# Patient Record
Sex: FEMALE | Race: WHITE | NOT HISPANIC OR LATINO | Employment: FULL TIME | URBAN - METROPOLITAN AREA
[De-identification: names, ages, dates, MRNs, and addresses within clinical notes are randomized per-mention and may not be internally consistent; named-entity substitution may affect disease eponyms.]

---

## 2018-10-26 ENCOUNTER — OFFICE VISIT CONVERTED (OUTPATIENT)
Dept: FAMILY MEDICINE CLINIC | Facility: CLINIC | Age: 23
End: 2018-10-26
Attending: NURSE PRACTITIONER

## 2019-01-09 ENCOUNTER — OFFICE VISIT CONVERTED (OUTPATIENT)
Dept: FAMILY MEDICINE CLINIC | Facility: CLINIC | Age: 24
End: 2019-01-09
Attending: NURSE PRACTITIONER

## 2019-02-04 ENCOUNTER — HOSPITAL ENCOUNTER (OUTPATIENT)
Dept: URGENT CARE | Facility: CLINIC | Age: 24
Discharge: HOME | End: 2019-02-04

## 2019-03-08 ENCOUNTER — OFFICE VISIT CONVERTED (OUTPATIENT)
Dept: FAMILY MEDICINE CLINIC | Facility: CLINIC | Age: 24
End: 2019-03-08
Attending: NURSE PRACTITIONER

## 2019-03-11 ENCOUNTER — HOSPITAL ENCOUNTER (OUTPATIENT)
Dept: FAMILY MEDICINE CLINIC | Facility: CLINIC | Age: 24
Discharge: HOME | End: 2019-03-11
Attending: NURSE PRACTITIONER

## 2019-03-11 ENCOUNTER — HOSPITAL ENCOUNTER (OUTPATIENT)
Dept: OTHER | Facility: HOSPITAL | Age: 24
Discharge: HOME | End: 2019-03-11
Attending: OBSTETRICS & GYNECOLOGY

## 2019-03-11 LAB
ALBUMIN SERPL-MCNC: 4.2 G/DL (ref 3.5–5)
ALBUMIN/GLOB SERPL: 1.3 {RATIO} (ref 1.4–2.6)
ALP SERPL-CCNC: 93 U/L (ref 42–98)
ALT SERPL-CCNC: 75 U/L (ref 10–40)
ANION GAP SERPL CALC-SCNC: 17 MMOL/L (ref 8–19)
AST SERPL-CCNC: 71 U/L (ref 15–50)
BASOPHILS # BLD AUTO: 0.05 10*3/UL (ref 0–0.2)
BASOPHILS NFR BLD AUTO: 0.8 % (ref 0–3)
BILIRUB SERPL-MCNC: 0.35 MG/DL (ref 0.2–1.3)
BUN SERPL-MCNC: 22 MG/DL (ref 5–25)
BUN/CREAT SERPL: 22 {RATIO} (ref 6–20)
CALCIUM SERPL-MCNC: 9.2 MG/DL (ref 8.7–10.4)
CHLORIDE SERPL-SCNC: 105 MMOL/L (ref 99–111)
CONV ABS IMM GRAN: 0.04 10*3/UL (ref 0–0.2)
CONV CO2: 22 MMOL/L (ref 22–32)
CONV IMMATURE GRAN: 0.6 % (ref 0–1.8)
CONV TOTAL PROTEIN: 7.5 G/DL (ref 6.3–8.2)
CREAT UR-MCNC: 0.98 MG/DL (ref 0.5–0.9)
DEPRECATED RDW RBC AUTO: 48.4 FL (ref 36.4–46.3)
EOSINOPHIL # BLD AUTO: 0.15 10*3/UL (ref 0–0.7)
EOSINOPHIL # BLD AUTO: 2.4 % (ref 0–7)
ERYTHROCYTE [DISTWIDTH] IN BLOOD BY AUTOMATED COUNT: 14.7 % (ref 11.7–14.4)
GFR SERPLBLD BASED ON 1.73 SQ M-ARVRAT: >60 ML/MIN/{1.73_M2}
GLOBULIN UR ELPH-MCNC: 3.3 G/DL (ref 2–3.5)
GLUCOSE SERPL-MCNC: 103 MG/DL (ref 65–99)
HBA1C MFR BLD: 13.9 G/DL (ref 12–16)
HCT VFR BLD AUTO: 45.1 % (ref 37–47)
LYMPHOCYTES # BLD AUTO: 1.3 10*3/UL (ref 1–5)
MCH RBC QN AUTO: 27.6 PG (ref 27–31)
MCHC RBC AUTO-ENTMCNC: 30.8 G/DL (ref 33–37)
MCV RBC AUTO: 89.5 FL (ref 81–99)
MONOCYTES # BLD AUTO: 0.67 10*3/UL (ref 0.2–1.2)
MONOCYTES NFR BLD AUTO: 10.7 % (ref 3–10)
NEUTROPHILS # BLD AUTO: 4.03 10*3/UL (ref 2–8)
NEUTROPHILS NFR BLD AUTO: 64.7 % (ref 30–85)
NRBC CBCN: 0 % (ref 0–0.7)
OSMOLALITY SERPL CALC.SUM OF ELEC: 292 MOSM/KG (ref 273–304)
PLATELET # BLD AUTO: 285 10*3/UL (ref 130–400)
PMV BLD AUTO: 10.2 FL (ref 9.4–12.3)
POTASSIUM SERPL-SCNC: 4.6 MMOL/L (ref 3.5–5.3)
RBC # BLD AUTO: 5.04 10*6/UL (ref 4.2–5.4)
SODIUM SERPL-SCNC: 139 MMOL/L (ref 135–147)
T4 FREE SERPL-MCNC: 1 NG/DL (ref 0.9–1.8)
TSH SERPL-ACNC: 1.59 M[IU]/L (ref 0.27–4.2)
VARIANT LYMPHS NFR BLD MANUAL: 20.8 % (ref 20–45)
VIT B12 SERPL-MCNC: 726 PG/ML (ref 211–911)
WBC # BLD AUTO: 6.24 10*3/UL (ref 4.8–10.8)

## 2019-03-14 LAB
CONV LAST MENSTURAL PERIOD: NORMAL
SPECIMEN SOURCE: NORMAL
SPECIMEN SOURCE: NORMAL
THIN PREP CVX: NORMAL

## 2019-06-20 ENCOUNTER — OFFICE VISIT CONVERTED (OUTPATIENT)
Dept: FAMILY MEDICINE CLINIC | Facility: CLINIC | Age: 24
End: 2019-06-20
Attending: NURSE PRACTITIONER

## 2019-06-20 ENCOUNTER — HOSPITAL ENCOUNTER (OUTPATIENT)
Dept: FAMILY MEDICINE CLINIC | Facility: CLINIC | Age: 24
Discharge: HOME | End: 2019-06-20
Attending: NURSE PRACTITIONER

## 2019-06-20 LAB
ALBUMIN SERPL-MCNC: 4 G/DL (ref 3.5–5)
ALBUMIN/GLOB SERPL: 1.3 {RATIO} (ref 1.4–2.6)
ALP SERPL-CCNC: 100 U/L (ref 42–98)
ALT SERPL-CCNC: 39 U/L (ref 10–40)
ANION GAP SERPL CALC-SCNC: 17 MMOL/L (ref 8–19)
AST SERPL-CCNC: 29 U/L (ref 15–50)
BASOPHILS # BLD AUTO: 0.05 10*3/UL (ref 0–0.2)
BASOPHILS NFR BLD AUTO: 1 % (ref 0–3)
BILIRUB SERPL-MCNC: 0.37 MG/DL (ref 0.2–1.3)
BUN SERPL-MCNC: 14 MG/DL (ref 5–25)
BUN/CREAT SERPL: 17 {RATIO} (ref 6–20)
CALCIUM SERPL-MCNC: 9.2 MG/DL (ref 8.7–10.4)
CHLORIDE SERPL-SCNC: 104 MMOL/L (ref 99–111)
CONV ABS IMM GRAN: 0.02 10*3/UL (ref 0–0.2)
CONV CO2: 23 MMOL/L (ref 22–32)
CONV IMMATURE GRAN: 0.4 % (ref 0–1.8)
CONV TOTAL PROTEIN: 7.1 G/DL (ref 6.3–8.2)
CREAT UR-MCNC: 0.82 MG/DL (ref 0.5–0.9)
DEPRECATED RDW RBC AUTO: 47.8 FL (ref 36.4–46.3)
EOSINOPHIL # BLD AUTO: 0.18 10*3/UL (ref 0–0.7)
EOSINOPHIL # BLD AUTO: 3.4 % (ref 0–7)
ERYTHROCYTE [DISTWIDTH] IN BLOOD BY AUTOMATED COUNT: 14.3 % (ref 11.7–14.4)
GFR SERPLBLD BASED ON 1.73 SQ M-ARVRAT: >60 ML/MIN/{1.73_M2}
GLOBULIN UR ELPH-MCNC: 3.1 G/DL (ref 2–3.5)
GLUCOSE SERPL-MCNC: 93 MG/DL (ref 65–99)
HBA1C MFR BLD: 13.7 G/DL (ref 12–16)
HCT VFR BLD AUTO: 45.1 % (ref 37–47)
LYMPHOCYTES # BLD AUTO: 1.61 10*3/UL (ref 1–5)
MCH RBC QN AUTO: 27.6 PG (ref 27–31)
MCHC RBC AUTO-ENTMCNC: 30.4 G/DL (ref 33–37)
MCV RBC AUTO: 90.7 FL (ref 81–99)
MONOCYTES # BLD AUTO: 0.65 10*3/UL (ref 0.2–1.2)
MONOCYTES NFR BLD AUTO: 12.4 % (ref 3–10)
NEUTROPHILS # BLD AUTO: 2.73 10*3/UL (ref 2–8)
NEUTROPHILS NFR BLD AUTO: 52.1 % (ref 30–85)
NRBC CBCN: 0 % (ref 0–0.7)
OSMOLALITY SERPL CALC.SUM OF ELEC: 290 MOSM/KG (ref 273–304)
PLATELET # BLD AUTO: 292 10*3/UL (ref 130–400)
PMV BLD AUTO: 10.7 FL (ref 9.4–12.3)
POTASSIUM SERPL-SCNC: 4.4 MMOL/L (ref 3.5–5.3)
RBC # BLD AUTO: 4.97 10*6/UL (ref 4.2–5.4)
SODIUM SERPL-SCNC: 140 MMOL/L (ref 135–147)
T4 FREE SERPL-MCNC: 1 NG/DL (ref 0.9–1.8)
TSH SERPL-ACNC: 2.27 M[IU]/L (ref 0.27–4.2)
VARIANT LYMPHS NFR BLD MANUAL: 30.7 % (ref 20–45)
WBC # BLD AUTO: 5.24 10*3/UL (ref 4.8–10.8)

## 2019-06-22 LAB — BACTERIA UR CULT: NORMAL

## 2019-09-06 ENCOUNTER — OFFICE VISIT CONVERTED (OUTPATIENT)
Dept: FAMILY MEDICINE CLINIC | Facility: CLINIC | Age: 24
End: 2019-09-06
Attending: NURSE PRACTITIONER

## 2019-09-06 ENCOUNTER — CONVERSION ENCOUNTER (OUTPATIENT)
Dept: FAMILY MEDICINE CLINIC | Facility: CLINIC | Age: 24
End: 2019-09-06

## 2020-01-20 ENCOUNTER — HOSPITAL ENCOUNTER (OUTPATIENT)
Dept: URGENT CARE | Facility: CLINIC | Age: 25
Discharge: HOME | End: 2020-01-20
Attending: FAMILY MEDICINE

## 2020-01-22 LAB — BACTERIA SPEC AEROBE CULT: NORMAL

## 2020-02-26 ENCOUNTER — HOSPITAL ENCOUNTER (OUTPATIENT)
Dept: URGENT CARE | Facility: CLINIC | Age: 25
Discharge: HOME | End: 2020-02-26

## 2020-02-28 LAB — BACTERIA SPEC AEROBE CULT: NORMAL

## 2020-11-09 ENCOUNTER — OFFICE VISIT CONVERTED (OUTPATIENT)
Dept: FAMILY MEDICINE CLINIC | Facility: CLINIC | Age: 25
End: 2020-11-09
Attending: NURSE PRACTITIONER

## 2020-11-09 ENCOUNTER — HOSPITAL ENCOUNTER (OUTPATIENT)
Dept: FAMILY MEDICINE CLINIC | Facility: CLINIC | Age: 25
Discharge: HOME | End: 2020-11-09
Attending: NURSE PRACTITIONER

## 2020-11-09 LAB
ALBUMIN SERPL-MCNC: 4.2 G/DL (ref 3.5–5)
ALBUMIN/GLOB SERPL: 1.4 {RATIO} (ref 1.4–2.6)
ALP SERPL-CCNC: 128 U/L (ref 42–98)
ALT SERPL-CCNC: 157 U/L (ref 10–40)
ANION GAP SERPL CALC-SCNC: 16 MMOL/L (ref 8–19)
AST SERPL-CCNC: 88 U/L (ref 15–50)
BASOPHILS # BLD AUTO: 0.08 10*3/UL (ref 0–0.2)
BASOPHILS NFR BLD AUTO: 1.1 % (ref 0–3)
BILIRUB SERPL-MCNC: <0.15 MG/DL (ref 0.2–1.3)
BUN SERPL-MCNC: 17 MG/DL (ref 5–25)
BUN/CREAT SERPL: 20 {RATIO} (ref 6–20)
CALCIUM SERPL-MCNC: 9.3 MG/DL (ref 8.7–10.4)
CHLORIDE SERPL-SCNC: 101 MMOL/L (ref 99–111)
CHOLEST SERPL-MCNC: 189 MG/DL (ref 107–200)
CHOLEST/HDLC SERPL: 3.7 {RATIO} (ref 3–6)
CONV ABS IMM GRAN: 0.06 10*3/UL (ref 0–0.2)
CONV CO2: 23 MMOL/L (ref 22–32)
CONV IMMATURE GRAN: 0.9 % (ref 0–1.8)
CONV TOTAL PROTEIN: 7.1 G/DL (ref 6.3–8.2)
CREAT UR-MCNC: 0.83 MG/DL (ref 0.5–0.9)
DEPRECATED RDW RBC AUTO: 47.1 FL (ref 36.4–46.3)
EOSINOPHIL # BLD AUTO: 0.24 10*3/UL (ref 0–0.7)
EOSINOPHIL # BLD AUTO: 3.4 % (ref 0–7)
ERYTHROCYTE [DISTWIDTH] IN BLOOD BY AUTOMATED COUNT: 13.8 % (ref 11.7–14.4)
FOLATE SERPL-MCNC: 8.4 NG/ML (ref 4.8–20)
GFR SERPLBLD BASED ON 1.73 SQ M-ARVRAT: >60 ML/MIN/{1.73_M2}
GLOBULIN UR ELPH-MCNC: 2.9 G/DL (ref 2–3.5)
GLUCOSE SERPL-MCNC: 162 MG/DL (ref 65–99)
HCT VFR BLD AUTO: 44.9 % (ref 37–47)
HDLC SERPL-MCNC: 51 MG/DL (ref 40–60)
HGB BLD-MCNC: 14 G/DL (ref 12–16)
LDLC SERPL CALC-MCNC: 100 MG/DL (ref 70–100)
LYMPHOCYTES # BLD AUTO: 1.95 10*3/UL (ref 1–5)
LYMPHOCYTES NFR BLD AUTO: 28 % (ref 20–45)
MCH RBC QN AUTO: 28.7 PG (ref 27–31)
MCHC RBC AUTO-ENTMCNC: 31.2 G/DL (ref 33–37)
MCV RBC AUTO: 92 FL (ref 81–99)
MONOCYTES # BLD AUTO: 0.82 10*3/UL (ref 0.2–1.2)
MONOCYTES NFR BLD AUTO: 11.8 % (ref 3–10)
NEUTROPHILS # BLD AUTO: 3.82 10*3/UL (ref 2–8)
NEUTROPHILS NFR BLD AUTO: 54.8 % (ref 30–85)
NRBC CBCN: 0 % (ref 0–0.7)
OSMOLALITY SERPL CALC.SUM OF ELEC: 285 MOSM/KG (ref 273–304)
PLATELET # BLD AUTO: 271 10*3/UL (ref 130–400)
PMV BLD AUTO: 10.4 FL (ref 9.4–12.3)
POTASSIUM SERPL-SCNC: 4.6 MMOL/L (ref 3.5–5.3)
RBC # BLD AUTO: 4.88 10*6/UL (ref 4.2–5.4)
SODIUM SERPL-SCNC: 135 MMOL/L (ref 135–147)
TRIGL SERPL-MCNC: 192 MG/DL (ref 40–150)
TSH SERPL-ACNC: 1.91 M[IU]/L (ref 0.27–4.2)
VIT B12 SERPL-MCNC: 874 PG/ML (ref 211–911)
VLDLC SERPL-MCNC: 38 MG/DL (ref 5–37)
WBC # BLD AUTO: 6.97 10*3/UL (ref 4.8–10.8)

## 2020-11-10 LAB
EST. AVERAGE GLUCOSE BLD GHB EST-MCNC: 126 MG/DL
HBA1C MFR BLD: 6 % (ref 3.5–5.7)

## 2020-12-07 ENCOUNTER — TELEPHONE CONVERTED (OUTPATIENT)
Dept: FAMILY MEDICINE CLINIC | Facility: CLINIC | Age: 25
End: 2020-12-07
Attending: NURSE PRACTITIONER

## 2021-01-05 ENCOUNTER — TELEMEDICINE CONVERTED (OUTPATIENT)
Dept: FAMILY MEDICINE CLINIC | Facility: CLINIC | Age: 26
End: 2021-01-05
Attending: NURSE PRACTITIONER

## 2021-02-05 ENCOUNTER — OFFICE VISIT CONVERTED (OUTPATIENT)
Dept: FAMILY MEDICINE CLINIC | Facility: CLINIC | Age: 26
End: 2021-02-05
Attending: NURSE PRACTITIONER

## 2021-02-05 ENCOUNTER — HOSPITAL ENCOUNTER (OUTPATIENT)
Dept: OTHER | Facility: HOSPITAL | Age: 26
Discharge: HOME | End: 2021-02-05
Attending: NURSE PRACTITIONER

## 2021-02-05 LAB
ALBUMIN SERPL-MCNC: 4.1 G/DL (ref 3.5–5)
ALBUMIN/GLOB SERPL: 1.2 {RATIO} (ref 1.4–2.6)
ALP SERPL-CCNC: 118 U/L (ref 42–98)
ALT SERPL-CCNC: 25 U/L (ref 10–40)
ANION GAP SERPL CALC-SCNC: 14 MMOL/L (ref 8–19)
AST SERPL-CCNC: 18 U/L (ref 15–50)
BILIRUB SERPL-MCNC: <0.15 MG/DL (ref 0.2–1.3)
BUN SERPL-MCNC: 21 MG/DL (ref 5–25)
BUN/CREAT SERPL: 29 {RATIO} (ref 6–20)
CALCIUM SERPL-MCNC: 9.2 MG/DL (ref 8.7–10.4)
CHLORIDE SERPL-SCNC: 101 MMOL/L (ref 99–111)
CONV CO2: 26 MMOL/L (ref 22–32)
CONV TOTAL PROTEIN: 7.5 G/DL (ref 6.3–8.2)
CREAT UR-MCNC: 0.72 MG/DL (ref 0.5–0.9)
EST. AVERAGE GLUCOSE BLD GHB EST-MCNC: 114 MG/DL
GFR SERPLBLD BASED ON 1.73 SQ M-ARVRAT: >60 ML/MIN/{1.73_M2}
GLOBULIN UR ELPH-MCNC: 3.4 G/DL (ref 2–3.5)
GLUCOSE SERPL-MCNC: 95 MG/DL (ref 65–99)
HBA1C MFR BLD: 5.6 % (ref 3.5–5.7)
OSMOLALITY SERPL CALC.SUM OF ELEC: 287 MOSM/KG (ref 273–304)
POTASSIUM SERPL-SCNC: 4.4 MMOL/L (ref 3.5–5.3)
SODIUM SERPL-SCNC: 137 MMOL/L (ref 135–147)

## 2021-03-16 ENCOUNTER — TELEMEDICINE CONVERTED (OUTPATIENT)
Dept: FAMILY MEDICINE CLINIC | Facility: CLINIC | Age: 26
End: 2021-03-16
Attending: NURSE PRACTITIONER

## 2021-03-29 ENCOUNTER — HOSPITAL ENCOUNTER (OUTPATIENT)
Dept: OTHER | Facility: HOSPITAL | Age: 26
Discharge: HOME | End: 2021-03-29
Attending: OBSTETRICS & GYNECOLOGY

## 2021-03-29 LAB — HCG INTACT+B SERPL-ACNC: <0.5 M[IU]/ML (ref 0–5)

## 2021-05-06 ENCOUNTER — TELEMEDICINE CONVERTED (OUTPATIENT)
Dept: FAMILY MEDICINE CLINIC | Facility: CLINIC | Age: 26
End: 2021-05-06
Attending: NURSE PRACTITIONER

## 2021-05-13 NOTE — PROGRESS NOTES
Progress Note      Patient Name: Zhane Traylor   Patient ID: [id number]   Sex: Female   YOB: 1995    Primary Care Provider: Avelina GALLO    Visit Date: November 9, 2020    Provider: CLEO Bright   Location: [facility]   Location Address: [address]  [place]  [id number]   Location Phone: [phone]          Chief Complaint  · med refills      History Of Present Illness  Zhane Traylor is a 25 year old /White female who presents for evaluation and treatment of:      anxiety, she is having trouble again. She ran out of her zoloft, it was working well. She is interested in taking it with buspar because she has family members who are taking the combo. She has started drinking again but it's been 2 weeks since she had anything to drink.      overweight- she wants to know what she can take for an appetite suppresant. She says the anxiety has been somethign that has contributed to her weight loss.              Past Medical History  Disease Name Date Onset Notes   Allergic rhinitis --  --    Anxiety --  --    Asthma --  --    Broken Bones --  --    Bronchitis --  --    Depression --  --    High blood pressure --  --    Psychiatric Care --  --    Shortness of Breath --  --    Sinus trouble --  --          Past Surgical History  Procedure Name Date Notes   [place] Tooth Extraction 2012 --          Medication List  Name Date Started Instructions   Singulair 10 mg oral tablet 09/06/2019 take 1 tablet (10 mg) by oral route once daily in the evening   Zoloft 100 mg oral tablet 09/06/2019 take 1 tablet (100 mg) by oral route once daily   Zyrtec 10 mg oral tablet  take 1 tablet (10 mg) by oral route once daily         Allergy List  Allergen Name Date Reaction Notes   NO KNOWN DRUG ALLERGIES --  --  --        Allergies Reconciled  Family Medical History  Disease Name Relative/Age Notes   Heart Disease  --    Diabetes  --          Social  "History  Finding Status Start/Stop Quantity Notes   Tobacco Former --/-- --  --          Immunizations  NameDate Admin Mfg Trade Name Lot Number Route Inj VIS Given VIS Publication   [id number]/26/2018 SKB Fluarix, quadrivalent, preservative free [id number] IM RD 10/26/2018 08/07/2015   Comments: Pt tolerated well, left office in stable condition         Review of Systems  · Constitutional  o Admits  o : fatigue, weight gain  o Denies  o : fever, weight loss  · Cardiovascular  o Denies  o : palpations, chest pain, hypertension  · Respiratory  o Denies  o : shortness of breath, dry cough, productive cough  · Psychiatric  o Admits  o : anxiety, depression  o Denies  o : suicidal ideation, homicidal ideation      Vitals  Date Time BP Position Site L\R Cuff Size HR RR TEMP (F) WT  HT  BMI kg/m2 BSA m2 O2 Sat FR L/min FiO2 HC       11/09/2020 02:59 /75 Sitting    93 - R 18 98.4 258lbs 5oz 5'  6.5\" 41.07 2.34 99 %            Physical Examination  · Constitutional  o Appearance  o : well developed, well-nourished, no acute distress  · Head and Face  o Head  o :   § Inspection  § : atraumatic, normocephalic  · Neck  o Thyroid  o : gland size normal, nontender, no nodules or masses present on palpation  · Respiratory  o Respiratory Effort  o : breathing unlabored  o Inspection of Chest  o : chest rise symmetric bilaterally  o Auscultation of Lungs  o : clear to auscultation bilaterally throughout inspiration and expiration  · Cardiovascular  o Heart  o :   § Auscultation of Heart  § : regular rate and rhythm, no murmurs, gallops or rubs  o Peripheral Vascular System  o :   § Extremities  § : no edema  · Lymphatic  o Neck  o : no cervical lymphadenopathy, no supraclavicular lymphadenopathy  · Psychiatric  o Mood and Affect  o : mood normal, affect appropriate  o Presence of Abnormal Thoughts  o : no hallucinations, no homicidal ideation, no suicidal ideation              Assessment  · Alcohol " "abuse     305.00/F10.10  · Allergic rhinitis due to allergen     477.9/J30.9  · Anxiety disorder     300.00/F41.9  · Major depressive disorder     296.20/F32.2  · Obesity     278.00/E66.9  · Screening for depression     V79.0/Z13.89  · B12 deficiency     266.2/E53.8  · Overweight     278.02/E66.3  · Elevated LFTs     790.6/R94.5      Plan  · Orders  o Brief face-to-face behavioral counseling for alcohol misuse, 15 minutes () - 305.00/F10.10 - 11/09/2020  o Annual depression screening, 15 minutes (, 13111) - V79.0/Z13.89 - 11/09/2020  o ACO-18: Positive screen for clinical depression using a standardized tool and a follow-up plan documented () - V79.0/Z13.89 - 11/09/2020  o Psychiatric Consultation (PSYCH) - 300.00/F41.9, 296.20/F32.2 - 11/09/2020   pt prefers angelica leonard  · Medications  o buspirone 5 mg oral tablet   SIG: take 1 tablet (5 mg) by oral route 2 times per day   DISP: (60) Tablet with 2 refills  Prescribed on 11/09/2020     o Singulair 10 mg oral tablet   SIG: take 1 tablet (10 mg) by oral route once daily in the evening   DISP: (30) Tablet with 5 refills  Refilled on 11/09/2020     o Zoloft 100 mg oral tablet   SIG: take 1 tablet (100 mg) by oral route once daily   DISP: (30) Tablet with 5 refills  Refilled on 11/09/2020     o Medications have been Reconciled  o Transition of Care or Provider Policy  · Instructions  o Counseled patient on harms of alcohol misuse and encouraged cessation of alcohol intake (15 minutes).  o A list of local qualified behavioral health care centers, psychologists, and psychiatrists was given to the patient at this visit today.   o Local AA (Alcoholics Anonymous) information provided to patient/family.   o Patient agrees to a \"No Self Harm\" contract. Patient will either call , [facility], , [facility], [facility].  o The patient and I discussed the need for therapy, either with a certified counselor, psychologist, and/or family " . If no improvement is noted or worsening of their condition, return to office or ER. But also discussed with patient that if they are non-responsive to the type of medication they may need to see a psychaitrist for further evaluation and management.   o Patient was given an SSRI/SSNRI medication and warned of possible side effects of the medication including potential for increase risk of sucicidal thoughts and feelings. Patient was instructed that if they begin to exhibit any of these effects they will discontinue the medication immediately and contact our office or the ER ASAP.   o Depression Screen completed and scanned into the EMR under the designated folder within the patient's documents.  o PHQ-9 results between 10-14 indicate Mild/Minor Depression  o The provider screening met the required time of 15 minutes.  o she is going to take half the zoloft for the first few weeks and start low on the buspar and go up as tolerated till she gets used to it, f/u in months, sooner if needed.   o try weight watchers and find a gym partner. Download the Backpack pal guerita for accountability.   · Disposition  o Call or Return if symptoms worsen or persist.  o F/U appt in 1 month            Electronically Signed by: CLEO Bright -Author on November 9, 2020 04:46:36 PM

## 2021-05-13 NOTE — PROGRESS NOTES
Progress Note      Patient Name: Zhane Traylor   Patient ID: [id number]   Sex: Female   YOB: 1995    Primary Care Provider: Avelina GALLO    Visit Date: December 7, 2020    Provider: CLEO Bright   Location: [facility]   Location Address: [address]  [place]  [id number]   Location Phone: [phone]          Chief Complaint  · follow up      History Of Present Illness  Zhane Traylor is a 25 year old /White female who presents for evaluation and treatment of:   TELEHEALTH TELEPHONE VISIT  Zhane Traylor is a 25 year old /White female who is presenting for evaluation via telehealth telephone visit. Verbal consent obtained before beginning visit.   Provider spent 15 minutes with patient during telehealth visit.   The following staff were present during this visit: INPUT BOX   Past Medical History/Overview of Patient Symptoms     anxiety, she just increased her dose of the zoloft and buspar, she felt funny yesterday but is better today. She denies SI/HI.     overweight- she is going to try to diet.. Her AIC was 6.0. She is going to start weight watchers in January with a cousin.     Family hx of type 2 dm.     she has not had any alcohol since our last visit. Her LFTs were elevated.              Past Medical History  Disease Name Date Onset Notes   Allergic rhinitis --  --    Anxiety --  --    Asthma --  --    Broken Bones --  --    Bronchitis --  --    Depression --  --    High blood pressure --  --    Psychiatric Care --  --    Shortness of Breath --  --    Sinus trouble --  --          Past Surgical History  Procedure Name Date Notes   [place] Tooth Extraction 2012 --          Medication List  Name Date Started Instructions   buspirone 5 mg oral tablet 11/09/2020 take 1 tablet (5 mg) by oral route 2 times per day   Singulair 10 mg oral tablet 11/09/2020 take 1 tablet (10 mg) by oral route once daily in the evening  "  Zoloft 100 mg oral tablet 11/09/2020 take 1 tablet (100 mg) by oral route once daily   Zyrtec 10 mg oral tablet  take 1 tablet (10 mg) by oral route once daily         Allergy List  Allergen Name Date Reaction Notes   NO KNOWN DRUG ALLERGIES --  --  --        Allergies Reconciled  Family Medical History  Disease Name Relative/Age Notes   Heart Disease  --    Diabetes  --          Social History  Finding Status Start/Stop Quantity Notes   Tobacco Former --/-- --  --          Immunizations  NameDate Admin Mfg Trade Name Lot Number Route Inj VIS Given VIS Publication   [id number]/26/2018 SKB Fluarix, quadrivalent, preservative free [id number] IM RD 10/26/2018 08/07/2015   Comments: Pt tolerated well, left office in stable condition         Review of Systems  · Constitutional  o Admits  o : fatigue, weight gain  o Denies  o : fever, weight loss  · Cardiovascular  o Denies  o : palpations, chest pain, hypertension  · Respiratory  o Denies  o : shortness of breath, dry cough, productive cough  · Psychiatric  o Admits  o : anxiety, depression  o Denies  o : suicidal ideation, homicidal ideation              Assessment  · Anxiety disorder     300.00/F41.9  · Major depressive disorder     296.20/F32.2  · Overweight     278.02/E66.3  · Elevated LFTs     790.6/R94.5  · Borderline diabetes     790.29/R73.03      Plan  · Orders  o ACO-39: Current medications updated and reviewed (1159F, ) - - 12/07/2020  o Physician Telephone Evaluation, 11-20 minutes (08705) - - 12/07/2020  · Medications  o Medications have been Reconciled  o Transition of Care or Provider Policy  · Instructions  o Patient agrees to a \"No Self Harm\" contract. Patient will either call us, 1, ER, [facility], [facility].  o The patient and I discussed the need for therapy, either with a certified counselor, psychologist, and/or family . If no improvement is noted or worsening of their condition, return to office or ER. " But also discussed with patient that if they are non-responsive to the type of medication they may need to see a psychaitrist for further evaluation and management.   o Patient was given an SSRI/SSNRI medication and warned of possible side effects of the medication including potential for increase risk of sucicidal thoughts and feelings. Patient was instructed that if they begin to exhibit any of these effects they will discontinue the medication immediately and contact our office or the ER ASAP.   o Plan Of Care: she has plenty meds, she is going to continue current meds and f/u in 3 months unless she needs something sooner. We will repeat the cmp and hgaic at 3 month f/u. Work on weight loss. Praised of efforts of alcohol cessation  o Chronic conditions reviewed and taken into consideration for today's treatment plan.  o Patient instructed to seek medical attention urgently for new or worsening symptoms.  o Patient was educated/instructed on their diagnosis, treatment and medications prior to discharge from the clinic today.  o Take all medications as prescribed/directed.  o Call the office with any concerns or questions.  · Disposition  o Call or Return if symptoms worsen or persist.  o F/u appt in 3 months            Electronically Signed by: CLEO Bright -Author on December 7, 2020 04:44:22 PM

## 2021-05-14 VITALS
DIASTOLIC BLOOD PRESSURE: 76 MMHG | HEART RATE: 90 BPM | BODY MASS INDEX: 40.08 KG/M2 | SYSTOLIC BLOOD PRESSURE: 115 MMHG | TEMPERATURE: 98.4 F | RESPIRATION RATE: 18 BRPM | WEIGHT: 249.37 LBS | HEIGHT: 66 IN | OXYGEN SATURATION: 97 %

## 2021-05-14 VITALS
TEMPERATURE: 98.4 F | OXYGEN SATURATION: 99 % | HEART RATE: 93 BPM | WEIGHT: 258.31 LBS | DIASTOLIC BLOOD PRESSURE: 75 MMHG | HEIGHT: 66 IN | RESPIRATION RATE: 18 BRPM | BODY MASS INDEX: 41.51 KG/M2 | SYSTOLIC BLOOD PRESSURE: 124 MMHG

## 2021-05-14 NOTE — PROGRESS NOTES
Progress Note      Patient Name: Zhane Traylor   Patient ID: [id number]   Sex: Female   YOB: 1995    Primary Care Provider: Avelina GALLO    Visit Date: January 5, 2021    Provider: CLEO Bright   Location: [facility]   Location Address: [address]  [place]  [id number]   Location Phone: [phone]          Chief Complaint  · discuss increase in med      History Of Present Illness  TELEHEALTH TELEPHONE VISIT  Zhane Traylor is a 25 year old /White female who is presenting for evaluation via telehealth telephone visit. Verbal consent obtained before beginning visit.   Provider spent 15 minutes with patient during telehealth visit.   The following staff were present during this visit: gm   Past Medical History/Overview of Patient Symptoms  Zhane Traylor is a 25 year old /White female who presents for evaluation and treatment of:      anxiety. Has gotten worse. her aunt is a psych NP and told her she could double up on the buspar, she has been doing that and it has helped.    SHe was arrested for a DUI with her daughter in the car in December. She went to half-way and has 2 court dates coming up. She has lost custody temporarily, her daughter is living wiht her grandmother currently. She has not had anything to drink since 12-12-20. She is seeing a court appointed mental health specialist and is going to start [facility]. She is attending parenting classes. She has 2 court dates this month. She says she is upset but this might be the wake up call that she needed.       Past Medical History  Disease Name Date Onset Notes   Allergic rhinitis --  --    Anxiety --  --    Asthma --  --    Broken Bones --  --    Bronchitis --  --    Depression --  --    High blood pressure --  --    Psychiatric Care --  --    Shortness of Breath --  --    Sinus trouble --  --          Past Surgical History  Procedure Name Date Notes   [place] Tooth  Extraction 2012 --          Medication List  Name Date Started Instructions   buspirone 5 mg oral tablet 11/09/2020 take 1 tablet (5 mg) by oral route 2 times per day   Singulair 10 mg oral tablet 11/09/2020 take 1 tablet (10 mg) by oral route once daily in the evening   Zoloft 100 mg oral tablet 11/09/2020 take 1 tablet (100 mg) by oral route once daily   Zyrtec 10 mg oral tablet  take 1 tablet (10 mg) by oral route once daily         Allergy List  Allergen Name Date Reaction Notes   NO KNOWN DRUG ALLERGIES --  --  --        Allergies Reconciled  Family Medical History  Disease Name Relative/Age Notes   Heart Disease  --    Diabetes  --          Social History  Finding Status Start/Stop Quantity Notes   Tobacco Former --/-- --  --          Immunizations  NameDate Admin Mfg Trade Name Lot Number Route Inj VIS Given VIS Publication   [id number]/26/2018 SKB Fluarix, quadrivalent, preservative free [id number] IM RD 10/26/2018 08/07/2015   Comments: Pt tolerated well, left office in stable condition         Review of Systems  · Constitutional  o Denies  o : fever, fatigue, weight loss, weight gain  · Cardiovascular  o Denies  o : lower extremity edema, claudication, chest pressure, palpitations  · Respiratory  o Denies  o : shortness of breath, wheezing, cough, hemoptysis, dyspnea on exertion  · Gastrointestinal  o Denies  o : nausea, vomiting, diarrhea, constipation, abdominal pain  · Psychiatric  o Admits  o : anxiety, depression  o Denies  o : SI/HI          Assessment  · Alcohol abuse     305.00/F10.10  · Anxiety disorder     300.00/F41.9      Plan  · Orders  o ACO-39: Current medications updated and reviewed (1159F, ) - - 01/05/2021  · Medications  o buspirone 10 mg oral tablet   SIG: take 1 tablet (10 mg) by oral route 2 times per day   DISP: (60) Tablet with 2 refills  Adjusted on 01/05/2021     o Medications have been Reconciled  o Transition of Care or Provider Policy  · Instructions  o Counseled patient on  [General Appearance - Well Developed] : well developed [Normal Appearance] : normal appearance harms of alcohol misuse and encouraged cessation of alcohol intake (15 minutes).  o Plan Of Care: ok to increase the buspar with a f/u in a month, sooner if needed, continue care with [employer] [facility].   o Chronic conditions reviewed and taken into consideration for today's treatment plan.  o Patient instructed to seek medical attention urgently for new or worsening symptoms.  o Call the office with any concerns or questions.  · Disposition  o Call or Return if symptoms worsen or persist.            Electronically Signed by: CLEO Bright -Author on January 5, 2021 03:25:20 PM   [Well Groomed] : well groomed [General Appearance - Well Nourished] : well nourished [No Deformities] : no deformities [General Appearance - In No Acute Distress] : no acute distress [Normal Conjunctiva] : the conjunctiva exhibited no abnormalities [Eyelids - No Xanthelasma] : the eyelids demonstrated no xanthelasmas [Normal Oral Mucosa] : normal oral mucosa [No Oral Pallor] : no oral pallor [No Oral Cyanosis] : no oral cyanosis [Normal Jugular Venous A Waves Present] : normal jugular venous A waves present [Normal Jugular Venous V Waves Present] : normal jugular venous V waves present [No Jugular Venous Mendoza A Waves] : no jugular venous mendoza A waves [Heart Sounds] : normal S1 and S2 [Heart Rate And Rhythm] : heart rate and rhythm were normal [Murmurs] : no murmurs present [Respiration, Rhythm And Depth] : normal respiratory rhythm and effort [Exaggerated Use Of Accessory Muscles For Inspiration] : no accessory muscle use [Auscultation Breath Sounds / Voice Sounds] : lungs were clear to auscultation bilaterally [Abdomen Soft] : soft [Abdomen Tenderness] : non-tender [Abdomen Mass (___ Cm)] : no abdominal mass palpated [Abnormal Walk] : normal gait [Gait - Sufficient For Exercise Testing] : the gait was sufficient for exercise testing [Nail Clubbing] : no clubbing of the fingernails [Cyanosis, Localized] : no localized cyanosis [Petechial Hemorrhages (___cm)] : no petechial hemorrhages [Skin Color & Pigmentation] : normal skin color and pigmentation [] : no rash [No Venous Stasis] : no venous stasis [Skin Lesions] : no skin lesions [No Skin Ulcers] : no skin ulcer [No Xanthoma] : no  xanthoma was observed [Oriented To Time, Place, And Person] : oriented to person, place, and time [Affect] : the affect was normal [Mood] : the mood was normal [No Anxiety] : not feeling anxious

## 2021-05-14 NOTE — PROGRESS NOTES
Progress Note      Patient Name: Zhane Traylor   Patient ID: [id number]   Sex: Female   YOB: 1995    Primary Care Provider: Aveilna GALLO    Visit Date: March 16, 2021    Provider: CLEO Bright   Location: [facility]   Location Address: [address]  [place]  [id number]   Location Phone: [phone]          Chief Complaint  · 1mth follow up      History Of Present Illness  Past Medical History/Overview of Patient Symptoms  Zhane Traylor is a 25 year old /White female who presents for evaluation and treatment of:      f/u on anxiety/depression, she has been on prozac for a month, she says it makes her mad, like she wants to punch someone in the face. She denies Si/HI. SHe would like to try something else.  We reviewed her genesight results today.     alcohol abuse, in remission. She was arrested for a DUI with her daughter in the car in December. She went to [facility]. She lost custody for a time, but is with her daughter today.        Video Conferencing Visit  Zhane Traylor is a 25 year old /White female who is presenting for evaluation via video conferencing via [employer]. Verbal consent obtained before beginning visit.   The following staff were present during this visit: gm       Past Medical History  Disease Name Date Onset Notes   Alcohol abuse 01/05/2021 --    Alcoholism in remission 02/05/2021 --    Allergic rhinitis --  --    Anxiety 02/05/2021 --    Anxiety disorder 01/05/2021 --    Asthma --  --    Borderline diabetes 02/05/2021 --    Broken Bones --  --    Bronchitis --  --    Depression --  --    High blood pressure --  --    Major depression 02/05/2021 --    Obesity 02/05/2021 --    Psychiatric Care --  --    Shortness of Breath --  --    Sinus trouble --  --          Past Surgical History  Procedure Name Date Notes   Dilatation and curettage --  --    [place] Tooth Extraction 2012 --          Medication  List  Name Date Started Instructions   buspirone 10 mg oral tablet 02/05/2021 take 1 tablet (10 mg) by oral route 3 times per day for 30 days   Mucinex DM  mg oral tablet extended release 12 hr 02/05/2021 take 1 tablet by oral route every 12 hours as needed   Prozac 20 mg oral capsule 02/18/2021 take 1 capsule (20 mg) by oral route once daily in the evening for 30 days   Singulair 10 mg oral tablet 11/09/2020 take 1 tablet (10 mg) by oral route once daily in the evening   Zyrtec 10 mg oral tablet  take 1 tablet (10 mg) by oral route once daily         Allergy List  Allergen Name Date Reaction Notes   NO KNOWN DRUG ALLERGIES --  --  --        Allergies Reconciled  Family Medical History  Disease Name Relative/Age Notes   Heart Disease  --    Diabetes  --          Social History  Finding Status Start/Stop Quantity Notes   Tobacco Former --/-- --  --          Immunizations  NameDate Admin Mfg Trade Name Lot Number Route Inj VIS Given VIS Publication   [id number]/26/2018 SKB Fluarix, quadrivalent, preservative free [id number] IM RD 10/26/2018 08/07/2015   Comments: Pt tolerated well, left office in stable condition         Review of Systems  · Constitutional  o Denies  o : fever fatigue weight gain  · Cardiovascular  o Admits  o : chest pain, palpitatins, swelling  · Respiratory  o Admits  o : cough, shortness of breath, wheezing  · Genitourinary  o Denies  o : incontinence  · Psychiatric  o Admits  o : anxiety, depression, irritability  o Denies  o : SI/HI      Physical Examination  · Constitutional  o Appearance  o : well-nourished, well developed, alert, in no acute distress  · Respiratory  o Respiratory Effort  o : breathing unlabored  · Psychiatric  o Mood and Affect  o : mood normal, affect appropriate, denies any SI/HI     gross neuro intact               Assessment  · Alcoholism in remission     303.93/F10.21  · Anxiety     300.00/F41.9  · Major depression     296.20/F32.9      Plan  · Orders  o ACO-39:  Current medications updated and reviewed (1159F, , 1159F) - - 03/16/2021  · Medications  o Effexor XR 37.5 mg oral capsule,extended release 24hr   SIG: take 1 capsule (37.5 mg) by oral route once daily with food   DISP: (30) Capsule with 1 refills  Prescribed on 03/16/2021     o Prozac 20 mg oral capsule   SIG: take 1 capsule (20 mg) by oral route once daily in the evening for 30 days   DISP: (30) Capsule with 2 refills  Discontinued on 03/16/2021     o Medications have been Reconciled  o Transition of Care or Provider Policy  · Instructions  o stop the prozac and change to effexor, continue the buspar, f/u with counseling  o f/u wht me in 4-6 weeks, sooner if needed.   · Disposition  o Call or Return if symptoms worsen or persist.  o F/U appt in 1 month            Electronically Signed by: CLEO Bright -Author on March 16, 2021 03:03:46 PM

## 2021-05-14 NOTE — PROGRESS NOTES
"   Progress Note      Patient Name: Zhane Traylor   Patient ID: [id number]   Sex: Female   YOB: 1995    Primary Care Provider: Avelina GALLO    Visit Date: February 5, 2021    Provider: CLEO Bright   Location: [facility]   Location Address: [address]  [place], [place]  [id number]   Location Phone: [phone]          Chief Complaint  · follow up      History Of Present Illness  TELEHEALTH TELEPHONE VISIT  Zhane Traylor is a 25 year old /White female who is presenting for evaluation via telehealth telephone visit. Verbal consent obtained before beginning visit.   Provider spent 15 minutes with patient during telehealth visit.   The following staff were present during this visit: gm   Past Medical History/Overview of Patient Symptoms  Zhane Traylor is a 25 year old /White female who presents for evaluation and treatment of:      breast pain, bilateral, not bothering her today. She is not sure how to do a breast exam. She worries she has cancer. She has family history of her dad's side. She thinks she might need a mammogram.     hyperglycemia at last lab draw in November. She was drinking at that time, and she thinks that might have affected her blood sugar. She wants to work on her weight too in the future.     alcohol abuse, in remission. She was arrested for a DUI with her daughter in the car in December. She went to care home and has a trial today. She has lost custody temporarily, her daughter is living wiht her mother currently and Zhane had to move out of her mom's house. She has not had anything to drink since 12-11-20. She is seeing a court appointed mental health specialist and is going to start [facility]. She is attending parenting classes. She has an appt with psychiatry Dell Sanabria on Feb 22.    anxiety and depression, she thinks she has bipolar disorder \"but not bad.\" She can see a difference when she takes buspar. The " zoloft she can't see any improvement with. She wants to increase the buspar to help with the worry that she deals with. She is in college.  Denies SI/HI. She is under a lot of stress.     elevated LFTs, will recheck today.     she has a URI currently, was seen at the [facility] and tested neg for covid. She is on prednison. She is taking mucinex otc but would like a rx for it since she has to drug test weekly.       Past Medical History  Disease Name Date Onset Notes   Alcohol abuse 01/05/2021 --    Allergic rhinitis --  --    Anxiety disorder 01/05/2021 --    Asthma --  --    Broken Bones --  --    Bronchitis --  --    Depression --  --    High blood pressure --  --    Psychiatric Care --  --    Shortness of Breath --  --    Sinus trouble --  --          Past Surgical History  Procedure Name Date Notes   Dilatation and curettage --  --    [place] Tooth Extraction 2012 --          Medication List  Name Date Started Instructions   buspirone 10 mg oral tablet 01/18/2021 take 1 tablet (10 mg) by oral route 2 times per day   Singulair 10 mg oral tablet 11/09/2020 take 1 tablet (10 mg) by oral route once daily in the evening   Zoloft 100 mg oral tablet 11/09/2020 take 1 tablet (100 mg) by oral route once daily   Zyrtec 10 mg oral tablet  take 1 tablet (10 mg) by oral route once daily         Allergy List  Allergen Name Date Reaction Notes   NO KNOWN DRUG ALLERGIES --  --  --        Allergies Reconciled  Family Medical History  Disease Name Relative/Age Notes   Heart Disease  --    Diabetes  --          Social History  Finding Status Start/Stop Quantity Notes   Tobacco Former --/-- --  --          Immunizations  NameDate Admin Mfg Trade Name Lot Number Route Inj VIS Given VIS Publication   [id number]/26/2018 SKB Fluarix, quadrivalent, preservative free [id number] IM RD 10/26/2018 08/07/2015   Comments: Pt tolerated well, left office in stable condition         Review of Systems  · Constitutional  o Admits  o : weight  "gain  o Denies  o : chills, excessive sweating, fatigue, fever, sycope/passing out, weight loss  · Respiratory  o Denies  o : shortness of breath, dry cough, productive cough, pneumonia, COPD      Vitals  Date Time BP Position Site L\R Cuff Size HR RR TEMP (F) WT  HT  BMI kg/m2 BSA m2 O2 Sat FR L/min FiO2 HC       02/05/2021 08:30 /76 Sitting    90 - R 18 98.4 249lbs 6oz 5'  6.5\" 39.65 2.3 97 %            Physical Examination  · Constitutional  o Appearance  o : well-nourished, well developed, alert, in no acute distress  · Neck  o Thyroid  o : gland size normal, nontender, no nodules or masses present on palpation  · Respiratory  o Respiratory Effort  o : breathing unlabored  o Auscultation of Lungs  o : normal breath sounds throughout  · Cardiovascular  o Heart  o :   § Auscultation of Heart  § : regular rate and rhythm, no murmurs, gallops or rubs  o Peripheral Vascular System  o :   § Carotid Arteries  § : normal pulses bilaterally, no bruits present  § Pedal Pulses  § : pulses 2 bilaterally  § Extremities  § : no cyanosis, clubbing or edema; less than 2 second refill noted  · Gastrointestinal  o Abdominal Examination  o : abdomen nontender to palpation, tone normal without rigidity or guarding, no masses present, abdominal contour scaphoid  o Liver and spleen  o : no hepatomegaly present, liver nontender to palpation, spleen not palpable  · Neurologic  o Mental Status Examination  o :   § Orientation  § : grossly oriented to person, place and time  o Cranial Nerves  o : cranial nerves intact and symmetric throughout  · Psychiatric  o Mood and Affect  o : mood normal, affect appropriate, denies any SI/HI     breast exam is normal, she is non tender today               Assessment  · Borderline diabetes     790.29/R73.03  · Alcoholism in remission     303.93/F10.21  · Anxiety     300.00/F41.9  · Major depression     296.20/F32.9  · Obesity     278.00/E66.9  · Elevated LFTs     790.6/R79.89  · URI (upper " respiratory infection)     465.9/J06.9      Plan  · Orders  o ACO-39: Current medications updated and reviewed (1159F, ) - - 02/05/2021  o GeneSight [facility] (Send out) (GENES) - 300.00/F41.9, 296.20/F32.9 - 02/05/2021  o CMP [facility] ([id number]) - 790.6/R79.89 - 02/05/2021  o Hgb A1c [facility] ([id number]) - 790.29/R73.03 - 02/05/2021  · Medications  o Mucinex DM  mg oral tablet extended release 12 hr   SIG: take 1 tablet by oral route every 12 hours as needed   DISP: (30) Tablet with 0 refills  Prescribed on 02/05/2021     o buspirone 10 mg oral tablet   SIG: take 1 tablet (10 mg) by oral route 3 times per day for 30 days   DISP: (90) Tablet with 2 refills  Adjusted on 02/05/2021     · Instructions  o we will increase the buspar, continue the zoloft, keep f/u wt psych on the 22nd. We attempted venipuncture this morning but she didn't have anything to drink since yesterday, so she will go elsewhere for labs and will call with reslts. Will do Muecs testing to help aid in better tx of her anxiety and depression  · Disposition  o Call or Return if symptoms worsen or persist.  o F/U appt in 1 month            Electronically Signed by: CLEO Bright -Author on February 5, 2021 09:42:14 AM

## 2021-05-15 VITALS
RESPIRATION RATE: 18 BRPM | HEIGHT: 66 IN | DIASTOLIC BLOOD PRESSURE: 88 MMHG | BODY MASS INDEX: 38.41 KG/M2 | SYSTOLIC BLOOD PRESSURE: 130 MMHG | TEMPERATURE: 98.6 F | OXYGEN SATURATION: 99 % | WEIGHT: 239 LBS | HEART RATE: 89 BPM

## 2021-05-15 VITALS
OXYGEN SATURATION: 98 % | HEART RATE: 89 BPM | WEIGHT: 237 LBS | TEMPERATURE: 98 F | DIASTOLIC BLOOD PRESSURE: 89 MMHG | HEIGHT: 66 IN | SYSTOLIC BLOOD PRESSURE: 116 MMHG | BODY MASS INDEX: 38.09 KG/M2 | RESPIRATION RATE: 16 BRPM

## 2021-05-16 VITALS
SYSTOLIC BLOOD PRESSURE: 133 MMHG | RESPIRATION RATE: 16 BRPM | DIASTOLIC BLOOD PRESSURE: 78 MMHG | HEIGHT: 66 IN | OXYGEN SATURATION: 100 % | WEIGHT: 225.5 LBS | BODY MASS INDEX: 36.24 KG/M2 | HEART RATE: 93 BPM | TEMPERATURE: 98.5 F

## 2021-05-16 VITALS
HEART RATE: 94 BPM | OXYGEN SATURATION: 98 % | BODY MASS INDEX: 36.98 KG/M2 | WEIGHT: 230.12 LBS | HEIGHT: 66 IN | SYSTOLIC BLOOD PRESSURE: 139 MMHG | RESPIRATION RATE: 18 BRPM | DIASTOLIC BLOOD PRESSURE: 90 MMHG | TEMPERATURE: 97.6 F

## 2021-05-16 VITALS
HEIGHT: 66 IN | WEIGHT: 241.06 LBS | RESPIRATION RATE: 18 BRPM | OXYGEN SATURATION: 98 % | DIASTOLIC BLOOD PRESSURE: 93 MMHG | HEART RATE: 83 BPM | BODY MASS INDEX: 38.74 KG/M2 | SYSTOLIC BLOOD PRESSURE: 143 MMHG | TEMPERATURE: 98.3 F

## 2021-06-05 NOTE — PROGRESS NOTES
"   Progress Note      Patient Name: Zhane Traylor   Patient ID: [id number]   Sex: Female   YOB: 1995    Primary Care Provider: Avelina GALLO    Visit Date: May 6, 2021    Provider: CLEO Bright   Location: [facility]   Location Address: [address]  [place], [place]  [id number]   Location Phone: [phone]          Chief Complaint  · depression/anxiety follow up  · referral to derm      History Of Present Illness  Past Medical History/Overview of Patient Symptoms  Zhane Traylor is a 25 year old /White female who presents for evaluation and treatment of:      f/u on anxiety/depression, she is on a new regimen for psych now and it's  a lot better, she is frustrated that she doesn't have a \"diagnosis\" yet but she says she has a lot less anger. SHe is living at home now, her parents still have custody of her daughter but she says it feels more normal now. She has a part time job with an 's office.     alcohol abuse, in remission. Denies any alcohol use. She was arrested for a DUI with her daughter in the car in December. She went to senior care. She lost custody for a time, but is with her daughter now.       acne- has been bad for months, she is using acne wash and has an appt already with dermatology next week, she needs a referral.   Video Conferencing Visit  Zhane Traylor is a 25 year old /White female who is presenting for evaluation via video conferencing via [facility]. Verbal consent obtained before beginning visit.   The following staff were present during this visit:        Past Medical History  Disease Name Date Onset Notes   Alcohol abuse 01/05/2021 --    Alcoholism in remission 02/05/2021 --    Allergic rhinitis --  --    Anxiety 02/05/2021 --    Anxiety disorder 01/05/2021 --    Asthma --  --    Borderline diabetes 02/05/2021 --    Broken Bones --  --    Bronchitis --  --    Depression --  --    High blood pressure -- "  --    Major depression 02/05/2021 --    Obesity 02/05/2021 --    Psychiatric Care --  --    Shortness of Breath --  --    Sinus trouble --  --          Past Surgical History  Procedure Name Date Notes   Dilatation and curettage --  --    [place] Tooth Extraction 2012 --          Medication List  Name Date Started Instructions   buspirone 10 mg oral tablet  take 2 tablets (20 mg) by oral route 2 times per day   Latuda 40 mg oral tablet  take 1 tablet (40 mg) by oral route once daily with food (at least 350 calories)   Singulair 10 mg oral tablet 11/09/2020 take 1 tablet (10 mg) by oral route once daily in the evening   Topamax 50 mg oral tablet  take 1 tablet (50 mg) by oral route 2 times per day   Zyrtec 10 mg oral tablet  take 1 tablet (10 mg) by oral route once daily         Allergy List  Allergen Name Date Reaction Notes   NO KNOWN DRUG ALLERGIES --  --  --        Allergies Reconciled  Family Medical History  Disease Name Relative/Age Notes   Heart Disease  --    Diabetes  --          Social History  Finding Status Start/Stop Quantity Notes   Tobacco Former --/-- --  --          Immunizations  NameDate Admin Mfg Trade Name Lot Number Route Inj VIS Given VIS Publication   [id number]/26/2018 SKB Fluarix, quadrivalent, preservative free [id number] IM RD 10/26/2018 08/07/2015   Comments: Pt tolerated well, left office in stable condition         Review of Systems  · Constitutional  o Denies  o : fatigue, weight gain  · Integument  o Admits  o : acne  o Denies  o : rash  · Psychiatric  o Admits  o : anxiety, depression      Physical Examination  · Constitutional  o Appearance  o : well-nourished, well developed, alert, in no acute distress  · Respiratory  o Respiratory Effort  o : breathing unlabored  · Psychiatric  o Mood and Affect  o : mood normal, affect appropriate, denies any SI/HI     gross neuro intact    moderate acne vulgaris on the face and upper back           Assessment  · Alcoholism in  remission     303.93/F10.21  · Acne vulgaris     706.1/L70.0  · Anxiety and depression       Anxiety disorder, unspecified     300.00/F41.9  Major depressive disorder, single episode, unspecified     300.00/F32.9      Plan  · Orders  o ACO-39: Current medications updated and reviewed (1159F, , 1159F, 1159F) - - 05/06/2021  o DERMATOLOGY CONSULTATION (DERMA) - 706.1/L70.0 - 05/06/2021  · Medications  o Medications have been Reconciled  o Transition of Care or Provider Policy  · Instructions  o encouraged she use a moisturizer for acne prone skin and spf, referral to derm  o referral to derm  o f/u 3 months sooner if needed, continue psych care  · Disposition  o Call or Return if symptoms worsen or persist.            Electronically Signed by: CLEO Bright -Author on May 6, 2021 11:10:00 AM

## 2021-06-22 ENCOUNTER — HOSPITAL ENCOUNTER (OUTPATIENT)
Dept: GENERAL RADIOLOGY | Facility: HOSPITAL | Age: 26
Discharge: HOME | End: 2021-06-22
Admitting: PODIATRIST

## 2021-06-22 PROCEDURE — 73630 X-RAY EXAM OF FOOT: CPT

## 2021-06-23 ENCOUNTER — APPOINTMENT (OUTPATIENT)
Dept: GENERAL RADIOLOGY | Facility: HOSPITAL | Age: 26
End: 2021-06-23

## 2021-06-25 ENCOUNTER — OFFICE VISIT (OUTPATIENT)
Dept: PODIATRY | Facility: CLINIC | Age: 26
End: 2021-06-25

## 2021-06-25 VITALS
DIASTOLIC BLOOD PRESSURE: 58 MMHG | BODY MASS INDEX: 39.7 KG/M2 | WEIGHT: 247 LBS | SYSTOLIC BLOOD PRESSURE: 102 MMHG | HEIGHT: 66 IN | OXYGEN SATURATION: 100 % | TEMPERATURE: 97.3 F | HEART RATE: 75 BPM

## 2021-06-25 DIAGNOSIS — M72.2 PLANTAR FASCIITIS: ICD-10-CM

## 2021-06-25 DIAGNOSIS — M79.671 FOOT PAIN, RIGHT: Primary | ICD-10-CM

## 2021-06-25 PROCEDURE — 20550 NJX 1 TENDON SHEATH/LIGAMENT: CPT | Performed by: PODIATRIST

## 2021-06-25 PROCEDURE — 99203 OFFICE O/P NEW LOW 30 MIN: CPT | Performed by: PODIATRIST

## 2021-06-25 RX ORDER — DICLOFENAC SODIUM 75 MG/1
75 TABLET, DELAYED RELEASE ORAL 2 TIMES DAILY
Qty: 60 TABLET | Refills: 1 | Status: SHIPPED | OUTPATIENT
Start: 2021-06-25 | End: 2021-07-28

## 2021-06-25 RX ORDER — TRIAMCINOLONE ACETONIDE 40 MG/ML
20 INJECTION, SUSPENSION INTRA-ARTICULAR; INTRAMUSCULAR ONCE
Status: COMPLETED | OUTPATIENT
Start: 2021-06-25 | End: 2021-06-25

## 2021-06-25 RX ORDER — DEXAMETHASONE SODIUM PHOSPHATE 4 MG/ML
2 INJECTION, SOLUTION INTRA-ARTICULAR; INTRALESIONAL; INTRAMUSCULAR; INTRAVENOUS; SOFT TISSUE ONCE
Status: COMPLETED | OUTPATIENT
Start: 2021-06-25 | End: 2021-06-25

## 2021-06-25 RX ORDER — LIDOCAINE HYDROCHLORIDE 10 MG/ML
0.5 INJECTION, SOLUTION INFILTRATION; PERINEURAL ONCE
Status: COMPLETED | OUTPATIENT
Start: 2021-06-25 | End: 2021-06-25

## 2021-06-25 RX ADMIN — DEXAMETHASONE SODIUM PHOSPHATE 2 MG: 4 INJECTION, SOLUTION INTRA-ARTICULAR; INTRALESIONAL; INTRAMUSCULAR; INTRAVENOUS; SOFT TISSUE at 08:10

## 2021-06-25 RX ADMIN — TRIAMCINOLONE ACETONIDE 20 MG: 40 INJECTION, SUSPENSION INTRA-ARTICULAR; INTRAMUSCULAR at 08:12

## 2021-06-25 RX ADMIN — LIDOCAINE HYDROCHLORIDE 0.5 ML: 10 INJECTION, SOLUTION INFILTRATION; PERINEURAL at 08:11

## 2021-06-25 NOTE — PROGRESS NOTES
[facility] - PODIATRY    Today's Date: 06/25/21    Patient Name: Zhane Traylor  MRN: [id number]  CSN: [id number]  PCP: Avelina Joyner APRN  Referring Provider: No ref. provider found    SUBJECTIVE     Chief Complaint   Patient presents with   • Right Foot - Pain     HPI: Zhane Traylor, a 25 y.o.female, comes to clinic.    New, Established, New Problem: New    Location: Right heel    Duration: Summer 2021    Onset:  gradual    Nature:  achy, sharp, shooting    Stable, worsening, improving: Worsening    Aggravating factors: Patient relates a weight gain of 30 pounds over the past year    Patient describes morning right heel pain as stabbing, burning, or aching. This pain usually subsides throughout the day, however it returns after periods of rest and sitting, when standing back up.  on their feet, and again the next morning.      Previous Treatment: None    Patient denies any fevers, chills, nausea, vomiting, shortness of breathe, nor any other constitutional signs nor symptoms.    No other pedal complaints at this time.    Past Medical History:   Diagnosis Date   • Alcohol abuse 01/05/2021   • Alcoholism in remission ([facility]) 02/05/2021   • Allergic rhinitis    • Anxiety 02/05/2021   • Anxiety disorder 01/05/2021   • Asthma    • Borderline diabetes 02/05/2021   • Broken bones    • Bronchitis    • Callus    • Depression    • Fracture of left foot    • H/O psychiatric care    • HBP (high blood pressure)    • Ingrown toenail    • Major depression 02/05/2021   • Obesity    • Onychomycosis    • Sinus trouble    • SOB (shortness of breath)      Past Surgical History:   Procedure Laterality Date   • DILATATION AND CURETTAGE     • [place] TOOTH EXTRACTION  2012     Family History   Problem Relation Age of Onset   • Heart disease Other    • Diabetes Other      Social History     Socioeconomic History   • Marital status: Single     Spouse name: Not on file   • Number of children: Not on file   •  Years of education: Not on file   • Highest education level: Not on file   Tobacco Use   • Smoking status: Former Smoker   • Smokeless tobacco: Never Used   Vaping Use   • Vaping Use: Never used   Substance and Sexual Activity   • Alcohol use: Never   • Drug use: Never   • Sexual activity: Defer     No Known Allergies  Current Outpatient Medications   Medication Sig Dispense Refill   • clorazepate (TRANXENE) 7.5 MG tablet take 1 tablet by mouth twice a day as needed for anxiety 60 tablet 0   • Latuda 40 MG tablet tablet Take 1 tablet by mouth every night at bedtime. 30 tablet 2   • topiramate (TOPAMAX) 50 MG tablet Take 1 tablet by mouth 2 (two) times a day. 60 tablet 1   • topiramate (TOPAMAX) 50 MG tablet Take 1 tablet by mouth 2 (two) times a day. 60 tablet 2   • busPIRone (BUSPAR) 10 MG tablet Take 1 tablet by mouth 4 (Four) Times a Day. 120 tablet 0   • clindamycin (CLEOCIN T) 1 % lotion Apply a thin layer daily to Chest and Back. 60 mL 10   • clindamycin-benzoyl peroxide (BENZACLIN) 1-5 % gel Apply to affected areas at bedtime 45 g 10   • diclofenac (VOLTAREN) 75 MG EC tablet Take 1 tablet by mouth 2 (Two) Times a Day for 30 days. 60 tablet 1   • venlafaxine XR (EFFEXOR-XR) 37.5 MG 24 hr capsule Take 1 capsule by mouth Daily with food 30 capsule 0     Current Facility-Administered Medications   Medication Dose Route Frequency Provider Last Rate Last Admin   • dexamethasone sodium phosphate injection 2 mg  2 mg Intramuscular Once Ramon John DPM       • lidocaine (XYLOCAINE) 1 % injection 0.5 mL  0.5 mL Infiltration Once Ramon John DPM       • triamcinolone acetonide (KENALOG-40) injection 20 mg  20 mg Intramuscular Once Ramon John DPM         Review of Systems   Constitutional: Negative.    Musculoskeletal:        Pain in right heel   All other systems reviewed and are negative.      OBJECTIVE     Vitals:    06/25/21 0716   BP: 102/58   Pulse: 75   Temp: 97.3 °F (36.3 °C)    SpO2: 100%       PHYSICAL EXAM     Foot/Ankle Exam:       General:   Appearance: obesity    Orientation: AAOx3    Affect: appropriate    Gait: unimpaired    Shoe Gear:  Sandals    VASCULAR      Right Foot Vascularity   Normal vascular exam    Dorsalis pedis:  2+  Posterior tibial:  2+  Skin Temperature: warm    Edema Grading:  None  CFT:  < 3 seconds  Pedal Hair Growth:  Present  Varicosities: none       Left Foot Vascularity   Normal vascular exam    Dorsalis pedis:  2+  Posterior tibial:  2+  Skin Temperature: warm    Edema Grading:  None  CFT:  < 3 seconds  Pedal Hair Growth:  Present  Varicosities: none        NEUROLOGIC     Right Foot Neurologic   Normal sensation    Light touch sensation:  Normal  Vibratory sensation:  Normal  Hot/Cold sensation: normal       Left Foot Neurologic   Normal sensation    Light touch sensation:  Normal  Vibratory sensation:  Normal  Hot/cold sensation: normal       MUSCULOSKELETAL      Right Foot Musculoskeletal   Ecchymosis:  None  Tenderness: plantar fascia and plantar heel       Left Foot Musculoskeletal   Ecchymosis:  None  Tenderness: none       MUSCLE STRENGTH     Right Foot Muscle Strength   Normal strength    Foot dorsiflexion:  5  Foot plantar flexion:  5  Foot inversion:  5  Foot eversion:  5     Left Foot Muscle Strength   Normal strength    Foot dorsiflexion:  5  Foot plantar flexion:  5  Foot inversion:  5  Foot eversion:  5     DERMATOLOGIC     Right Foot Dermatologic   Skin: skin intact       Left Foot Dermatologic   Skin: skin intact        RADIOLOGY:    XR Foot 3+ View Right    Result Date: 6/22/2021  Narrative: PROCEDURE: XR FOOT 3+ VW RIGHT  COMPARISON: None.  INDICATIONS: RIGHT HEEL PAIN FOR ONE YEAR, WORSENING. NO INJURY.  FINDINGS:  No acute fracture or dislocation is seen.  Joint spaces are intact.  There is a plantar calcaneal spur.  CONCLUSION:  1. Plantar calcaneal spur.  2. No acute osseous abnormality seen of the right foot.      LISBETH TUCKER MD    "    Electronically Signed and Approved By: LISBETH TUCKER MD on 6/22/2021 at 17:51                ASSESSMENT/PLAN     Diagnoses and all orders for this visit:    1. Foot pain, right (Primary)    2. Plantar fasciitis  -     dexamethasone sodium phosphate injection 2 mg  -     lidocaine (XYLOCAINE) 1 % injection 0.5 mL  -     triamcinolone acetonide (KENALOG-40) injection 20 mg  -     diclofenac (VOLTAREN) 75 MG EC tablet; Take 1 tablet by mouth 2 (Two) Times a Day for 30 days.  Dispense: 60 tablet; Refill: 1      Comprehensive lower extremity examination and evaluation was performed.    Discussed findings and treatment plan including risks, benefits, and treatment options with patient in detail. Patient agreed with treatment plan.    Plantar Fasciits Injection:    Date/Time: 06/25/2021  Performed by: Ramon John DPM  Authorized by: Ramon John DPM     Consent: Verbal consent obtained. Written consent obtained.  Risks and benefits: risks, benefits and alternatives were discussed  Consent given by: patient  Patient identity confirmed: verbally with patient  Indications: pain relief    Injection site: Right heel.    Sedation:  none    Patient position: sitting  Needle size: 27 G, 1 1/4\" in length  Injection medications:  0.5 ml 1% Lidocaine plain, 0.5 ml Kenalog 40 mg/ml, and 0.5 ml Decadron 4 mg/mL.   Outcome: pain improved    Patient tolerated the procedure well with no immediate complications.    Patient may begin to weight bear as tolerated in supportive shoes.  No impact activities for two weeks.  After that time, the patient may increase activities as tolerated. Patient states understanding and agreement with this plan.      Dr. John recommended purchase and use of OTC Spenco Orthotics.  The patient states understanding and agreement with this plan.    Discussed proper shoegear for the patient's feet and medical condition.  Patient states understanding and agreement with this plan.  "     Patient is to monitor for recurrence and any new symptoms and to contact Dr. John's office for a follow-up appointment.      The patient states understanding and agreement with this plan.        An After Visit Summary was printed and given to the patient at discharge, including (if requested) any available informative/educational handouts regarding diagnosis, treatment, or medications. All questions were answered to patient/family satisfaction. Should symptoms fail to improve or worsen they agree to call or return to clinic or to go to the Emergency Department. Discussed the importance of following up with any needed screening tests/labs/specialist appointments and any requested follow-up recommended by me today. Importance of maintaining follow-up discussed and patient accepts that missed appointments can delay diagnosis and potentially lead to worsening of conditions.    Return in about 4 weeks (around 7/23/2021) for Plantar fasciitis right injection follow-up., or sooner if acute issues arise.    This document has been electronically signed by Ramon John DPM on June 25, 2021 07:54 EDT

## 2021-06-25 NOTE — PATIENT INSTRUCTIONS
Patient may begin to weight bear as tolerated in supportive shoes.  No impact activities for two weeks.  After that time, the patient may increase activities as tolerated. Patient states understanding and agreement with this plan.      Discussed proper shoegear for the patient's feet and medical condition.  Patient states understanding and agreement with this plan.      Dr. John recommended purchase and use of OTC Spenco Orthotics.  The patient states understanding and agreement with this plan.    Patient is to monitor for recurrence and any new symptoms and to contact Dr. John's office for a follow-up appointment.      The patient states understanding and agreement with this plan.

## 2021-07-27 ENCOUNTER — OFFICE VISIT (OUTPATIENT)
Dept: PODIATRY | Facility: CLINIC | Age: 26
End: 2021-07-27

## 2021-07-27 VITALS
DIASTOLIC BLOOD PRESSURE: 62 MMHG | HEIGHT: 66 IN | HEART RATE: 84 BPM | WEIGHT: 236 LBS | OXYGEN SATURATION: 100 % | SYSTOLIC BLOOD PRESSURE: 110 MMHG | TEMPERATURE: 97.5 F | BODY MASS INDEX: 37.93 KG/M2

## 2021-07-27 DIAGNOSIS — M72.2 PLANTAR FASCIITIS: ICD-10-CM

## 2021-07-27 DIAGNOSIS — M79.671 FOOT PAIN, RIGHT: Primary | ICD-10-CM

## 2021-07-27 PROCEDURE — 99213 OFFICE O/P EST LOW 20 MIN: CPT | Performed by: PODIATRIST

## 2021-07-27 PROCEDURE — 20550 NJX 1 TENDON SHEATH/LIGAMENT: CPT | Performed by: PODIATRIST

## 2021-07-27 RX ORDER — DEXAMETHASONE SODIUM PHOSPHATE 4 MG/ML
2 INJECTION, SOLUTION INTRA-ARTICULAR; INTRALESIONAL; INTRAMUSCULAR; INTRAVENOUS; SOFT TISSUE ONCE
Status: COMPLETED | OUTPATIENT
Start: 2021-07-27 | End: 2021-07-27

## 2021-07-27 RX ORDER — CETIRIZINE HYDROCHLORIDE 10 MG/1
TABLET ORAL
COMMUNITY

## 2021-07-27 RX ORDER — LIDOCAINE HYDROCHLORIDE 10 MG/ML
0.5 INJECTION, SOLUTION INFILTRATION; PERINEURAL ONCE
Status: COMPLETED | OUTPATIENT
Start: 2021-07-27 | End: 2021-07-27

## 2021-07-27 RX ORDER — TRIAMCINOLONE ACETONIDE 40 MG/ML
20 INJECTION, SUSPENSION INTRA-ARTICULAR; INTRAMUSCULAR ONCE
Status: COMPLETED | OUTPATIENT
Start: 2021-07-27 | End: 2021-07-27

## 2021-07-27 RX ADMIN — LIDOCAINE HYDROCHLORIDE 0.5 ML: 10 INJECTION, SOLUTION INFILTRATION; PERINEURAL at 08:27

## 2021-07-27 RX ADMIN — TRIAMCINOLONE ACETONIDE 20 MG: 40 INJECTION, SUSPENSION INTRA-ARTICULAR; INTRAMUSCULAR at 08:28

## 2021-07-27 RX ADMIN — DEXAMETHASONE SODIUM PHOSPHATE 2 MG: 4 INJECTION, SOLUTION INTRA-ARTICULAR; INTRALESIONAL; INTRAMUSCULAR; INTRAVENOUS; SOFT TISSUE at 08:26

## 2021-07-27 NOTE — PROGRESS NOTES
[facility] - PODIATRY    Today's Date: 07/27/21    Patient Name: Zhane Traylor  MRN: [id number]  CSN: [id number]  PCP: Avelina Joyner APRN  Referring Provider: No ref. provider found    SUBJECTIVE     Chief Complaint   Patient presents with   • Right Foot - Plantar Fasciitis     HPI: Zhane Traylor, a 26 y.o.female, comes to clinic.    New, Established, New Problem: Established    Location: Right heel    Duration: Summer 2021    Onset:  gradual    Nature:  achy, sharp, shooting    Stable, worsening, improving: Modest improvement but states it still hurts at night for another cortisone injection.    Aggravating factors: Patient relates a weight gain of 30 pounds over the past year    Patient describes morning right heel pain as stabbing, burning, or aching. This pain usually subsides throughout the day, however it returns after periods of rest and sitting, when standing back up.  on their feet, and again the next morning.      Previous Treatment: Cortisone injection    Patient denies any fevers, chills, nausea, vomiting, shortness of breathe, nor any other constitutional signs nor symptoms.    No other pedal complaints at this time.    Past Medical History:   Diagnosis Date   • Alcohol abuse 01/05/2021   • Alcoholism in remission (CMS/[facility]) 02/05/2021   • Allergic rhinitis    • Anxiety 02/05/2021   • Anxiety disorder 01/05/2021   • Asthma    • Borderline diabetes 02/05/2021   • Broken bones    • Bronchitis    • Callus    • Depression    • Fracture of left foot    • H/O psychiatric care    • HBP (high blood pressure)    • Ingrown toenail    • Major depression 02/05/2021   • Obesity    • Onychomycosis    • Sinus trouble    • SOB (shortness of breath)      Past Surgical History:   Procedure Laterality Date   • DILATATION AND CURETTAGE     • WISDOM TOOTH EXTRACTION  2012     Family History   Problem Relation Age of Onset   • Heart disease Other    • Diabetes Other      Social History      Socioeconomic History   • Marital status: Single     Spouse name: Not on file   • Number of children: Not on file   • Years of education: Not on file   • Highest education level: Not on file   Tobacco Use   • Smoking status: Former Smoker   • Smokeless tobacco: Never Used   Vaping Use   • Vaping Use: Never used   Substance and Sexual Activity   • Alcohol use: Never   • Drug use: Never   • Sexual activity: Defer     No Known Allergies  Current Outpatient Medications   Medication Sig Dispense Refill   • cetirizine (ZyrTEC Allergy) 10 MG tablet Zyrtec 10 mg oral tablet take 1 tablet (10 mg) by oral route once daily   Active     • clindamycin (CLEOCIN T) 1 % lotion Apply a thin layer daily to Chest and Back. 60 mL 10   • clindamycin-benzoyl peroxide (BENZACLIN) 1-5 % gel Apply to affected areas at bedtime 45 g 10   • clorazepate (TRANXENE) 7.5 MG tablet take 1 tablet by mouth twice as day as needed for anxiety 60 tablet 0   • diclofenac (VOLTAREN) 75 MG EC tablet Take 1 tablet by mouth 2 (Two) Times a Day for 30 days. 60 tablet 1   • Latuda 40 MG tablet tablet Take 1 tablet by mouth every night at bedtime. 30 tablet 2   • topiramate (TOPAMAX) 50 MG tablet Take 1 tablet by mouth 2 (two) times a day. 60 tablet 1   • busPIRone (BUSPAR) 10 MG tablet Take 1 tablet by mouth 4 (Four) Times a Day. 120 tablet 0   • topiramate (TOPAMAX) 50 MG tablet Take 1 tablet by mouth 2 (two) times a day. 60 tablet 2   • venlafaxine XR (EFFEXOR-XR) 37.5 MG 24 hr capsule Take 1 capsule by mouth Daily with food 30 capsule 0     Current Facility-Administered Medications   Medication Dose Route Frequency Provider Last Rate Last Admin   • dexamethasone sodium phosphate injection 2 mg  2 mg Intramuscular Once Ramon John DPM       • lidocaine (XYLOCAINE) 1 % injection 0.5 mL  0.5 mL Infiltration Once Ramon John DPM       • triamcinolone acetonide (KENALOG-40) injection 20 mg  20 mg Intramuscular Once Ramon John  EBEN Huose         Review of Systems   Constitutional: Negative.    Musculoskeletal:        Pain in right heel   All other systems reviewed and are negative.      OBJECTIVE     Vitals:    07/27/21 0735   BP: 110/62   Pulse: 84   Temp: 97.5 °F (36.4 °C)   SpO2: 100%       PHYSICAL EXAM     Foot/Ankle Exam:       General:   Appearance: obesity    Orientation: AAOx3    Affect: appropriate    Gait: unimpaired    Shoe Gear:  Sandals    VASCULAR      Right Foot Vascularity   Normal vascular exam    Dorsalis pedis:  2+  Posterior tibial:  2+  Skin Temperature: warm    Edema Grading:  None  CFT:  < 3 seconds  Pedal Hair Growth:  Present  Varicosities: none       Left Foot Vascularity   Normal vascular exam    Dorsalis pedis:  2+  Posterior tibial:  2+  Skin Temperature: warm    Edema Grading:  None  CFT:  < 3 seconds  Pedal Hair Growth:  Present  Varicosities: none        NEUROLOGIC     Right Foot Neurologic   Normal sensation    Light touch sensation:  Normal  Vibratory sensation:  Normal  Hot/Cold sensation: normal       Left Foot Neurologic   Normal sensation    Light touch sensation:  Normal  Vibratory sensation:  Normal  Hot/cold sensation: normal       MUSCULOSKELETAL      Right Foot Musculoskeletal   Ecchymosis:  None  Tenderness: plantar fascia and plantar heel       Left Foot Musculoskeletal   Ecchymosis:  None  Tenderness: none       MUSCLE STRENGTH     Right Foot Muscle Strength   Normal strength    Foot dorsiflexion:  5  Foot plantar flexion:  5  Foot inversion:  5  Foot eversion:  5     Left Foot Muscle Strength   Normal strength    Foot dorsiflexion:  5  Foot plantar flexion:  5  Foot inversion:  5  Foot eversion:  5     DERMATOLOGIC     Right Foot Dermatologic   Skin: skin intact       Left Foot Dermatologic   Skin: skin intact        RADIOLOGY:    No results found.     ASSESSMENT/PLAN     Diagnoses and all orders for this visit:    1. Foot pain, right (Primary)    2. Plantar fasciitis  -     triamcinolone  "acetonide (KENALOG-40) injection 20 mg  -     lidocaine (XYLOCAINE) 1 % injection 0.5 mL  -     dexamethasone sodium phosphate injection 2 mg      Comprehensive lower extremity examination and evaluation was performed.    Discussed findings and treatment plan including risks, benefits, and treatment options with patient in detail. Patient agreed with treatment plan.    Plantar Fasciits Injection:    Date/Time: 07/27/2021  Performed by: Ramon John DPM  Authorized by: Ramon John DPM     Consent: Verbal consent obtained. Written consent obtained.  Risks and benefits: risks, benefits and alternatives were discussed  Consent given by: patient  Patient identity confirmed: verbally with patient  Indications: pain relief    Injection site: Right heel.    Sedation:  none    Patient position: sitting  Needle size: 27 G, 1 1/4\" in length  Injection medications:  0.5 ml 1% Lidocaine plain, 0.5 ml Kenalog 40 mg/ml, and 0.5 ml Decadron 4 mg/mL.   Outcome: pain improved    Patient tolerated the procedure well with no immediate complications.    Patient may begin to weight bear as tolerated in supportive shoes.  No impact activities for two weeks.  After that time, the patient may increase activities as tolerated. Patient states understanding and agreement with this plan.      Dr. John recommended purchase and use of OTC Spenco Orthotics.  The patient states understanding and agreement with this plan.    Discussed proper shoegear for the patient's feet and medical condition.  Patient states understanding and agreement with this plan.      Rice Therapy: It is important to treat any injury as soon as possible to help control swelling and increase recovery time. The recognized regimen for immediate treatment of sport injuries includes rest, ice (cold application), compression, and elevation (RICE). Remove the injured athlete from play, apply ice to the affected area, wrap or compress the injured area with an " elastic bandage when appropriate, and elevate the injured area above heart level to reduce swelling.  The patient is to not use ice for longer than 20 minutes at a time, with at least 20 minutes of no ice usage between applications.    Patient is to monitor for recurrence and any new symptoms and to contact Dr. John's office for a follow-up appointment.      The patient states understanding and agreement with this plan.      An After Visit Summary was printed and given to the patient at discharge, including (if requested) any available informative/educational handouts regarding diagnosis, treatment, or medications. All questions were answered to patient/family satisfaction. Should symptoms fail to improve or worsen they agree to call or return to clinic or to go to the Emergency Department. Discussed the importance of following up with any needed screening tests/labs/specialist appointments and any requested follow-up recommended by me today. Importance of maintaining follow-up discussed and patient accepts that missed appointments can delay diagnosis and potentially lead to worsening of conditions.    Return if symptoms worsen or fail to improve., or sooner if acute issues arise.    This document has been electronically signed by Ramon John DPM on July 27, 2021 08:13 EDT

## 2021-09-08 PROCEDURE — U0004 COV-19 TEST NON-CDC HGH THRU: HCPCS | Performed by: NURSE PRACTITIONER

## 2021-09-28 ENCOUNTER — TELEPHONE (OUTPATIENT)
Dept: FAMILY MEDICINE CLINIC | Facility: CLINIC | Age: 26
End: 2021-09-28

## 2021-09-28 ENCOUNTER — TELEMEDICINE (OUTPATIENT)
Dept: FAMILY MEDICINE CLINIC | Facility: CLINIC | Age: 26
End: 2021-09-28

## 2021-09-28 DIAGNOSIS — R79.89 ELEVATED LFTS: ICD-10-CM

## 2021-09-28 DIAGNOSIS — J45.30 MILD PERSISTENT ASTHMA WITHOUT COMPLICATION: ICD-10-CM

## 2021-09-28 DIAGNOSIS — R53.83 FATIGUE, UNSPECIFIED TYPE: ICD-10-CM

## 2021-09-28 DIAGNOSIS — R73.03 BORDERLINE DIABETES: ICD-10-CM

## 2021-09-28 DIAGNOSIS — J30.2 SEASONAL ALLERGIES: ICD-10-CM

## 2021-09-28 DIAGNOSIS — F41.9 ANXIETY: ICD-10-CM

## 2021-09-28 DIAGNOSIS — R05.9 COUGH: Primary | ICD-10-CM

## 2021-09-28 DIAGNOSIS — F33.1 MAJOR DEPRESSIVE DISORDER, RECURRENT, MODERATE (HCC): ICD-10-CM

## 2021-09-28 PROCEDURE — 99214 OFFICE O/P EST MOD 30 MIN: CPT | Performed by: NURSE PRACTITIONER

## 2021-09-28 RX ORDER — LEVALBUTEROL TARTRATE 45 UG/1
1-2 AEROSOL, METERED ORAL EVERY 4 HOURS PRN
COMMUNITY
End: 2021-09-28 | Stop reason: SDUPTHER

## 2021-09-28 RX ORDER — LEVALBUTEROL TARTRATE 45 UG/1
1-2 AEROSOL, METERED ORAL EVERY 4 HOURS PRN
Qty: 15 G | Refills: 1 | Status: SHIPPED | OUTPATIENT
Start: 2021-09-28 | End: 2022-10-07 | Stop reason: SDUPTHER

## 2021-09-28 RX ORDER — AZITHROMYCIN 250 MG/1
TABLET, FILM COATED ORAL
Qty: 6 TABLET | Refills: 0 | Status: SHIPPED | OUTPATIENT
Start: 2021-09-28 | End: 2021-10-19

## 2021-09-28 RX ORDER — LEVALBUTEROL INHALATION SOLUTION 0.63 MG/3ML
1 SOLUTION RESPIRATORY (INHALATION) EVERY 4 HOURS PRN
COMMUNITY
End: 2021-09-28 | Stop reason: SDUPTHER

## 2021-09-28 NOTE — TELEPHONE ENCOUNTER
Caller: Zhane Traylor    Relationship: Self    Best call back number: [phone]    What orders are you requesting (i.e. lab or imaging): PATIENT WAS SUPPOSED TO HAVE CHEST XRAY AND LABS ORDERED. SHE CALLED THE HOSPITAL AND NOTHING GOT ORDERED. PLEASE CALL AND ADVISE. SHE IS ALSO WAITING ON ANTIBIOTICS TO BE CALLED IN     In what timeframe would the patient need to come in: ASAP

## 2021-09-28 NOTE — PROGRESS NOTES
Chief Complaint  Cough, Shortness of Breath, and Nasal Congestion    Subjective      Patient understanding that this is a telehealth visit.  I cannot perform a full physical exam, therefore something might be missed, or patient may need to come into the office for further evaluation.  Patient is understanding and consents to this type of visit.    History of Present Illness  Zhane Traylor presents to [facility]     Cough and congestion for 3 weeks. She went to urgent care and was started on steroids. She had a negative covid test. She is having more trouble with shortness of breath, she has chest pain with deep breaths at times. She is using advair and albuterol    Her heart rate has been 102.    She has been vaccinated against covid    Borderline diabetes, she has gained weight.     She doesn't feel right. She feels foggy headed. Almost like anxiety.     Mental health is doing better. She is on a new combination of meds.       Past Medical History:   • Alcohol abuse   • Alcoholism in remission (CMS/[facility])   • Allergic rhinitis   • Anxiety   • Anxiety disorder   • Asthma   • Borderline diabetes   • Broken bones   • Bronchitis   • Callus   • Depression   • Fracture of left foot   • H/O psychiatric care   • HBP (high blood pressure)   • Ingrown toenail   • Major depression   • Obesity   • Onychomycosis   • Sinus trouble   • SOB (shortness of breath)       Allergies  Patient has no known allergies.    Past Surgical History:   • DILATATION AND CURETTAGE   • WISDOM TOOTH EXTRACTION       Social History     Tobacco Use   • Smoking status: Former Smoker   • Smokeless tobacco: Never Used   Vaping Use   • Vaping Use: Never used   Substance Use Topics   • Alcohol use: Never   • Drug use: Never       Family History   Problem Relation Age of Onset   • Heart disease Other    • Diabetes Other         Health Maintenance Due   Topic Date Due   • ANNUAL PHYSICAL  Never done   • Pneumococcal Vaccine  0-64 (1 of 2 - PPSV23) Never done   • HPV VACCINES (1 - 2-dose series) Never done   • TDAP/TD VACCINES (2 - Td or Tdap) 07/03/2017   • HEPATITIS C SCREENING  Never done   • PAP SMEAR  Never done          Current Outpatient Medications:   •  Advair Diskus 250-50 MCG/DOSE DISKUS, Inhale 1 puff 2 (Two) Times a Day. For asthma. Rinse mouth after use, Disp: 60 each, Rfl: 0  •  cetirizine (ZyrTEC Allergy) 10 MG tablet, Zyrtec 10 mg oral tablet take 1 tablet (10 mg) by oral route once daily   Active, Disp: , Rfl:   •  clindamycin (CLEOCIN T) 1 % lotion, Apply a thin layer daily to Chest and Back., Disp: 60 mL, Rfl: 10  •  clindamycin-benzoyl peroxide (BENZACLIN) 1-5 % gel, Apply to affected areas at bedtime, Disp: 45 g, Rfl: 10  •  clindamycin-benzoyl peroxide (BENZACLIN) 1-5 % gel, Apply to face at bedtime, Disp: 25 g, Rfl: 11  •  clorazepate (TRANXENE) 7.5 MG tablet, Tkae 1 tablet by mouth twice daily as needed for anxiety, Disp: 60 tablet, Rfl: 0  •  clorazepate (TRANXENE) 7.5 MG tablet, Take 1 tablet by mouth 2 (Two) Times a Day As Needed for anxiety., Disp: 60 tablet, Rfl: 0  •  guaiFENesin 200 MG tablet, Take 2 tablets by mouth Every 6 (Six) Hours As Needed for Cough. And congestion, Disp: 30 tablet, Rfl: 0  •  lamoTRIgine (LaMICtal) 25 MG tablet, Take 2 tablets by mouth 2 (two) times a day., Disp: 120 tablet, Rfl: 2  •  Latuda 40 MG tablet tablet, Take 1 tablet by mouth every night at bedtime., Disp: 30 tablet, Rfl: 2  •  azithromycin (Zithromax Z-Bin) 250 MG tablet, Take 2 tablets by mouth on day 1, then 1 tablet daily on days 2-5, Disp: 6 tablet, Rfl: 0  •  benzonatate (TESSALON) 100 MG capsule, Take 1 capsule by mouth 3 (Three) Times a Day As Needed for Cough., Disp: 30 capsule, Rfl: 0  •  lamoTRIgine (LaMICtal) 25 MG tablet, Take 1 tablet two (2) times a day., Disp: 60 tablet, Rfl: 1  •  lurasidone (Latuda) 40 MG tablet tablet, Take 1 oral tablet at bedtime, Disp: 30 tablet, Rfl: 2  •  topiramate (TOPAMAX) 50 MG  tablet, Take 1 tablet by mouth 2 (two) times a day., Disp: 60 tablet, Rfl: 1  •  tretinoin (RETIN-A) 0.025 % cream, Apply to face at bedtime, Disp: 20 g, Rfl: 11    Immunization History   Administered Date(s) Administered   • COVID-19 (PFIZER) 08/03/2021, 08/24/2021       Objective     There were no vitals filed for this visit.  There is no height or weight on file to calculate BMI.     Review of Systems    Physical Exam    Gen: well-nourished, no acute distress  HENT: atraumatic, normocephalic  Eyes: extraocular movements intact, no scleral icterus  Lung: breathing comfortably, no cough  Skin: no visible rash, no lesions  Neuro: grossly oriented to person, place, and time. no facial droop   Psych: normal mood and affect      Result Review :    The following data was reviewed by: CLEO Bright on 09/28/2021:    Common labs    Common Labsle 11/9/20 11/9/20 2/5/21 2/5/21    1857 1857 1413 1413   Glucose 162 (A)   95   BUN 17   21   Creatinine 0.83   0.72   Sodium 135   137   Potassium 4.6   4.4   Chloride 101   101   Calcium 9.3   9.2   Albumin 4.2   4.1   Total Bilirubin <0.15 (A)   <0.15 (A)   Alkaline Phosphatase 128 (A)   118 (A)   AST (SGOT) 88 (A)   18   ALT (SGPT) 157 (A)   25   WBC 6.97      Hemoglobin 14.0      Hematocrit 44.9      Platelets 271      Total Cholesterol 189      Triglycerides 192 (A)      HDL Cholesterol 51      LDL Cholesterol  100      Hemoglobin A1C  6.0 (A) 5.6    (A) Abnormal value       Comments are available for some flowsheets but are not being displayed.                           Assessment and Plan     Diagnoses and all orders for this visit:    1. Cough (Primary)  -     XR Chest PA & Lateral; Future  -     azithromycin (Zithromax Z-Bin) 250 MG tablet; Take 2 tablets by mouth on day 1, then 1 tablet daily on days 2-5  Dispense: 6 tablet; Refill: 0    2. Mild persistent asthma without complication  Comments:  Continue Advair and albuterol, advised albuterol may elevate  her heart rate  Orders:  -     XR Chest PA & Lateral; Future    3. Major depressive disorder, recurrent, moderate (CMS/HCC)  Comments:  Currently stable with medication regimen    4. Anxiety  Comments:  Currently stable with medication regimen from psychiatry    5. Seasonal allergies    6. Fatigue, unspecified type  Comments:  She will follow up in 2 weeks for an exam here in the clinic will call with chest x-ray results and labs  Orders:  -     CBC w AUTO Differential; Future  -     TSH; Future    7. Borderline diabetes  Comments:  Work on diet, check labs  Orders:  -     Hemoglobin A1c; Future  -     Lipid Panel With LDL/HDL Ratio; Future    8. Elevated LFTs  -     Comprehensive metabolic panel; Future            Follow Up     Return in about 2 weeks (around 10/12/2021).    Patient was given instructions and counseling regarding her condition or for health maintenance advice. Please see specific information pulled into the AVS if appropriate.     Zhane Traylor  reports that she has quit smoking. She has never used smokeless tobacco.           CLEO Bright

## 2021-09-28 NOTE — TELEPHONE ENCOUNTER
Patient on albuterol now, her usual xopenex. Can we RX xopenox?    Per verbal order Avelina RX sent to [facility] patient was rx Xopenex HFA  inhaler in .

## 2021-09-29 ENCOUNTER — HOSPITAL ENCOUNTER (OUTPATIENT)
Dept: GENERAL RADIOLOGY | Facility: HOSPITAL | Age: 26
Discharge: HOME | End: 2021-09-29

## 2021-09-29 ENCOUNTER — LAB (OUTPATIENT)
Dept: LAB | Facility: HOSPITAL | Age: 26
End: 2021-09-29

## 2021-09-29 DIAGNOSIS — J45.30 MILD PERSISTENT ASTHMA WITHOUT COMPLICATION: ICD-10-CM

## 2021-09-29 DIAGNOSIS — R05.9 COUGH: ICD-10-CM

## 2021-09-29 DIAGNOSIS — R53.83 FATIGUE, UNSPECIFIED TYPE: ICD-10-CM

## 2021-09-29 PROCEDURE — 71046 X-RAY EXAM CHEST 2 VIEWS: CPT

## 2021-09-30 ENCOUNTER — LAB (OUTPATIENT)
Dept: LAB | Facility: HOSPITAL | Age: 26
End: 2021-09-30

## 2021-09-30 DIAGNOSIS — R73.03 BORDERLINE DIABETES: ICD-10-CM

## 2021-09-30 DIAGNOSIS — R53.83 FATIGUE, UNSPECIFIED TYPE: ICD-10-CM

## 2021-09-30 DIAGNOSIS — R79.89 ELEVATED LFTS: ICD-10-CM

## 2021-09-30 LAB
ALBUMIN SERPL-MCNC: 4.2 G/DL (ref 3.5–5.2)
ALBUMIN/GLOB SERPL: 1.3 G/DL
ALP SERPL-CCNC: 86 U/L (ref 39–117)
ALT SERPL W P-5'-P-CCNC: 13 U/L (ref 1–33)
ANION GAP SERPL CALCULATED.3IONS-SCNC: 9.3 MMOL/L (ref 5–15)
AST SERPL-CCNC: 15 U/L (ref 1–32)
BASOPHILS # BLD AUTO: 0.06 10*3/MM3 (ref 0–0.2)
BASOPHILS NFR BLD AUTO: 0.7 % (ref 0–1.5)
BILIRUB SERPL-MCNC: 0.3 MG/DL (ref 0–1.2)
BUN SERPL-MCNC: 17 MG/DL (ref 6–20)
BUN/CREAT SERPL: 18.9 (ref 7–25)
CALCIUM SPEC-SCNC: 9.6 MG/DL (ref 8.6–10.5)
CHLORIDE SERPL-SCNC: 105 MMOL/L (ref 98–107)
CHOLEST SERPL-MCNC: 199 MG/DL (ref 0–200)
CO2 SERPL-SCNC: 24.7 MMOL/L (ref 22–29)
CREAT SERPL-MCNC: 0.9 MG/DL (ref 0.57–1)
DEPRECATED RDW RBC AUTO: 40.1 FL (ref 37–54)
EOSINOPHIL # BLD AUTO: 0.28 10*3/MM3 (ref 0–0.4)
EOSINOPHIL NFR BLD AUTO: 3.3 % (ref 0.3–6.2)
ERYTHROCYTE [DISTWIDTH] IN BLOOD BY AUTOMATED COUNT: 12.8 % (ref 12.3–15.4)
GFR SERPL CREATININE-BSD FRML MDRD: 76 ML/MIN/1.73
GLOBULIN UR ELPH-MCNC: 3.2 GM/DL
GLUCOSE SERPL-MCNC: 79 MG/DL (ref 65–99)
HBA1C MFR BLD: 5.67 % (ref 4.8–5.6)
HCT VFR BLD AUTO: 40.4 % (ref 34–46.6)
HDLC SERPL-MCNC: 55 MG/DL (ref 40–60)
HGB BLD-MCNC: 13.3 G/DL (ref 12–15.9)
IMM GRANULOCYTES # BLD AUTO: 0.04 10*3/MM3 (ref 0–0.05)
IMM GRANULOCYTES NFR BLD AUTO: 0.5 % (ref 0–0.5)
LDLC SERPL CALC-MCNC: 133 MG/DL (ref 0–100)
LDLC/HDLC SERPL: 2.4 {RATIO}
LYMPHOCYTES # BLD AUTO: 2.21 10*3/MM3 (ref 0.7–3.1)
LYMPHOCYTES NFR BLD AUTO: 26 % (ref 19.6–45.3)
MCH RBC QN AUTO: 28.5 PG (ref 26.6–33)
MCHC RBC AUTO-ENTMCNC: 32.9 G/DL (ref 31.5–35.7)
MCV RBC AUTO: 86.7 FL (ref 79–97)
MONOCYTES # BLD AUTO: 0.69 10*3/MM3 (ref 0.1–0.9)
MONOCYTES NFR BLD AUTO: 8.1 % (ref 5–12)
NEUTROPHILS NFR BLD AUTO: 5.23 10*3/MM3 (ref 1.7–7)
NEUTROPHILS NFR BLD AUTO: 61.4 % (ref 42.7–76)
NRBC BLD AUTO-RTO: 0 /100 WBC (ref 0–0.2)
PLATELET # BLD AUTO: 313 10*3/MM3 (ref 140–450)
PMV BLD AUTO: 9.7 FL (ref 6–12)
POTASSIUM SERPL-SCNC: 4.2 MMOL/L (ref 3.5–5.2)
PROT SERPL-MCNC: 7.4 G/DL (ref 6–8.5)
RBC # BLD AUTO: 4.66 10*6/MM3 (ref 3.77–5.28)
SODIUM SERPL-SCNC: 139 MMOL/L (ref 136–145)
TRIGL SERPL-MCNC: 59 MG/DL (ref 0–150)
TSH SERPL DL<=0.05 MIU/L-ACNC: 1.11 UIU/ML (ref 0.27–4.2)
VLDLC SERPL-MCNC: 11 MG/DL (ref 5–40)
WBC # BLD AUTO: 8.51 10*3/MM3 (ref 3.4–10.8)

## 2021-09-30 PROCEDURE — 36415 COLL VENOUS BLD VENIPUNCTURE: CPT

## 2021-09-30 PROCEDURE — 83036 HEMOGLOBIN GLYCOSYLATED A1C: CPT

## 2021-09-30 PROCEDURE — 85025 COMPLETE CBC W/AUTO DIFF WBC: CPT

## 2021-09-30 PROCEDURE — 80053 COMPREHEN METABOLIC PANEL: CPT

## 2021-09-30 PROCEDURE — 84443 ASSAY THYROID STIM HORMONE: CPT

## 2021-09-30 PROCEDURE — 80061 LIPID PANEL: CPT

## 2021-10-19 ENCOUNTER — OFFICE VISIT (OUTPATIENT)
Dept: FAMILY MEDICINE CLINIC | Facility: CLINIC | Age: 26
End: 2021-10-19

## 2021-10-19 VITALS
HEART RATE: 93 BPM | HEIGHT: 66 IN | SYSTOLIC BLOOD PRESSURE: 127 MMHG | DIASTOLIC BLOOD PRESSURE: 79 MMHG | WEIGHT: 251.9 LBS | OXYGEN SATURATION: 97 % | TEMPERATURE: 97.8 F | RESPIRATION RATE: 16 BRPM | BODY MASS INDEX: 40.48 KG/M2

## 2021-10-19 DIAGNOSIS — E66.09 OBESITY DUE TO EXCESS CALORIES WITH SERIOUS COMORBIDITY, UNSPECIFIED CLASSIFICATION: ICD-10-CM

## 2021-10-19 DIAGNOSIS — R00.2 PALPITATIONS: ICD-10-CM

## 2021-10-19 DIAGNOSIS — R73.03 BORDERLINE DIABETES: Primary | ICD-10-CM

## 2021-10-19 DIAGNOSIS — F33.41 RECURRENT MAJOR DEPRESSIVE DISORDER, IN PARTIAL REMISSION (HCC): ICD-10-CM

## 2021-10-19 DIAGNOSIS — F41.1 GENERALIZED ANXIETY DISORDER: ICD-10-CM

## 2021-10-19 DIAGNOSIS — F10.21 ALCOHOLISM IN REMISSION (HCC): ICD-10-CM

## 2021-10-19 DIAGNOSIS — Z23 NEED FOR INFLUENZA VACCINATION: ICD-10-CM

## 2021-10-19 PROBLEM — J30.9 ALLERGIC RHINITIS: Status: ACTIVE | Noted: 2021-10-19

## 2021-10-19 PROBLEM — F32.9 MAJOR DEPRESSION: Status: ACTIVE | Noted: 2021-02-05

## 2021-10-19 PROBLEM — E66.9 OBESITY: Status: ACTIVE | Noted: 2021-02-05

## 2021-10-19 PROBLEM — J45.909 ASTHMA: Status: ACTIVE | Noted: 2021-10-19

## 2021-10-19 PROBLEM — F41.9 ANXIETY DISORDER: Status: ACTIVE | Noted: 2021-01-05

## 2021-10-19 LAB — GLUCOSE BLDC GLUCOMTR-MCNC: 99 MG/DL (ref 70–130)

## 2021-10-19 PROCEDURE — 90471 IMMUNIZATION ADMIN: CPT | Performed by: NURSE PRACTITIONER

## 2021-10-19 PROCEDURE — 82962 GLUCOSE BLOOD TEST: CPT | Performed by: NURSE PRACTITIONER

## 2021-10-19 PROCEDURE — 90686 IIV4 VACC NO PRSV 0.5 ML IM: CPT | Performed by: NURSE PRACTITIONER

## 2021-10-19 PROCEDURE — 99214 OFFICE O/P EST MOD 30 MIN: CPT | Performed by: NURSE PRACTITIONER

## 2021-10-19 NOTE — PROGRESS NOTES
Chief Complaint  Follow-up ([facility] 3 weeks ago)    Subjective      History of Present Illness  Zhane Traylor presents to [facility]     She complains that after eating she feels a more rapid heart rate and sometimes pressure in her chest, she usually eats fries for lunch, today she had fries and grilled nuggets. She feels off right now. She has had water and sprite to drink today. She doesn't feel like it's her meds, it's always after she eats. She has had borderline diabetes. Caffeine increases her symptoms as well.  She has family history of diabetes on both sides.     cxr was normal at last visit when she was having her symptoms.     Anxiety, she is on meds and they aren't completely alleviating her symptoms. They are similar to the episodes she has been experiencing.  She is still doing counseling and seeing a psych NP at [facility].     She says she has had weight gain.     Symptoms have been worse since she stopped the alcohol.           Past Medical History:   • Alcohol abuse   • Alcoholism in remission ([facility])   • Allergic rhinitis   • Anxiety   • Anxiety disorder   • Asthma   • Borderline diabetes   • Broken bones   • Bronchitis   • Callus   • Depression   • Fracture of left foot   • H/O psychiatric care   • HBP (high blood pressure)   • Ingrown toenail   • Major depression   • Obesity   • Onychomycosis   • Sinus trouble   • SOB (shortness of breath)       Allergies  Patient has no known allergies.    Past Surgical History:   • DILATATION AND CURETTAGE   • WISDOM TOOTH EXTRACTION       Social History     Tobacco Use   • Smoking status: Former Smoker     Packs/day: 0.50     Years: 6.00     Pack years: 3.00     Types: Cigarettes     Start date: 10/19/2010     Quit date: 10/19/2016     Years since quittin.0   • Smokeless tobacco: Never Used   Vaping Use   • Vaping Use: Never used   Substance Use Topics   • Alcohol use: Never    • Drug use: Never       Family History   Problem Relation Age of Onset   • Heart disease Other    • Diabetes Other         Health Maintenance Due   Topic Date Due   • ANNUAL PHYSICAL  Never done   • Pneumococcal Vaccine 0-64 (1 of 2 - PPSV23) Never done   • HPV VACCINES (1 - 2-dose series) Never done   • TDAP/TD VACCINES (2 - Td or Tdap) 07/03/2017   • HEPATITIS C SCREENING  Never done   • PAP SMEAR  Never done          Current Outpatient Medications:   •  Advair Diskus 250-50 MCG/DOSE DISKUS, Inhale 1 puff 2 (Two) Times a Day. For asthma. Rinse mouth after use, Disp: 60 each, Rfl: 0  •  cetirizine (ZyrTEC Allergy) 10 MG tablet, Zyrtec 10 mg oral tablet take 1 tablet (10 mg) by oral route once daily   Active, Disp: , Rfl:   •  clindamycin (CLEOCIN T) 1 % lotion, Apply a thin layer daily to Chest and Back., Disp: 60 mL, Rfl: 10  •  clorazepate (TRANXENE) 7.5 MG tablet, Tkae 1 tablet by mouth twice daily as needed for anxiety, Disp: 60 tablet, Rfl: 0  •  lamoTRIgine (LaMICtal) 25 MG tablet, Take 2 tablets by mouth 2 (two) times a day., Disp: 120 tablet, Rfl: 2  •  Latuda 40 MG tablet tablet, Take 1 tablet by mouth every night at bedtime., Disp: 30 tablet, Rfl: 2  •  levalbuterol (XOPENEX HFA) 45 MCG/ACT inhaler, Inhale 1-2 puffs Every 4 (Four) Hours As Needed for Wheezing or Shortness of Air., Disp: 15 g, Rfl: 1  •  lurasidone (Latuda) 40 MG tablet tablet, Take 1 oral tablet at bedtime, Disp: 30 tablet, Rfl: 2  •  tretinoin (RETIN-A) 0.025 % cream, Apply to face at bedtime, Disp: 20 g, Rfl: 11  •  Blood Glucose Monitoring Suppl (FreeStyle Freedom Lite) w/Device kit, use as instructed to check sugar after meals, Disp: 1 each, Rfl: 0  •  clorazepate (TRANXENE) 15 MG tablet, Take 1 tablet by mouth 2 times a day, Disp: 60 tablet, Rfl: 0  •  glucose blood (FREESTYLE LITE) test strip, Use as instructed to check sugar after meals, Disp: 100 each, Rfl: 0  •  Lancets (freestyle) lancets, Use as instructed to check sugar  after meals, Disp: 100 each, Rfl: 0    Immunization History   Administered Date(s) Administered   • COVID-19 (PFIZER) 08/03/2021, 08/24/2021   • FluLaval/Fluarix/Fluzone >6 10/19/2021       Objective     Vitals:    10/19/21 1618   BP: 127/79   Pulse: 93   Resp: 16   Temp: 97.8 °F (36.6 °C)   SpO2: 97%     Body mass index is 40.66 kg/m².     Review of Systems    Physical Exam  Vitals reviewed.   Constitutional:       Appearance: Normal appearance. She is well-developed.   HENT:      Mouth/Throat:      Pharynx: No oropharyngeal exudate.   Cardiovascular:      Rate and Rhythm: Normal rate and regular rhythm.      Heart sounds: Normal heart sounds. No murmur heard.      Pulmonary:      Effort: Pulmonary effort is normal.      Breath sounds: Normal breath sounds.   Neurological:      Mental Status: She is alert and oriented to person, place, and time.      Cranial Nerves: No cranial nerve deficit.      Motor: No weakness.   Psychiatric:         Mood and Affect: Mood and affect normal.             Result Review :    The following data was reviewed by: CLEO Bright on 10/19/2021:       Depression: Not at risk   • PHQ-2 Score: 0       Common labs    Common Labsle 11/9/20 11/9/20 2/5/21 2/5/21 9/30/21 9/30/21 9/30/21 9/30/21    1857 1857 1413 1413 1237 1237 1237 1237   Glucose        79   Glucose 162 (A)   95       BUN 17   21    17   Creatinine 0.83   0.72    0.90   eGFR Non African Am        76   Sodium 135   137    139   Potassium 4.6   4.4    4.2   Chloride 101   101    105   Calcium 9.3   9.2    9.6   Albumin 4.2   4.1    4.20   Total Bilirubin <0.15 (A)   <0.15 (A)    0.3   Alkaline Phosphatase 128 (A)   118 (A)    86   AST (SGOT) 88 (A)   18    15   ALT (SGPT) 157 (A)   25    13   WBC 6.97    8.51      Hemoglobin 14.0    13.3      Hematocrit 44.9    40.4      Platelets 271    313      Total Cholesterol       199    Total Cholesterol 189          Triglycerides 192 (A)      59    HDL Cholesterol 51       55    LDL Cholesterol  100      133 (A)    Hemoglobin A1C  6.0 (A) 5.6   5.67 (A)     (A) Abnormal value       Comments are available for some flowsheets but are not being displayed.                           Assessment and Plan     Diagnoses and all orders for this visit:    1. Borderline diabetes (Primary)  Comments:  cutting back on carbs  Orders:  -     POCT Glucose  -     POCT urinalysis dipstick, automated    2. Obesity due to excess calories with serious comorbidity, unspecified classification  Comments:  work on cutting out carbs    3. Alcoholism in remission (HCC)  Comments:  praised on efforts of cessation    4. Generalized anxiety disorder    5. Recurrent major depressive disorder, in partial remission (HCC)  Comments:  continue therapy and psych care, currently stable    6. Need for influenza vaccination  -     FluLaval/Fluarix/Fluzone >6 Months ([id number])    7. Palpitations  Comments:  arrange for holter  Orders:  -     Holter Monitor - 48 Hour; Future            Follow Up     Return in about 3 months (around 1/19/2022).     She is going to cut back on carbs and see if it helps the feelings she has after she eats.     Patient was given instructions and counseling regarding her condition or for health maintenance advice. Please see specific information pulled into the AVS if appropriate.     Zhane Traylor  reports that she quit smoking about 5 years ago. Her smoking use included cigarettes. She started smoking about 11 years ago. She has a 3.00 pack-year smoking history. She has never used smokeless tobacco.           Avelina Mims, APRN

## 2021-11-02 ENCOUNTER — HOSPITAL ENCOUNTER (OUTPATIENT)
Dept: CARDIOLOGY | Facility: HOSPITAL | Age: 26
Discharge: HOME | End: 2021-11-02
Admitting: NURSE PRACTITIONER

## 2021-11-02 DIAGNOSIS — R00.2 PALPITATIONS: ICD-10-CM

## 2021-11-02 PROCEDURE — 93225 XTRNL ECG REC<48 HRS REC: CPT

## 2021-11-09 LAB
MAXIMAL PREDICTED HEART RATE: 195 BPM
STRESS TARGET HR: 166 BPM

## 2022-01-18 ENCOUNTER — OFFICE VISIT (OUTPATIENT)
Dept: ORTHOPEDIC SURGERY | Facility: CLINIC | Age: 27
End: 2022-01-18

## 2022-01-18 VITALS — OXYGEN SATURATION: 99 % | WEIGHT: 265.6 LBS | BODY MASS INDEX: 42.68 KG/M2 | HEIGHT: 66 IN | HEART RATE: 98 BPM

## 2022-01-18 DIAGNOSIS — M25.562 LEFT KNEE PAIN, UNSPECIFIED CHRONICITY: Primary | ICD-10-CM

## 2022-01-18 DIAGNOSIS — S89.92XA INJURY OF LEFT KNEE, INITIAL ENCOUNTER: ICD-10-CM

## 2022-01-18 PROCEDURE — 99203 OFFICE O/P NEW LOW 30 MIN: CPT | Performed by: ORTHOPAEDIC SURGERY

## 2022-01-18 RX ORDER — ETONOGESTREL 68 MG/1
68 IMPLANT SUBCUTANEOUS
COMMUNITY

## 2022-01-18 NOTE — PROGRESS NOTES
"Chief Complaint  Pain of the Left Knee     Subjective      Zhane Traylor presents to [facility] for evaluation of the left knee. The patient reports she twisted her knee when outside wearing boots and turned wrong about 1 week. She reports she felt a pop and felt pain. She does report swelling developed after the pop. She reports pain to the medial knee. She states pain is worse with stairs. She is a .     No Known Allergies     Social History     Socioeconomic History   • Marital status: Single   Tobacco Use   • Smoking status: Former Smoker     Packs/day: 0.50     Years: 6.00     Pack years: 3.00     Types: Cigarettes     Start date: 10/19/2010     Quit date: 10/19/2016     Years since quittin.2   • Smokeless tobacco: Never Used   Vaping Use   • Vaping Use: Never used   Substance and Sexual Activity   • Alcohol use: Never   • Drug use: Never   • Sexual activity: Defer        Review of Systems     Objective   Vital Signs:   Pulse 98   Ht 167.6 cm (66\")   Wt 120 kg (265 lb 9.6 oz)   SpO2 99%   BMI 42.87 kg/m²       Physical Exam  Constitutional:       Appearance: Normal appearance. The patient is well-developed and normal weight.   HENT:      Head: Normocephalic.      Right Ear: Hearing and external ear normal.      Left Ear: Hearing and external ear normal.      Nose: Nose normal.   Eyes:      Conjunctiva/sclera: Conjunctivae normal.   Cardiovascular:      Rate and Rhythm: Normal rate.   Pulmonary:      Effort: Pulmonary effort is normal.      Breath sounds: No wheezing or rales.   Abdominal:      Palpations: Abdomen is soft.      Tenderness: There is no abdominal tenderness.   Musculoskeletal:      Cervical back: Normal range of motion.   Skin:     Findings: No rash.   Neurological:      Mental Status: The patient is alert and oriented to person, place, and time.   Psychiatric:         Mood and Affect: Mood and affect normal.         Judgment: Judgment normal. "       Ortho Exam      Left knee- ROM 0-95 degrees. Tender to the medial joint line. Stable to varus/valgus stress. Stable to anterior/posterior drawer. No lateral joint line tenderness. Small effusion. Pain with Dinesh's. Negative Lachman's.     Procedures    X-Ray Report:  Left knee(s) X-Ray  Indication: Evaluation of left knee pain  AP, Lateral, Standing and Sunrise view(s)  Findings: no acute fracture, dislocation or subluxation. Mild medial joint space narrowing. Small effusion.   Prior studies available for comparison: no         Imaging Results (Most Recent)     Procedure Component Value Units Date/Time    XR Knee 3 View Left [[id number]] Resulted: 01/18/22 0825     Updated: 01/18/22 0828           Result Review :       No results found.           Assessment and Plan     DX: Left knee injury    Discussed the treatment plan with the patient.  Plan for MRI of the left knee to evaluate for a meniscus tear. Plan to avoid strenuous activity.     Call or return if worsening symptoms.    Follow Up     After MRI      Patient was given instructions and counseling regarding her condition or for health maintenance advice. Please see specific information pulled into the AVS if appropriate.     Scribed for Troy Hsu MD by Luisa Wesley.  01/18/22   08:36 EST    I have personally performed the services described in this document as scribed by the above individual and it is both accurate and complete. Troy Hsu MD 01/19/22

## 2022-01-25 ENCOUNTER — LAB (OUTPATIENT)
Dept: LAB | Facility: HOSPITAL | Age: 27
End: 2022-01-25

## 2022-01-25 ENCOUNTER — TRANSCRIBE ORDERS (OUTPATIENT)
Dept: LAB | Facility: HOSPITAL | Age: 27
End: 2022-01-25

## 2022-01-25 DIAGNOSIS — Z01.818 PRE-OP TESTING: ICD-10-CM

## 2022-01-25 DIAGNOSIS — Z01.818 PRE-OP TESTING: Primary | ICD-10-CM

## 2022-01-25 PROCEDURE — C9803 HOPD COVID-19 SPEC COLLECT: HCPCS

## 2022-01-25 PROCEDURE — U0004 COV-19 TEST NON-CDC HGH THRU: HCPCS

## 2022-01-26 LAB — SARS-COV-2 RNA PNL SPEC NAA+PROBE: DETECTED

## 2022-02-08 ENCOUNTER — HOSPITAL ENCOUNTER (OUTPATIENT)
Dept: MRI IMAGING | Facility: HOSPITAL | Age: 27
Discharge: HOME | End: 2022-02-08
Admitting: ORTHOPAEDIC SURGERY

## 2022-02-08 DIAGNOSIS — S89.92XA INJURY OF LEFT KNEE, INITIAL ENCOUNTER: ICD-10-CM

## 2022-02-08 DIAGNOSIS — M25.562 LEFT KNEE PAIN, UNSPECIFIED CHRONICITY: ICD-10-CM

## 2022-02-08 PROCEDURE — 73721 MRI JNT OF LWR EXTRE W/O DYE: CPT

## 2022-02-15 ENCOUNTER — OFFICE VISIT (OUTPATIENT)
Dept: ORTHOPEDIC SURGERY | Facility: CLINIC | Age: 27
End: 2022-02-15

## 2022-02-15 ENCOUNTER — PREP FOR SURGERY (OUTPATIENT)
Dept: OTHER | Facility: HOSPITAL | Age: 27
End: 2022-02-15

## 2022-02-15 VITALS — WEIGHT: 250 LBS | BODY MASS INDEX: 40.18 KG/M2 | HEIGHT: 66 IN

## 2022-02-15 DIAGNOSIS — S83.242A ACUTE MEDIAL MENISCUS TEAR OF LEFT KNEE, INITIAL ENCOUNTER: ICD-10-CM

## 2022-02-15 DIAGNOSIS — S83.282A TEAR OF LATERAL MENISCUS OF LEFT KNEE, UNSPECIFIED TEAR TYPE, UNSPECIFIED WHETHER OLD OR CURRENT TEAR, INITIAL ENCOUNTER: ICD-10-CM

## 2022-02-15 DIAGNOSIS — S83.512A RUPTURE OF ANTERIOR CRUCIATE LIGAMENT OF LEFT KNEE, INITIAL ENCOUNTER: Primary | ICD-10-CM

## 2022-02-15 DIAGNOSIS — S82.142A CLOSED FRACTURE OF LEFT TIBIAL PLATEAU, INITIAL ENCOUNTER: ICD-10-CM

## 2022-02-15 DIAGNOSIS — S83.412A SPRAIN OF MEDIAL COLLATERAL LIGAMENT OF LEFT KNEE, INITIAL ENCOUNTER: ICD-10-CM

## 2022-02-15 PROBLEM — S89.92XA ACUTE INJURY OF LEFT ANTERIOR CRUCIATE LIGAMENT: Status: ACTIVE | Noted: 2022-02-15

## 2022-02-15 PROCEDURE — 99214 OFFICE O/P EST MOD 30 MIN: CPT | Performed by: ORTHOPAEDIC SURGERY

## 2022-02-15 RX ORDER — CEFAZOLIN SODIUM 2 G/100ML
2 INJECTION, SOLUTION INTRAVENOUS ONCE
Status: CANCELLED | OUTPATIENT
Start: 2022-02-15 | End: 2022-02-15

## 2022-02-15 NOTE — H&P (VIEW-ONLY)
"Chief Complaint  Pain of the Left Knee     Subjective      Zhane Traylor presents to [facility] for follow up evaluation of the left knee. The patient reports she twisted her knee when outside wearing boots and turned wrong. She reports she felt a pop and felt pain. She does report swelling developed after the pop. She reports pain to the medial knee. She states pain is worse with stairs. She is a . She recently had an MRI and is here today for those results.        No Known Allergies     Social History     Socioeconomic History   • Marital status: Single   Tobacco Use   • Smoking status: Former Smoker     Packs/day: 0.50     Years: 6.00     Pack years: 3.00     Types: Cigarettes     Start date: 10/19/2010     Quit date: 10/19/2016     Years since quittin.3   • Smokeless tobacco: Never Used   Vaping Use   • Vaping Use: Never used   Substance and Sexual Activity   • Alcohol use: Never   • Drug use: Never   • Sexual activity: Defer        Review of Systems     Objective   Vital Signs:   Ht 167.6 cm (66\")   Wt 113 kg (250 lb)   BMI 40.35 kg/m²       Physical Exam  Constitutional:       Appearance: Normal appearance. The patient is well-developed and normal weight.   HENT:      Head: Normocephalic.      Right Ear: Hearing and external ear normal.      Left Ear: Hearing and external ear normal.      Nose: Nose normal.   Eyes:      Conjunctiva/sclera: Conjunctivae normal.   Cardiovascular:      Rate and Rhythm: Normal rate.   Pulmonary:      Effort: Pulmonary effort is normal.      Breath sounds: No wheezing or rales.   Abdominal:      Palpations: Abdomen is soft.      Tenderness: There is no abdominal tenderness.   Musculoskeletal:      Cervical back: Normal range of motion.   Skin:     Findings: No rash.   Neurological:      Mental Status: The patient is alert and oriented to person, place, and time.   Psychiatric:         Mood and Affect: Mood and affect normal.         " Judgment: Judgment normal.       Ortho Exam      Left knee- tender to the anterior medial knee. Neurovascularly intact. ROM 0-120 degrees. Positive Lachman's. Positive Dinesh's. Stable to varus/valgus stress. Positive EHL, FHL, GS and TA. Sensation intact to all 5 nerves of the foot. Positive pulses. Good strength to hamstrings, quadriceps, dorsiflexors, and plantar flexors.  Positive anterior drawer    Procedures      Imaging Results (Most Recent)     None           Result Review :       XR Knee 3 View Left    Result Date: 1/18/2022  Narrative: X-Ray Report: Left knee(s) X-Ray Indication: Evaluation of left knee pain AP, Lateral, Standing and Sunrise view(s) Findings: no acute fracture, dislocation or subluxation. Mild medial joint space narrowing. Small effusion. Prior studies available for comparison: no     MRI Knee Left Without Contrast    Result Date: 2/8/2022  Narrative: PROCEDURE: MRI KNEE LEFT  WO CONTRAST  COMPARISON: [facility] , CR, XR KNEE 3 VW LEFT, 1/18/2022, 8:33.  INDICATIONS: left knee mmt/ injury      TECHNIQUE: A complete multi-planar MRI was performed.   FINDINGS:  There is a tiny , nondisplaced intra-articular fracture involving the posteromedial corner of the tibial plateau.  There are osseous contusions noted in the lateral femoral condyle as well as a throughout the tibial plateau.  The proximal anterior cruciate ligament is torn.  The posterior cruciate ligament is intact.  There is apparent intermediate signal abnormality in the medial and lateral meniscal bodies suspected to represent small tears.  Fluid surrounds the medial collateral ligament which is intact consistent with a grade 1 sprain.  The lateral collateral ligament complex, patellar retinacula and extensor mechanism appear intact.  A moderate knee joint effusion is noted.  Moderate soft tissue swelling is noted around the knee joint.  Cartilage in the joint is intact.  No loose body is seen.  Fluid is noted extending  inferiorly from a popliteal cyst consistent with leak or rupture.      Impression:   1. Complete tear of the proximal ACL 2. Tiny nondisplaced intra-articular fracture involving the posteromedial tibial plateau 3. Other associated contusions noted in the lateral femoral condyle and tibial plateau 4. Moderate knee joint effusion 5. Tears of the medial and lateral meniscal bodies 6. Grade 1 MCL sprain 7. Mild to moderate soft tissue edema around the knee joint 8. Evidence of a perforated/leaking popliteal cyst     Afshin Rose M.D.       Electronically Signed and Approved By: Afshin Rose M.D. on 2/08/2022 at 16:18                      Assessment and Plan     DX: Left tibia plateau fracture, ACL tear, medial and lateral meniscus tear, MCL sprain    Discussed the treatment options with the patient, operative vs non-operative. Discussed the risks and benefits of a Left knee arthroscopic ACL reconstruction, partial lateral and medial meniscectomy and chondroplasty. The patient expressed understanding and wished to proceed.     Discussed surgery., Risks/benefits discussed with patient including, but not limited to: infection, bleeding, neurovascular damage, malunion, nonunion, aesthetic deformity, need for further surgery, and death., Discussed with patient the implant type being used during surgery and patient understands and desires to proceed., Surgery pamphlet given. and Call or return if worsening symptoms.    Follow Up     2 weeks postoperatively.        Patient was given instructions and counseling regarding her condition or for health maintenance advice. Please see specific information pulled into the AVS if appropriate.     Scribed for Troy Hsu MD by Luisa Wesley.  02/15/22   08:12 EST  I have personally performed the services described in this document as scribed by the above individual and it is both accurate and complete. Troy Hsu MD 02/15/22

## 2022-02-15 NOTE — PRE-PROCEDURE INSTRUCTIONS
TAKE ZYRTEC, LAMICTAL AND CLORAZEPATE WITH A SIP OF WATER THE MORNING OF PROCEDURE. BRING INHALERS.

## 2022-02-15 NOTE — PROGRESS NOTES
"Chief Complaint  Pain of the Left Knee     Subjective      Zhane Traylor presents to [facility] for follow up evaluation of the left knee. The patient reports she twisted her knee when outside wearing boots and turned wrong. She reports she felt a pop and felt pain. She does report swelling developed after the pop. She reports pain to the medial knee. She states pain is worse with stairs. She is a . She recently had an MRI and is here today for those results.        No Known Allergies     Social History     Socioeconomic History   • Marital status: Single   Tobacco Use   • Smoking status: Former Smoker     Packs/day: 0.50     Years: 6.00     Pack years: 3.00     Types: Cigarettes     Start date: 10/19/2010     Quit date: 10/19/2016     Years since quittin.3   • Smokeless tobacco: Never Used   Vaping Use   • Vaping Use: Never used   Substance and Sexual Activity   • Alcohol use: Never   • Drug use: Never   • Sexual activity: Defer        Review of Systems     Objective   Vital Signs:   Ht 167.6 cm (66\")   Wt 113 kg (250 lb)   BMI 40.35 kg/m²       Physical Exam  Constitutional:       Appearance: Normal appearance. The patient is well-developed and normal weight.   HENT:      Head: Normocephalic.      Right Ear: Hearing and external ear normal.      Left Ear: Hearing and external ear normal.      Nose: Nose normal.   Eyes:      Conjunctiva/sclera: Conjunctivae normal.   Cardiovascular:      Rate and Rhythm: Normal rate.   Pulmonary:      Effort: Pulmonary effort is normal.      Breath sounds: No wheezing or rales.   Abdominal:      Palpations: Abdomen is soft.      Tenderness: There is no abdominal tenderness.   Musculoskeletal:      Cervical back: Normal range of motion.   Skin:     Findings: No rash.   Neurological:      Mental Status: The patient is alert and oriented to person, place, and time.   Psychiatric:         Mood and Affect: Mood and affect normal.         " Judgment: Judgment normal.       Ortho Exam      Left knee- tender to the anterior medial knee. Neurovascularly intact. ROM 0-120 degrees. Positive Lachman's. Positive Dinesh's. Stable to varus/valgus stress. Positive EHL, FHL, GS and TA. Sensation intact to all 5 nerves of the foot. Positive pulses. Good strength to hamstrings, quadriceps, dorsiflexors, and plantar flexors.  Positive anterior drawer    Procedures      Imaging Results (Most Recent)     None           Result Review :       XR Knee 3 View Left    Result Date: 1/18/2022  Narrative: X-Ray Report: Left knee(s) X-Ray Indication: Evaluation of left knee pain AP, Lateral, Standing and Sunrise view(s) Findings: no acute fracture, dislocation or subluxation. Mild medial joint space narrowing. Small effusion. Prior studies available for comparison: no     MRI Knee Left Without Contrast    Result Date: 2/8/2022  Narrative: PROCEDURE: MRI KNEE LEFT  WO CONTRAST  COMPARISON: [facility] , CR, XR KNEE 3 VW LEFT, 1/18/2022, 8:33.  INDICATIONS: left knee mmt/ injury      TECHNIQUE: A complete multi-planar MRI was performed.   FINDINGS:  There is a tiny , nondisplaced intra-articular fracture involving the posteromedial corner of the tibial plateau.  There are osseous contusions noted in the lateral femoral condyle as well as a throughout the tibial plateau.  The proximal anterior cruciate ligament is torn.  The posterior cruciate ligament is intact.  There is apparent intermediate signal abnormality in the medial and lateral meniscal bodies suspected to represent small tears.  Fluid surrounds the medial collateral ligament which is intact consistent with a grade 1 sprain.  The lateral collateral ligament complex, patellar retinacula and extensor mechanism appear intact.  A moderate knee joint effusion is noted.  Moderate soft tissue swelling is noted around the knee joint.  Cartilage in the joint is intact.  No loose body is seen.  Fluid is noted extending  inferiorly from a popliteal cyst consistent with leak or rupture.      Impression:   1. Complete tear of the proximal ACL 2. Tiny nondisplaced intra-articular fracture involving the posteromedial tibial plateau 3. Other associated contusions noted in the lateral femoral condyle and tibial plateau 4. Moderate knee joint effusion 5. Tears of the medial and lateral meniscal bodies 6. Grade 1 MCL sprain 7. Mild to moderate soft tissue edema around the knee joint 8. Evidence of a perforated/leaking popliteal cyst     Afshin Rose M.D.       Electronically Signed and Approved By: Afshin Rose M.D. on 2/08/2022 at 16:18                      Assessment and Plan     DX: Left tibia plateau fracture, ACL tear, medial and lateral meniscus tear, MCL sprain    Discussed the treatment options with the patient, operative vs non-operative. Discussed the risks and benefits of a Left knee arthroscopic ACL reconstruction, partial lateral and medial meniscectomy and chondroplasty. The patient expressed understanding and wished to proceed.     Discussed surgery., Risks/benefits discussed with patient including, but not limited to: infection, bleeding, neurovascular damage, malunion, nonunion, aesthetic deformity, need for further surgery, and death., Discussed with patient the implant type being used during surgery and patient understands and desires to proceed., Surgery pamphlet given. and Call or return if worsening symptoms.    Follow Up     2 weeks postoperatively.        Patient was given instructions and counseling regarding her condition or for health maintenance advice. Please see specific information pulled into the AVS if appropriate.     Scribed for Troy Hsu MD by Luisa Wesley.  02/15/22   08:12 EST  I have personally performed the services described in this document as scribed by the above individual and it is both accurate and complete. Troy Hsu MD 02/15/22

## 2022-02-21 ENCOUNTER — APPOINTMENT (OUTPATIENT)
Dept: GENERAL RADIOLOGY | Facility: HOSPITAL | Age: 27
End: 2022-02-21

## 2022-02-21 ENCOUNTER — HOSPITAL ENCOUNTER (OUTPATIENT)
Facility: HOSPITAL | Age: 27
Discharge: HOME | End: 2022-02-21
Attending: ORTHOPAEDIC SURGERY | Admitting: ORTHOPAEDIC SURGERY

## 2022-02-21 ENCOUNTER — ANESTHESIA EVENT (OUTPATIENT)
Dept: PERIOP | Facility: HOSPITAL | Age: 27
End: 2022-02-21

## 2022-02-21 ENCOUNTER — ANESTHESIA (OUTPATIENT)
Dept: PERIOP | Facility: HOSPITAL | Age: 27
End: 2022-02-21

## 2022-02-21 VITALS
RESPIRATION RATE: 12 BRPM | HEIGHT: 66 IN | TEMPERATURE: 97.5 F | BODY MASS INDEX: 41.81 KG/M2 | SYSTOLIC BLOOD PRESSURE: 122 MMHG | DIASTOLIC BLOOD PRESSURE: 74 MMHG | WEIGHT: 260.14 LBS | HEART RATE: 92 BPM | OXYGEN SATURATION: 95 %

## 2022-02-21 DIAGNOSIS — S83.512A RUPTURE OF ANTERIOR CRUCIATE LIGAMENT OF LEFT KNEE, INITIAL ENCOUNTER: ICD-10-CM

## 2022-02-21 LAB — B-HCG UR QL: NEGATIVE

## 2022-02-21 PROCEDURE — C1713 ANCHOR/SCREW BN/BN,TIS/BN: HCPCS | Performed by: ORTHOPAEDIC SURGERY

## 2022-02-21 PROCEDURE — 25010000002 ONDANSETRON PER 1 MG: Performed by: NURSE ANESTHETIST, CERTIFIED REGISTERED

## 2022-02-21 PROCEDURE — 81025 URINE PREGNANCY TEST: CPT | Performed by: ORTHOPAEDIC SURGERY

## 2022-02-21 PROCEDURE — 73560 X-RAY EXAM OF KNEE 1 OR 2: CPT

## 2022-02-21 PROCEDURE — 0 CEFAZOLIN IN DEXTROSE 2-4 GM/100ML-% SOLUTION: Performed by: ORTHOPAEDIC SURGERY

## 2022-02-21 PROCEDURE — 29888 ARTHRS AID ACL RPR/AGMNTJ: CPT | Performed by: ORTHOPAEDIC SURGERY

## 2022-02-21 PROCEDURE — 25010000002 FENTANYL CITRATE (PF) 50 MCG/ML SOLUTION: Performed by: NURSE ANESTHETIST, CERTIFIED REGISTERED

## 2022-02-21 PROCEDURE — 25010000002 HYDROMORPHONE 1 MG/ML SOLUTION: Performed by: NURSE ANESTHETIST, CERTIFIED REGISTERED

## 2022-02-21 PROCEDURE — 25010000002 MIDAZOLAM PER 1 MG: Performed by: ANESTHESIOLOGY

## 2022-02-21 PROCEDURE — 25010000002 PROCHLORPERAZINE 10 MG/2ML SOLUTION: Performed by: ANESTHESIOLOGY

## 2022-02-21 PROCEDURE — 25010000002 DEXAMETHASONE PER 1 MG: Performed by: NURSE ANESTHETIST, CERTIFIED REGISTERED

## 2022-02-21 PROCEDURE — 76000 FLUOROSCOPY <1 HR PHYS/QHP: CPT

## 2022-02-21 PROCEDURE — 25010000002 HYDROMORPHONE PER 4 MG: Performed by: NURSE ANESTHETIST, CERTIFIED REGISTERED

## 2022-02-21 PROCEDURE — C1889 IMPLANT/INSERT DEVICE, NOC: HCPCS | Performed by: ORTHOPAEDIC SURGERY

## 2022-02-21 PROCEDURE — 25010000002 PROPOFOL 10 MG/ML EMULSION: Performed by: NURSE ANESTHETIST, CERTIFIED REGISTERED

## 2022-02-21 PROCEDURE — 25010000002 KETOROLAC TROMETHAMINE PER 15 MG: Performed by: NURSE ANESTHETIST, CERTIFIED REGISTERED

## 2022-02-21 DEVICE — SUTR TIGERSTICK SUTR PASS NO2TW WAX 12IN: Type: IMPLANTABLE DEVICE | Site: KNEE | Status: FUNCTIONAL

## 2022-02-21 DEVICE — TNDN TIB/ANT FLEXIGRAFT FZ 7.5X225MM: Type: IMPLANTABLE DEVICE | Site: KNEE | Status: FUNCTIONAL

## 2022-02-21 DEVICE — TIGHTROPE ACL REV 1SZ FITS ALL STRL: Type: IMPLANTABLE DEVICE | Site: KNEE | Status: FUNCTIONAL

## 2022-02-21 DEVICE — SUT FIBERLOOP NO2 W/STR NDL 20IN BLU: Type: IMPLANTABLE DEVICE | Site: KNEE | Status: FUNCTIONAL

## 2022-02-21 DEVICE — SCRW INTERFER FASTTHREAD BIOCOMP 10X30MM: Type: IMPLANTABLE DEVICE | Site: KNEE | Status: FUNCTIONAL

## 2022-02-21 RX ORDER — PROPOFOL 10 MG/ML
VIAL (ML) INTRAVENOUS AS NEEDED
Status: DISCONTINUED | OUTPATIENT
Start: 2022-02-21 | End: 2022-02-21 | Stop reason: SURG

## 2022-02-21 RX ORDER — ASPIRIN 325 MG
325 TABLET, DELAYED RELEASE (ENTERIC COATED) ORAL DAILY
Qty: 14 TABLET | Refills: 0 | OUTPATIENT
Start: 2022-02-21 | End: 2022-04-15

## 2022-02-21 RX ORDER — PROMETHAZINE HYDROCHLORIDE 12.5 MG/1
25 TABLET ORAL ONCE AS NEEDED
Status: DISCONTINUED | OUTPATIENT
Start: 2022-02-21 | End: 2022-02-21

## 2022-02-21 RX ORDER — ONDANSETRON 2 MG/ML
INJECTION INTRAMUSCULAR; INTRAVENOUS AS NEEDED
Status: DISCONTINUED | OUTPATIENT
Start: 2022-02-21 | End: 2022-02-21 | Stop reason: SURG

## 2022-02-21 RX ORDER — PROMETHAZINE HYDROCHLORIDE 25 MG/1
25 SUPPOSITORY RECTAL ONCE AS NEEDED
Status: DISCONTINUED | OUTPATIENT
Start: 2022-02-21 | End: 2022-02-21

## 2022-02-21 RX ORDER — HYDROMORPHONE HCL 110MG/55ML
PATIENT CONTROLLED ANALGESIA SYRINGE INTRAVENOUS AS NEEDED
Status: DISCONTINUED | OUTPATIENT
Start: 2022-02-21 | End: 2022-02-21 | Stop reason: SURG

## 2022-02-21 RX ORDER — MIDAZOLAM HYDROCHLORIDE 1 MG/ML
4 INJECTION INTRAMUSCULAR; INTRAVENOUS ONCE
Status: COMPLETED | OUTPATIENT
Start: 2022-02-21 | End: 2022-02-21

## 2022-02-21 RX ORDER — CEFAZOLIN SODIUM 2 G/100ML
2 INJECTION, SOLUTION INTRAVENOUS ONCE
Status: COMPLETED | OUTPATIENT
Start: 2022-02-21 | End: 2022-02-21

## 2022-02-21 RX ORDER — GLYCOPYRROLATE 0.2 MG/ML
0.2 INJECTION INTRAMUSCULAR; INTRAVENOUS
Status: COMPLETED | OUTPATIENT
Start: 2022-02-21 | End: 2022-02-21

## 2022-02-21 RX ORDER — OXYCODONE AND ACETAMINOPHEN 7.5; 325 MG/1; MG/1
1-2 TABLET ORAL EVERY 4 HOURS PRN
Qty: 40 TABLET | Refills: 0 | OUTPATIENT
Start: 2022-02-21 | End: 2022-04-15

## 2022-02-21 RX ORDER — KETOROLAC TROMETHAMINE 30 MG/ML
INJECTION, SOLUTION INTRAMUSCULAR; INTRAVENOUS AS NEEDED
Status: DISCONTINUED | OUTPATIENT
Start: 2022-02-21 | End: 2022-02-21 | Stop reason: SURG

## 2022-02-21 RX ORDER — DEXAMETHASONE SODIUM PHOSPHATE 4 MG/ML
INJECTION, SOLUTION INTRA-ARTICULAR; INTRALESIONAL; INTRAMUSCULAR; INTRAVENOUS; SOFT TISSUE AS NEEDED
Status: DISCONTINUED | OUTPATIENT
Start: 2022-02-21 | End: 2022-02-21 | Stop reason: SURG

## 2022-02-21 RX ORDER — PROMETHAZINE HYDROCHLORIDE 25 MG/1
25 SUPPOSITORY RECTAL ONCE AS NEEDED
Status: DISCONTINUED | OUTPATIENT
Start: 2022-02-21 | End: 2022-02-21 | Stop reason: HOSPADM

## 2022-02-21 RX ORDER — OXYCODONE HYDROCHLORIDE 5 MG/1
5 TABLET ORAL
Status: DISCONTINUED | OUTPATIENT
Start: 2022-02-21 | End: 2022-02-21 | Stop reason: HOSPADM

## 2022-02-21 RX ORDER — ACETAMINOPHEN 500 MG
1000 TABLET ORAL ONCE
Status: COMPLETED | OUTPATIENT
Start: 2022-02-21 | End: 2022-02-21

## 2022-02-21 RX ORDER — FENTANYL CITRATE 50 UG/ML
INJECTION, SOLUTION INTRAMUSCULAR; INTRAVENOUS AS NEEDED
Status: DISCONTINUED | OUTPATIENT
Start: 2022-02-21 | End: 2022-02-21 | Stop reason: SURG

## 2022-02-21 RX ORDER — ONDANSETRON 4 MG/1
4 TABLET, ORALLY DISINTEGRATING ORAL EVERY 8 HOURS PRN
Qty: 10 TABLET | Refills: 0 | OUTPATIENT
Start: 2022-02-21 | End: 2022-04-15

## 2022-02-21 RX ORDER — MEPERIDINE HYDROCHLORIDE 25 MG/ML
12.5 INJECTION INTRAMUSCULAR; INTRAVENOUS; SUBCUTANEOUS
Status: DISCONTINUED | OUTPATIENT
Start: 2022-02-21 | End: 2022-02-21

## 2022-02-21 RX ORDER — MEPERIDINE HYDROCHLORIDE 25 MG/ML
12.5 INJECTION INTRAMUSCULAR; INTRAVENOUS; SUBCUTANEOUS
Status: DISCONTINUED | OUTPATIENT
Start: 2022-02-21 | End: 2022-02-21 | Stop reason: HOSPADM

## 2022-02-21 RX ORDER — ROCURONIUM BROMIDE 10 MG/ML
INJECTION, SOLUTION INTRAVENOUS AS NEEDED
Status: DISCONTINUED | OUTPATIENT
Start: 2022-02-21 | End: 2022-02-21 | Stop reason: SURG

## 2022-02-21 RX ORDER — ONDANSETRON 2 MG/ML
4 INJECTION INTRAMUSCULAR; INTRAVENOUS ONCE AS NEEDED
Status: DISCONTINUED | OUTPATIENT
Start: 2022-02-21 | End: 2022-02-21

## 2022-02-21 RX ORDER — LIDOCAINE HYDROCHLORIDE 20 MG/ML
INJECTION, SOLUTION INFILTRATION; PERINEURAL AS NEEDED
Status: DISCONTINUED | OUTPATIENT
Start: 2022-02-21 | End: 2022-02-21 | Stop reason: SURG

## 2022-02-21 RX ORDER — SODIUM CHLORIDE, SODIUM LACTATE, POTASSIUM CHLORIDE, CALCIUM CHLORIDE 600; 310; 30; 20 MG/100ML; MG/100ML; MG/100ML; MG/100ML
9 INJECTION, SOLUTION INTRAVENOUS CONTINUOUS PRN
Status: DISCONTINUED | OUTPATIENT
Start: 2022-02-21 | End: 2022-02-21 | Stop reason: HOSPADM

## 2022-02-21 RX ORDER — PROMETHAZINE HYDROCHLORIDE 12.5 MG/1
25 TABLET ORAL ONCE AS NEEDED
Status: DISCONTINUED | OUTPATIENT
Start: 2022-02-21 | End: 2022-02-21 | Stop reason: HOSPADM

## 2022-02-21 RX ORDER — ONDANSETRON 2 MG/ML
4 INJECTION INTRAMUSCULAR; INTRAVENOUS ONCE AS NEEDED
Status: DISCONTINUED | OUTPATIENT
Start: 2022-02-21 | End: 2022-02-21 | Stop reason: HOSPADM

## 2022-02-21 RX ORDER — PROCHLORPERAZINE EDISYLATE 5 MG/ML
5 INJECTION INTRAMUSCULAR; INTRAVENOUS ONCE
Status: COMPLETED | OUTPATIENT
Start: 2022-02-21 | End: 2022-02-21

## 2022-02-21 RX ORDER — SCOLOPAMINE TRANSDERMAL SYSTEM 1 MG/1
1 PATCH, EXTENDED RELEASE TRANSDERMAL CONTINUOUS
Status: DISCONTINUED | OUTPATIENT
Start: 2022-02-21 | End: 2022-02-21 | Stop reason: HOSPADM

## 2022-02-21 RX ORDER — MAGNESIUM HYDROXIDE 1200 MG/15ML
LIQUID ORAL AS NEEDED
Status: DISCONTINUED | OUTPATIENT
Start: 2022-02-21 | End: 2022-02-21 | Stop reason: HOSPADM

## 2022-02-21 RX ORDER — OXYCODONE HYDROCHLORIDE 5 MG/1
5 TABLET ORAL
Status: DISCONTINUED | OUTPATIENT
Start: 2022-02-21 | End: 2022-02-21

## 2022-02-21 RX ADMIN — ROCURONIUM BROMIDE 50 MG: 10 INJECTION INTRAVENOUS at 10:34

## 2022-02-21 RX ADMIN — SUGAMMADEX 200 MG: 100 INJECTION, SOLUTION INTRAVENOUS at 11:38

## 2022-02-21 RX ADMIN — HYDROMORPHONE HYDROCHLORIDE 0.5 MG: 1 INJECTION, SOLUTION INTRAMUSCULAR; INTRAVENOUS; SUBCUTANEOUS at 11:58

## 2022-02-21 RX ADMIN — GLYCOPYRROLATE 0.2 MG: 0.2 INJECTION INTRAMUSCULAR; INTRAVENOUS at 09:01

## 2022-02-21 RX ADMIN — FENTANYL CITRATE 50 MCG: 50 INJECTION, SOLUTION INTRAMUSCULAR; INTRAVENOUS at 10:34

## 2022-02-21 RX ADMIN — ACETAMINOPHEN 1000 MG: 500 TABLET ORAL at 09:02

## 2022-02-21 RX ADMIN — PROCHLORPERAZINE EDISYLATE 5 MG: 5 INJECTION INTRAMUSCULAR; INTRAVENOUS at 13:42

## 2022-02-21 RX ADMIN — MIDAZOLAM HYDROCHLORIDE 4 MG: 1 INJECTION, SOLUTION INTRAMUSCULAR; INTRAVENOUS at 09:02

## 2022-02-21 RX ADMIN — ONDANSETRON 4 MG: 2 INJECTION INTRAMUSCULAR; INTRAVENOUS at 11:58

## 2022-02-21 RX ADMIN — HYDROMORPHONE HYDROCHLORIDE 1 MG: 2 INJECTION, SOLUTION INTRAMUSCULAR; INTRAVENOUS; SUBCUTANEOUS at 11:36

## 2022-02-21 RX ADMIN — ONDANSETRON 4 MG: 2 INJECTION INTRAMUSCULAR; INTRAVENOUS at 10:40

## 2022-02-21 RX ADMIN — FENTANYL CITRATE 50 MCG: 50 INJECTION, SOLUTION INTRAMUSCULAR; INTRAVENOUS at 10:48

## 2022-02-21 RX ADMIN — SODIUM CHLORIDE, POTASSIUM CHLORIDE, SODIUM LACTATE AND CALCIUM CHLORIDE 9 ML/HR: 600; 310; 30; 20 INJECTION, SOLUTION INTRAVENOUS at 09:00

## 2022-02-21 RX ADMIN — HYDROMORPHONE HYDROCHLORIDE 1 MG: 2 INJECTION, SOLUTION INTRAMUSCULAR; INTRAVENOUS; SUBCUTANEOUS at 11:40

## 2022-02-21 RX ADMIN — SCOPALAMINE 1 PATCH: 1 PATCH, EXTENDED RELEASE TRANSDERMAL at 09:02

## 2022-02-21 RX ADMIN — HYDROMORPHONE HYDROCHLORIDE 0.5 MG: 1 INJECTION, SOLUTION INTRAMUSCULAR; INTRAVENOUS; SUBCUTANEOUS at 12:11

## 2022-02-21 RX ADMIN — KETOROLAC TROMETHAMINE 30 MG: 30 INJECTION, SOLUTION INTRAMUSCULAR; INTRAVENOUS at 11:39

## 2022-02-21 RX ADMIN — PROPOFOL 160 MG: 10 INJECTION, EMULSION INTRAVENOUS at 10:34

## 2022-02-21 RX ADMIN — DEXAMETHASONE SODIUM PHOSPHATE 4 MG: 4 INJECTION INTRA-ARTICULAR; INTRALESIONAL; INTRAMUSCULAR; INTRAVENOUS; SOFT TISSUE at 10:40

## 2022-02-21 RX ADMIN — CEFAZOLIN SODIUM 2 G: 2 INJECTION, SOLUTION INTRAVENOUS at 10:34

## 2022-02-21 RX ADMIN — LIDOCAINE HYDROCHLORIDE 100 MG: 20 INJECTION, SOLUTION INFILTRATION; PERINEURAL at 10:34

## 2022-02-21 NOTE — ANESTHESIA PREPROCEDURE EVALUATION
Anesthesia Evaluation     Patient summary reviewed and Nursing notes reviewed   history of anesthetic complications: PONV  NPO Solid Status: > 8 hours  NPO Liquid Status: > 2 hours           Airway   Mallampati: II  TM distance: >3 FB  Neck ROM: full  No difficulty expected  Dental      Pulmonary - normal exam    breath sounds clear to auscultation  (+) asthma,  Cardiovascular - negative cardio ROS and normal exam  Exercise tolerance: good (4-7 METS)    Rhythm: regular  Rate: normal        Neuro/Psych  (+) psychiatric history Anxiety,    GI/Hepatic/Renal/Endo    (+) obesity,       Musculoskeletal (-) negative ROS    Abdominal    Substance History - negative use     OB/GYN negative ob/gyn ROS         Other - negative ROS                       Anesthesia Plan    ASA 3     general   (Patient understands anesthesia not responsible for dental damage.)  intravenous induction     Anesthetic plan, all risks, benefits, and alternatives have been provided, discussed and informed consent has been obtained with: patient.    Plan discussed with CRNA.        CODE STATUS:

## 2022-02-21 NOTE — ANESTHESIA POSTPROCEDURE EVALUATION
Patient: Zhane Traylor    Procedure Summary     Date: 02/21/22 Room / Location: [facility] OR  / [facility]    Anesthesia Start: 1031 Anesthesia Stop: 1146    Procedure: LEFT KNEE ATHROSCOPIC ANTERIOR CRUCIATE LIGAMENT RECONSTRUCTION  WITH  ALLOGRAFT (Left Knee) Diagnosis:       Rupture of anterior cruciate ligament of left knee, initial encounter      (Rupture of anterior cruciate ligament of left knee, initial encounter [S83.512A])    Surgeons: Troy Hsu MD Provider: Odell Gordon MD    Anesthesia Type: general ASA Status: 3          Anesthesia Type: general    Vitals  Vitals Value Taken Time   /81 02/21/22 1235   Temp     Pulse 105 02/21/22 1238   Resp     SpO2 95 % 02/21/22 1238   Vitals shown include unvalidated device data.        Post Anesthesia Care and Evaluation    Patient location during evaluation: bedside  Patient participation: complete - patient participated  Level of consciousness: awake and alert  Pain management: adequate  Airway patency: patent  Anesthetic complications: No anesthetic complications  PONV Status: none  Cardiovascular status: acceptable  Respiratory status: acceptable  Hydration status: acceptable    Comments: An Anesthesiologist personally participated in the most demanding procedures (including induction and emergence if applicable) in the anesthesia plan, monitored the course of anesthesia administration at frequent intervals and remained physically present and available for immediate diagnosis and treatment of emergencies.       Visit Vitals  BP 64/51 (BP 1 Location: Right leg, BP Patient Position: At rest)  Pulse 112  Temp (P) 99 °F (37.2 °C)  Resp (P) 88  
 Ht 0.457 m  Wt 2.58 kg  HC 33.5 cm  SpO2 97%  BMI 12.34 kg/m2 Baby is still tachypneic into 80s with mild intercostal retractions. No hypoxia, grunting, flaring. No fever. No feeds in several hours. Baby alert and pink. CXR obtained and hazy R>L. CBC and blood cx obtained and WBC count slightly low at 8.5K, no bands. Bilirubin below light level. DDx: Sepsis, PNA, RDS, TTN, MAS, Pneumothorax 1) Will place 21% oxygen nasal cannula for comfort 2) Start ampicillin and gentamicin. 3) Change to D10W with electrolytes @100mL/kg/day 4) NPO. 5) Follow blood cx

## 2022-02-21 NOTE — OP NOTE
KNEE ANTERIOR CRUCIATE LIGAMENT RECONSTRUCTION  Procedure Report    Patient Name:  Zhane Traylor  YOB: 1995    Date of Surgery:  2/21/2022     Indications:  Patient has failed conservative treatment and wishes to undergo operative treatment. Risks and benefits of operative treatment including bleeding, infection, damage to neurovascular structures, continued pain and disability, need for additional procedures, among others. Informed consent was obtained and they wished to proceed.    Pre-op Diagnosis:   Rupture of anterior cruciate ligament of left knee, initial encounter [S83.512A]        Post-Op Diagnosis Codes:     * Rupture of anterior cruciate ligament of left knee, initial encounter [S83.512A]    Procedure/CPT® Codes:      Procedure(s):  LEFT KNEE ATHROSCOPIC ANTERIOR CRUCIATE LIGAMENT RECONSTRUCTION  WITH  ALLOGRAFT    Staff:  Surgeon(s):  Troy Hsu MD    Assistant: Trent Hicks RN    Anesthesia: General    Estimated Blood Loss: 20cc    Implants:    Implant Name Type Inv. Item Serial No.  Lot No. LRB No. Used Action   SUT FIBERLOOP NO2 W/STR NDL 20IN MARYJANE - [id number] Implant SUT FIBERLOOP NO2 W/STR NDL 20IN MARYJANE  [employer] [id number] Left 1 Implanted   SUT FIBERLOOP NO2 W/STR NDL 20IN MARYJANE - [id number] Implant SUT FIBERLOOP NO2 W/STR NDL 20IN MARYJANE  [employer] [id number] Left 1 Implanted   SUTR TIGERSTICK SUTR PASS NO2TW WAX 12IN - [id number] Implant SUTR TIGERSTICK SUTR PASS NO2TW WAX 12IN  [employer] [id number] Left 1 Implanted   TIGHTROPE ACL REV 1SZ FITS ALL STRL - [id number] Implant TIGHTROPE ACL REV 1SZ FITS ALL STRL  [employer] [id number] Left 1 Implanted   TNDN ANT TIB FLXIGRFT GRT/THAN 230X7.5MM [facility] [id number] - [id number] Implant TNDN ANT TIB FLXIGRFT GRT/THAN 230X7.5MM [facility] [id number] [facility] [id number] Left 1 Implanted   SCRW INTERFER FASTTHREAD BIOCOMP [id number] - [id number] Implant SCRW INTERFER FASTTHREAD BIOCOMP [id number]  [employer] [id number] Left 1 Implanted        Specimen:          None        Findings: ACL tear    Complications: None    Description of Procedure: Operative site was marked the preoperative holding area.  Patient received preoperative lower extremity nerve block by anesthesia.  The patient was brought the operating room and general anesthesia was applied.  The patient was placed on the OR table and all bony prominences were padded.  The operative leg was placed into an arthroscopic leg winston and a tourniquet was placed on the upper thigh.  The contralateral leg was placed in a well-leg winston.  All bony prominences were padded.  The foot of the bed was lowered.  Formal timeout was held and preoperative antibiotic was given.  The knee was examined under anesthesia and found to have positive test for ACL deficiency.     At first the tibialis anterior allograft was prepared on the back table.  It was sutured with a fiber loop suture in a running locking fashion 25 mm on each side.  The graft was then placed through a Arthrex tight rope loop and was sized to 9.5 mm graft.  It was then wrapped in a moist sponge, tension on the graft table, and stored until later use in the case.    The limb was exsanguinated and the tourniquet was inflated. At this point a lateral portal was established and the arthroscope was inserted into the joint.  A diagnostic arthroscopy was performed.  The suprapatellar pouch was normal and there was no chondral injuries.  The lateral and medial gutters were normal and no loose bodies were noted.  The medial compartment was entered and the medial meniscus was found to be intact.  There was no medial meniscus tear or chondral injury.  The lateral compartment was entered and the lateral meniscus was found to be intact as well without significant tearing..  The lateral tibiofemoral cartilage was intact.    A full-thickness ACL tear was identified.  The PCL tendon was intact.  The ACL stump was debrided with a motorized shaver.  The  arthroscope was used to view from the medial portal and the flip cutter was introduced into the joint at 105 degrees.  A small incision was made on the lateral thigh and the drill pin was introduced into the joint at the ACL origin.  A 9-1/2 mm tunnel was drilled 35 mm in length.  A FiberWire suture was then placed through the tunnel and retrieved out the lateral portal.    The arthroscope was switched back to the lateral portal and a tibial tunnel guide was placed through the medial portal.  It was inserted at the ACL footprint just posterior to the anterior horn of the lateral meniscus.  A small incision was made on the proximal medial tibia and the drill pin was placed through the tibia into the knee.  An 9.5 mm  tibial tunnel was drilled.  The passing suture was then brought down to the tibial tunnel.  The graft was passed and the TightRope button was flipped on the far cortex of the femur.  This was confirmed by fluoroscopy.  The TightRope sutures were then pulled docking the  patellar bone plug into the femoral tunnel.  The knee was cycled through range of motion and was held in extension while a nitinol guidewire was placed in the tibial tunnel and the 10 x 30 mm screw was inserted.       At this point the graft was tensioned and found to be in good position and good tension.  The tourniquet was released and bleeding was controlled with electrocautery.  The subcutaneous tissues were closed with 2-0 Vicryl and the skin with running Monocryl.  The portal incisions were closed with nylon suture.  Sterile dressings were placed and cold therapy and an ACL brace were placed at 0 to 90 degrees of flexion.    The patient awoke from anesthesia in stable condition.  There were no complications.  All counts were correct and the patient was stable to recovery.    Assistant: Trent Hicks RN  was responsible for performing the following activities: Retraction, Suction, Irrigation and Placing Dressing and their  skilled assistance was necessary for the success of this case.    Troy Hsu MD     Date: 2/21/2022  Time: 11:43 EST

## 2022-02-24 ENCOUNTER — TREATMENT (OUTPATIENT)
Dept: PHYSICAL THERAPY | Facility: CLINIC | Age: 27
End: 2022-02-24

## 2022-02-24 DIAGNOSIS — R29.898 WEAKNESS OF LEFT LOWER EXTREMITY: ICD-10-CM

## 2022-02-24 DIAGNOSIS — M25.662 DECREASED RANGE OF MOTION (ROM) OF LEFT KNEE: ICD-10-CM

## 2022-02-24 DIAGNOSIS — M25.562 LEFT KNEE PAIN, UNSPECIFIED CHRONICITY: ICD-10-CM

## 2022-02-24 DIAGNOSIS — R26.9 GAIT DISTURBANCE: ICD-10-CM

## 2022-02-24 DIAGNOSIS — S83.512A RUPTURE OF ANTERIOR CRUCIATE LIGAMENT OF LEFT KNEE, INITIAL ENCOUNTER: Primary | ICD-10-CM

## 2022-02-24 PROCEDURE — 97161 PT EVAL LOW COMPLEX 20 MIN: CPT | Performed by: PHYSICAL THERAPIST

## 2022-02-24 PROCEDURE — 97110 THERAPEUTIC EXERCISES: CPT | Performed by: PHYSICAL THERAPIST

## 2022-02-24 NOTE — PROGRESS NOTES
Physical Therapy Initial Evaluation and Plan of Care      Patient: Zhane Traylor   : 1995  Diagnosis/ICD-10 Code:  Rupture of anterior cruciate ligament of left knee, initial encounter [S83.512A]  Referring practitioner: Troy Hsu MD  Date of Initial Visit: 2022  Today's Date: 2022  Patient seen for 1 sessions           Subjective Questionnaire: LEFS: =86% limitation      Subjective Evaluation    History of Present Illness  Mechanism of injury: Patient is a 26 yr old female referred to physical therapy s/p Left ACL repair with allograft on 22.  Patient using bilateral crutches with little to weight on left LE.  Patient states compliant with home ice machine.     Pain  Current pain ratin  At worst pain ratin  Location: left knee  Quality: sharp and dull ache  Relieving factors: ice, medications and rest    Patient Goals  Patient goals for therapy: decreased pain, increased motion, increased strength and independence with ADLs/IADLs             Objective          Active Range of Motion   Left Knee   Flexion: 75 degrees   Extensor la degrees     Passive Range of Motion   Left Knee   Flexion: 90 degrees   Extension: 0 degrees     Strength/Myotome Testing     Left Knee   Quadriceps contraction: poor    Additional Strength Details  Left knee not tested secondary to orthopedic precautions/recent surgery    Left Knee Flexibility Comments:   Noted left hamstring tightness    Ambulation     Comments   Patient using bilateral crutches/left knee brace locked in extension for ambulation.  Patient demonstrating minimal to no weight bearing through left LE.          Assessment & Plan     Assessment  Impairments: abnormal gait, abnormal or restricted ROM, activity intolerance, impaired physical strength, lacks appropriate home exercise program, pain with function and weight-bearing intolerance  Functional Limitations: sleeping, walking, uncomfortable because of pain, standing,  stooping and unable to perform repetitive tasks  Assessment details: Pt presents with limitations, noted by evaluation that impede patient's ability to ambulation/functional mobility.  The skills of a therapist will be required to safely and effectively implement the following treatment plan to restore maximal level of function.    Prognosis: good    Goals  Plan Goals: 1. The patient has limited ROM of the left knee.   LTG 1: 12weeks:  The patient will demonstrate 0 to 125 degrees of ROM for the left knee in order to allow patient to normalize gait/stair negotiation.   STATUS:  New   STG 1a: 6 weeks:  The patient will demonstrate 0 to 120 degrees of ROM for the left knee.   STATUS:  New      2. The patient has limited strength of the left knee.   LTG 2: 12 weeks: The patient will demonstrate 4+/5 strength for left knee flexion and extension in order to allow patient improved joint stability.   STATUS:  New   STG 2a: 6 weeks: The patient will demonstrate 4/5 strength for left knee flexion and extension   STATUS:  New       3. The patient has gait dysfunction.   LTG 3: 6 weeks:  The patient will ambulate without assistive device, independently, for community distances with minimal limp to the left lower extremity in order to improve mobility and allow patient to perform activities such as grocery shopping with greater ease.   STATUS:  New   STG 3a: The patient will be independent in HEP.   STATUS:  New      4. The patient complains of left knee pain.   LTG 4: 12 weeks:  The patient will report a pain rating of 1/10 or better in order to improve   tolerance to activities of daily living and improve sleep quality.   STATUS:  New   STG 4a: 6 weeks:  The patient will report a pain rating of 1/10 or better.   STATUS:  New      5. Mobility: Walking/Moving Around Functional Limitation     LTG 5: 12weeks:  The patient will demonstrate 25% limitation by achieving a score of 60/80 on the Lower Extremity Functional Scale.    STATUS:  New   STG 5a: 6 weeks:  The patient will demonstrate 37% limitation by achieving a score of 50/80 on the Lower Extremity Functional Scale.     STATUS:  New     TREATMENT: Manual therapy, gait training, neuromuscular re-education, therapeutic exercise, home exercise instruction, and modalities as needed to include:  moist heat, electrical stimulation, ultrasound, and ice.       Plan  Therapy options: will be seen for skilled therapy services  Planned modality interventions: cryotherapy and TENS  Planned therapy interventions: manual therapy, soft tissue mobilization, strengthening, stretching, therapeutic activities, home exercise program, gait training, functional ROM exercises, flexibility and balance/weight-bearing training  Frequency: 2x week  Duration in weeks: 12  Treatment plan discussed with: patient      History # of Personal Factors and/or Comorbidities: LOW (0)  Examination of Body System(s): # of elements: LOW (1-2)  Clinical Presentation: STABLE   Clinical Decision Making: LOW       Visit Diagnoses:    ICD-10-CM ICD-9-CM   1. Rupture of anterior cruciate ligament of left knee, initial encounter  S83.512A 844.2   2. Gait disturbance  R26.9 781.2   3. Left knee pain, unspecified chronicity  M25.562 719.46   4. Weakness of left lower extremity  R29.898 729.89   5. Decreased range of motion (ROM) of left knee  M25.662 719.56       Timed:  Manual Therapy:         mins  77968;  Therapeutic Exercise:    18     mins  71896;     Neuromuscular Lance:        mins  44175;    Therapeutic Activity:          mins  73242;     Gait Training:           mins  36082;     Ultrasound:          mins  04499;    Electrical Stimulation:         mins  49295 ( );    Untimed:  Electrical Stimulation:         mins  92401 ( );  Mechanical Traction:         mins  00253;   PT evaluation   30 mins    Timed Treatment:   18   mins   Total Treatment:     48   mins    PT SIGNATURE: Rebeca Duran  PT     Electronically singed 2/24/2022    [place] PT license: [id number]        Initial Certification  Certification Period: 2/24/2022 thru 5/24/2022  I certify that the therapy services are furnished while this patient is under my care.  The services outlined above are required by this patient, and will be reviewed every 90 days.    PHYSICIAN: Troy Hsu MD  NPI: [id number]                                      DATE:     Please sign and return via fax to [phone]  Thank you, [facility].

## 2022-03-02 ENCOUNTER — HOSPITAL ENCOUNTER (EMERGENCY)
Facility: HOSPITAL | Age: 27
Discharge: HOME | End: 2022-03-02
Attending: EMERGENCY MEDICINE | Admitting: EMERGENCY MEDICINE

## 2022-03-02 ENCOUNTER — APPOINTMENT (OUTPATIENT)
Dept: GENERAL RADIOLOGY | Facility: HOSPITAL | Age: 27
End: 2022-03-02

## 2022-03-02 VITALS
SYSTOLIC BLOOD PRESSURE: 148 MMHG | TEMPERATURE: 98 F | DIASTOLIC BLOOD PRESSURE: 84 MMHG | BODY MASS INDEX: 40.82 KG/M2 | HEIGHT: 66 IN | OXYGEN SATURATION: 98 % | HEART RATE: 86 BPM | WEIGHT: 253.97 LBS | RESPIRATION RATE: 18 BRPM

## 2022-03-02 DIAGNOSIS — K59.00 CONSTIPATION, UNSPECIFIED CONSTIPATION TYPE: Primary | ICD-10-CM

## 2022-03-02 LAB
ALBUMIN SERPL-MCNC: 4.2 G/DL (ref 3.5–5.2)
ALBUMIN/GLOB SERPL: 1.1 G/DL
ALP SERPL-CCNC: 81 U/L (ref 39–117)
ALT SERPL W P-5'-P-CCNC: 16 U/L (ref 1–33)
ANION GAP SERPL CALCULATED.3IONS-SCNC: 11.3 MMOL/L (ref 5–15)
AST SERPL-CCNC: 16 U/L (ref 1–32)
BASOPHILS # BLD AUTO: 0.05 10*3/MM3 (ref 0–0.2)
BASOPHILS NFR BLD AUTO: 0.6 % (ref 0–1.5)
BILIRUB SERPL-MCNC: 0.3 MG/DL (ref 0–1.2)
BUN SERPL-MCNC: 22 MG/DL (ref 6–20)
BUN/CREAT SERPL: 18.5 (ref 7–25)
CALCIUM SPEC-SCNC: 9.6 MG/DL (ref 8.6–10.5)
CHLORIDE SERPL-SCNC: 101 MMOL/L (ref 98–107)
CO2 SERPL-SCNC: 22.7 MMOL/L (ref 22–29)
CREAT SERPL-MCNC: 1.19 MG/DL (ref 0.57–1)
DEPRECATED RDW RBC AUTO: 44.3 FL (ref 37–54)
EGFRCR SERPLBLD CKD-EPI 2021: 65.2 ML/MIN/1.73
EOSINOPHIL # BLD AUTO: 0.1 10*3/MM3 (ref 0–0.4)
EOSINOPHIL NFR BLD AUTO: 1.2 % (ref 0.3–6.2)
ERYTHROCYTE [DISTWIDTH] IN BLOOD BY AUTOMATED COUNT: 13.9 % (ref 12.3–15.4)
GLOBULIN UR ELPH-MCNC: 3.8 GM/DL
GLUCOSE SERPL-MCNC: 101 MG/DL (ref 65–99)
HCT VFR BLD AUTO: 42.8 % (ref 34–46.6)
HGB BLD-MCNC: 13.6 G/DL (ref 12–15.9)
HOLD SPECIMEN: NORMAL
HOLD SPECIMEN: NORMAL
IMM GRANULOCYTES # BLD AUTO: 0.04 10*3/MM3 (ref 0–0.05)
IMM GRANULOCYTES NFR BLD AUTO: 0.5 % (ref 0–0.5)
LIPASE SERPL-CCNC: 14 U/L (ref 13–60)
LYMPHOCYTES # BLD AUTO: 0.96 10*3/MM3 (ref 0.7–3.1)
LYMPHOCYTES NFR BLD AUTO: 11.8 % (ref 19.6–45.3)
MCH RBC QN AUTO: 27.3 PG (ref 26.6–33)
MCHC RBC AUTO-ENTMCNC: 31.8 G/DL (ref 31.5–35.7)
MCV RBC AUTO: 85.9 FL (ref 79–97)
MONOCYTES # BLD AUTO: 0.67 10*3/MM3 (ref 0.1–0.9)
MONOCYTES NFR BLD AUTO: 8.2 % (ref 5–12)
NEUTROPHILS NFR BLD AUTO: 6.31 10*3/MM3 (ref 1.7–7)
NEUTROPHILS NFR BLD AUTO: 77.7 % (ref 42.7–76)
NRBC BLD AUTO-RTO: 0 /100 WBC (ref 0–0.2)
PLATELET # BLD AUTO: 287 10*3/MM3 (ref 140–450)
PMV BLD AUTO: 8.6 FL (ref 6–12)
POTASSIUM SERPL-SCNC: 3.6 MMOL/L (ref 3.5–5.2)
PROT SERPL-MCNC: 8 G/DL (ref 6–8.5)
RBC # BLD AUTO: 4.98 10*6/MM3 (ref 3.77–5.28)
SODIUM SERPL-SCNC: 135 MMOL/L (ref 136–145)
WBC NRBC COR # BLD: 8.13 10*3/MM3 (ref 3.4–10.8)
WHOLE BLOOD HOLD SPECIMEN: NORMAL
WHOLE BLOOD HOLD SPECIMEN: NORMAL

## 2022-03-02 PROCEDURE — 36415 COLL VENOUS BLD VENIPUNCTURE: CPT

## 2022-03-02 PROCEDURE — 80053 COMPREHEN METABOLIC PANEL: CPT

## 2022-03-02 PROCEDURE — 74022 RADEX COMPL AQT ABD SERIES: CPT

## 2022-03-02 PROCEDURE — 99283 EMERGENCY DEPT VISIT LOW MDM: CPT

## 2022-03-02 PROCEDURE — 85025 COMPLETE CBC W/AUTO DIFF WBC: CPT

## 2022-03-02 PROCEDURE — 83690 ASSAY OF LIPASE: CPT

## 2022-03-02 RX ORDER — MAGNESIUM CARB/ALUMINUM HYDROX 105-160MG
296 TABLET,CHEWABLE ORAL ONCE
Status: COMPLETED | OUTPATIENT
Start: 2022-03-02 | End: 2022-03-02

## 2022-03-02 RX ORDER — SODIUM CHLORIDE 0.9 % (FLUSH) 0.9 %
10 SYRINGE (ML) INJECTION AS NEEDED
Status: DISCONTINUED | OUTPATIENT
Start: 2022-03-02 | End: 2022-03-02 | Stop reason: HOSPADM

## 2022-03-02 RX ORDER — ONDANSETRON 2 MG/ML
4 INJECTION INTRAMUSCULAR; INTRAVENOUS ONCE
Status: DISCONTINUED | OUTPATIENT
Start: 2022-03-02 | End: 2022-03-02 | Stop reason: HOSPADM

## 2022-03-02 RX ADMIN — MAGNESIUM CITRATE 296 ML: 1.75 LIQUID ORAL at 15:39

## 2022-03-02 RX ADMIN — Medication 300 ML: at 13:10

## 2022-03-02 NOTE — ED PROVIDER NOTES
Time: 10:44 AM EST  Arrived by: private car  Chief Complaint: constipation  History provided by: pt  History is limited by: N/A     History of Present Illness:    Zhane Traylor is a 26 y.o. female who presents to the emergency department today with complaints of constipation. Pt states she has surgery on 2/21 and hasn't had a bowel movement since 2/28. She claims whenever she did go, only a very scant watery amount was produced. She is compliant with pain medication due to her recent surgery. Pt has taken stool softener and enema with no relief. She denies fever, diaphoresis, chills, decreased PO intake or any other pertinent sx.       History provided by:  Patient   used: No        Patient Care Team  Primary Care Provider: Avelina Joyner APRN    Past Medical History:     No Known Allergies  Past Medical History:   Diagnosis Date   • Alcoholism in remission (HCC) 02/05/2021   • Anxiety disorder 01/05/2021   • Asthma    • H/O psychiatric care    • Left anterior cruciate ligament tear    • Major depression 02/05/2021   • Obesity    • Onychomycosis    • PONV (postoperative nausea and vomiting)      Past Surgical History:   Procedure Laterality Date   • DILATATION AND CURETTAGE     • KNEE ACL RECONSTRUCTION Left 2/21/2022    Procedure: LEFT KNEE ATHROSCOPIC ANTERIOR CRUCIATE LIGAMENT RECONSTRUCTION  WITH  ALLOGRAFT;  Surgeon: Troy Hsu MD;  Location: [facility] OR [place];  Service: Orthopedics;  Laterality: Left;   • WISDOM TOOTH EXTRACTION  2012     Family History   Problem Relation Age of Onset   • Heart disease Other    • Diabetes Other        Home Medications:  Prior to Admission medications    Medication Sig Start Date End Date Taking? Authorizing Provider   Advair Diskus 250-50 MCG/DOSE DISKUS Inhale 1 puff 2 (Two) Times a Day. For asthma. Rinse mouth after use  Patient taking differently: Inhale 1 puff 2 (Two) Times a Day As Needed. For asthma. Rinse mouth after use 9/8/21    Bridgette Borden APRN   aspirin EC (Ecotrin) 325 MG tablet Take 1 tablet by mouth Daily. 22   Troy Hsu MD   cetirizine (ZyrTEC Allergy) 10 MG tablet Zyrtec 10 mg oral tablet take 1 tablet (10 mg) by oral route once daily   Active    Provider, MD Mica   clorazepate (TRANXENE) 15 MG tablet take 1 tablet by mouth twice a day 22      Etonogestrel (Nexplanon) 68 MG implant subdermal implant Inject 68 mg into the appropriate area of the skin as directed by provider.    Provider, MD Mica   lamoTRIgine (LaMICtal) 25 MG tablet take 2 tablets by mouth twice a day 22      Latuda 40 MG tablet tablet Take 1 tablet by mouth every night at bedtime. 6/3/21      levalbuterol (XOPENEX HFA) 45 MCG/ACT inhaler Inhale 1-2 puffs Every 4 (Four) Hours As Needed for Wheezing or Shortness of Air. 21   Avelina Joyner APRN   lurasidone (Latuda) 40 MG tablet tablet take 1 tablet by mouth at bedtime 22      ondansetron ODT (Zofran ODT) 4 MG disintegrating tablet Place 1 tablet on the tongue Every 8 (Eight) Hours As Needed for Nausea or Vomiting. 22   Troy Hsu MD   oxyCODONE-acetaminophen (PERCOCET) 7.5-325 MG per tablet Take 1-2 tablets by mouth Every 4 (Four) Hours As Needed for Moderate Pain . 22   Troy Hsu MD   spironolactone (ALDACTONE) 25 MG tablet Take 1 tablet by mouth 3 (Three) Times a Day. 22           Social History:   Social History     Tobacco Use   • Smoking status: Former Smoker     Packs/day: 0.50     Years: 6.00     Pack years: 3.00     Types: Cigarettes     Start date: 10/19/2010     Quit date: 10/19/2016     Years since quittin.3   • Smokeless tobacco: Never Used   Vaping Use   • Vaping Use: Never used   Substance Use Topics   • Alcohol use: Not Currently     Comment: LAST DRINK 2021   • Drug use: Never       Review of Systems:  Review of Systems   Constitutional: Negative for chills and fever.   HENT: Negative for congestion,  "rhinorrhea and sore throat.    Eyes: Negative for pain and visual disturbance.   Respiratory: Negative for apnea, cough, chest tightness and shortness of breath.    Cardiovascular: Negative for chest pain and palpitations.   Gastrointestinal: Positive for constipation. Negative for abdominal pain, diarrhea, nausea and vomiting.   Genitourinary: Negative for difficulty urinating and dysuria.   Musculoskeletal: Negative for joint swelling and myalgias.   Skin: Negative for color change.   Neurological: Negative for seizures and headaches.   Psychiatric/Behavioral: Negative.    All other systems reviewed and are negative.       Physical Exam:  /84   Pulse 86   Temp 98 °F (36.7 °C)   Resp 18   Ht 167.6 cm (66\")   Wt 115 kg (253 lb 15.5 oz)   LMP  (LMP Unknown)   SpO2 98%   Breastfeeding No   BMI 40.99 kg/m²     Physical Exam  Vitals and nursing note reviewed.   Constitutional:       General: She is not in acute distress.     Appearance: Normal appearance.   HENT:      Head: Normocephalic and atraumatic.      Nose: Nose normal.      Mouth/Throat:      Pharynx: Oropharynx is clear.   Eyes:      General: No scleral icterus.     Conjunctiva/sclera: Conjunctivae normal.   Cardiovascular:      Rate and Rhythm: Normal rate and regular rhythm.      Heart sounds: Normal heart sounds. No murmur heard.      Pulmonary:      Effort: No respiratory distress.      Breath sounds: Normal breath sounds. No wheezing, rhonchi or rales.   Chest:      Chest wall: No tenderness.   Abdominal:      General: Bowel sounds are increased.      Palpations: Abdomen is soft.      Tenderness: There is no abdominal tenderness. There is no guarding or rebound.      Comments: No rigidity.   Musculoskeletal:         General: No tenderness. Normal range of motion.      Cervical back: Normal range of motion and neck supple.      Right lower leg: No edema.      Left lower leg: No edema.   Skin:     General: Skin is warm and dry.   Neurological: "      Mental Status: She is alert. Mental status is at baseline.   Psychiatric:         Mood and Affect: Mood normal.         Behavior: Behavior normal.                Medications in the Emergency Department:  Medications   molasses enema (300 mL Rectal Given 3/2/22 1310)   magnesium citrate solution 296 mL (296 mL Oral Given 3/2/22 1539)        Labs  Lab Results (last 24 hours)     ** No results found for the last 24 hours. **           Imaging:  No Radiology Exams Resulted Within Past 24 Hours    Procedures:  Procedures    Progress                            Medical Decision Making:  MDM   Limited results with enema. No obstruction on Xray. Patient stable for discharge and will use Magnesium Citrate at home.          Final diagnoses:   Constipation, unspecified constipation type        Disposition:  ED Disposition     ED Disposition   Discharge    Condition   Stable    Comment   --             Documentation assistance provided by Davina Arriaza acting as scribe for Nithin Dimas DO. Information recorded by the scribe was done at my direction and has been verified and validated by me.          Davina Arriaza  03/02/22 1200       Nithin Dimas DO  03/05/22 5507

## 2022-03-04 ENCOUNTER — TREATMENT (OUTPATIENT)
Dept: PHYSICAL THERAPY | Facility: CLINIC | Age: 27
End: 2022-03-04

## 2022-03-04 DIAGNOSIS — M25.562 LEFT KNEE PAIN, UNSPECIFIED CHRONICITY: ICD-10-CM

## 2022-03-04 DIAGNOSIS — S83.512A RUPTURE OF ANTERIOR CRUCIATE LIGAMENT OF LEFT KNEE, INITIAL ENCOUNTER: Primary | ICD-10-CM

## 2022-03-04 DIAGNOSIS — R26.9 GAIT DISTURBANCE: ICD-10-CM

## 2022-03-04 DIAGNOSIS — R29.898 WEAKNESS OF LEFT LOWER EXTREMITY: ICD-10-CM

## 2022-03-04 DIAGNOSIS — M25.662 DECREASED RANGE OF MOTION (ROM) OF LEFT KNEE: ICD-10-CM

## 2022-03-04 PROCEDURE — 97140 MANUAL THERAPY 1/> REGIONS: CPT | Performed by: PHYSICAL THERAPIST

## 2022-03-04 PROCEDURE — 97110 THERAPEUTIC EXERCISES: CPT | Performed by: PHYSICAL THERAPIST

## 2022-03-04 NOTE — PROGRESS NOTES
Physical Therapy Daily Treatment Note    Patient: Zhane Traylor   : 1995  Referring practitioner: Troy Hsu MD  Date of Initial Visit: Type: THERAPY  Noted: 2022  Today's Date: 3/4/2022  Patient seen for 2 sessions         Subjective Evaluation    Pain  Current pain ratin         Zhane Traylor reports: She is doing her HEP and doing well overall.     Objective          Ambulation     Observational Gait   Decreased walking speed and left stance time.       See Exercise, Manual, and Modality Logs for complete treatment.     Assessment & Plan     Assessment    Assessment details: Pt tolerated session well overall. Progressed some exercises as tolerated.    Plan  Therapy options: will be seen for skilled therapy services  Plan details: Continue with POC.      Visit Diagnoses:    ICD-10-CM ICD-9-CM   1. Rupture of anterior cruciate ligament of left knee, initial encounter  S83.512A 844.2   2. Gait disturbance  R26.9 781.2   3. Left knee pain, unspecified chronicity  M25.562 719.46   4. Weakness of left lower extremity  R29.898 729.89   5. Decreased range of motion (ROM) of left knee  M25.662 719.56     Progress per Plan of Care         Timed:  Manual Therapy:    5     mins  70043;  Therapeutic Exercise:    23     mins  64831;     Neuromuscular Lance:    0    mins  79350;    Therapeutic Activity:     0     mins  94984;     Gait Trainin     mins  84170;     Aquatic Therapy     0     mins  35129;     Ultrasound:     0     mins  21858;    Electrical Stimulation:    0     mins  42103 ( );    Untimed:  Electrical Stimulation:    0     mins  05975 ( );  Mechanical Traction:    0     mins  22677;     Timed Treatment:   27   mins   Total Treatment:     27  mins  Shalini Morelos PT    [place] License: [id number]

## 2022-03-07 ENCOUNTER — APPOINTMENT (OUTPATIENT)
Dept: GENERAL RADIOLOGY | Facility: HOSPITAL | Age: 27
End: 2022-03-07

## 2022-03-07 ENCOUNTER — HOSPITAL ENCOUNTER (EMERGENCY)
Facility: HOSPITAL | Age: 27
Discharge: HOME | End: 2022-03-07
Attending: EMERGENCY MEDICINE | Admitting: EMERGENCY MEDICINE

## 2022-03-07 ENCOUNTER — APPOINTMENT (OUTPATIENT)
Dept: CT IMAGING | Facility: HOSPITAL | Age: 27
End: 2022-03-07

## 2022-03-07 VITALS
DIASTOLIC BLOOD PRESSURE: 72 MMHG | RESPIRATION RATE: 18 BRPM | BODY MASS INDEX: 40.43 KG/M2 | TEMPERATURE: 98.1 F | HEIGHT: 66 IN | SYSTOLIC BLOOD PRESSURE: 131 MMHG | WEIGHT: 251.54 LBS | HEART RATE: 96 BPM | OXYGEN SATURATION: 100 %

## 2022-03-07 DIAGNOSIS — N39.0 ACUTE UTI: Primary | ICD-10-CM

## 2022-03-07 PROBLEM — Z47.89 AFTERCARE FOLLOWING SURGERY OF THE MUSCULOSKELETAL SYSTEM: Status: ACTIVE | Noted: 2022-03-07

## 2022-03-07 LAB
ALBUMIN SERPL-MCNC: 4.4 G/DL (ref 3.5–5.2)
ALBUMIN/GLOB SERPL: 1.3 G/DL
ALP SERPL-CCNC: 91 U/L (ref 39–117)
ALT SERPL W P-5'-P-CCNC: 17 U/L (ref 1–33)
ANION GAP SERPL CALCULATED.3IONS-SCNC: 11.5 MMOL/L (ref 5–15)
AST SERPL-CCNC: 17 U/L (ref 1–32)
BACTERIA UR QL AUTO: ABNORMAL /HPF
BASOPHILS # BLD AUTO: 0.06 10*3/MM3 (ref 0–0.2)
BASOPHILS NFR BLD AUTO: 1.1 % (ref 0–1.5)
BILIRUB SERPL-MCNC: 0.3 MG/DL (ref 0–1.2)
BILIRUB UR QL STRIP: NEGATIVE
BUN SERPL-MCNC: 12 MG/DL (ref 6–20)
BUN/CREAT SERPL: 10.9 (ref 7–25)
CALCIUM SPEC-SCNC: 9.8 MG/DL (ref 8.6–10.5)
CHLORIDE SERPL-SCNC: 102 MMOL/L (ref 98–107)
CK MB SERPL-CCNC: <1 NG/ML
CK SERPL-CCNC: 71 U/L (ref 20–180)
CLARITY UR: ABNORMAL
CO2 SERPL-SCNC: 23.5 MMOL/L (ref 22–29)
COLOR UR: YELLOW
CREAT SERPL-MCNC: 1.1 MG/DL (ref 0.57–1)
DEPRECATED RDW RBC AUTO: 44.9 FL (ref 37–54)
EGFRCR SERPLBLD CKD-EPI 2021: 71.7 ML/MIN/1.73
EOSINOPHIL # BLD AUTO: 0.2 10*3/MM3 (ref 0–0.4)
EOSINOPHIL NFR BLD AUTO: 3.5 % (ref 0.3–6.2)
ERYTHROCYTE [DISTWIDTH] IN BLOOD BY AUTOMATED COUNT: 14 % (ref 12.3–15.4)
GLOBULIN UR ELPH-MCNC: 3.3 GM/DL
GLUCOSE SERPL-MCNC: 88 MG/DL (ref 65–99)
GLUCOSE UR STRIP-MCNC: NEGATIVE MG/DL
HCT VFR BLD AUTO: 44.3 % (ref 34–46.6)
HGB BLD-MCNC: 14.1 G/DL (ref 12–15.9)
HGB UR QL STRIP.AUTO: NEGATIVE
HOLD SPECIMEN: NORMAL
HYALINE CASTS UR QL AUTO: ABNORMAL /LPF
IMM GRANULOCYTES # BLD AUTO: 0.01 10*3/MM3 (ref 0–0.05)
IMM GRANULOCYTES NFR BLD AUTO: 0.2 % (ref 0–0.5)
KETONES UR QL STRIP: NEGATIVE
LEUKOCYTE ESTERASE UR QL STRIP.AUTO: ABNORMAL
LIPASE SERPL-CCNC: 13 U/L (ref 13–60)
LYMPHOCYTES # BLD AUTO: 1.36 10*3/MM3 (ref 0.7–3.1)
LYMPHOCYTES NFR BLD AUTO: 24.1 % (ref 19.6–45.3)
MAGNESIUM SERPL-MCNC: 2.2 MG/DL (ref 1.6–2.6)
MCH RBC QN AUTO: 27.8 PG (ref 26.6–33)
MCHC RBC AUTO-ENTMCNC: 31.8 G/DL (ref 31.5–35.7)
MCV RBC AUTO: 87.4 FL (ref 79–97)
MONOCYTES # BLD AUTO: 0.71 10*3/MM3 (ref 0.1–0.9)
MONOCYTES NFR BLD AUTO: 12.6 % (ref 5–12)
NEUTROPHILS NFR BLD AUTO: 3.3 10*3/MM3 (ref 1.7–7)
NEUTROPHILS NFR BLD AUTO: 58.5 % (ref 42.7–76)
NITRITE UR QL STRIP: NEGATIVE
NRBC BLD AUTO-RTO: 0 /100 WBC (ref 0–0.2)
NT-PROBNP SERPL-MCNC: 31.4 PG/ML (ref 0–450)
PH UR STRIP.AUTO: 5.5 [PH] (ref 5–8)
PLATELET # BLD AUTO: 280 10*3/MM3 (ref 140–450)
PMV BLD AUTO: 9.2 FL (ref 6–12)
POTASSIUM SERPL-SCNC: 4 MMOL/L (ref 3.5–5.2)
PROT SERPL-MCNC: 7.7 G/DL (ref 6–8.5)
PROT UR QL STRIP: NEGATIVE
RBC # BLD AUTO: 5.07 10*6/MM3 (ref 3.77–5.28)
RBC # UR STRIP: ABNORMAL /HPF
REF LAB TEST METHOD: ABNORMAL
SODIUM SERPL-SCNC: 137 MMOL/L (ref 136–145)
SP GR UR STRIP: 1.02 (ref 1–1.03)
SQUAMOUS #/AREA URNS HPF: ABNORMAL /HPF
TROPONIN I SERPL-MCNC: 0 NG/ML (ref 0–0.6)
TROPONIN I SERPL-MCNC: 0 NG/ML (ref 0–0.6)
UROBILINOGEN UR QL STRIP: ABNORMAL
WBC # UR STRIP: ABNORMAL /HPF
WBC NRBC COR # BLD: 5.64 10*3/MM3 (ref 3.4–10.8)
WHOLE BLOOD HOLD SPECIMEN: NORMAL
WHOLE BLOOD HOLD SPECIMEN: NORMAL

## 2022-03-07 PROCEDURE — 83880 ASSAY OF NATRIURETIC PEPTIDE: CPT

## 2022-03-07 PROCEDURE — 74177 CT ABD & PELVIS W/CONTRAST: CPT

## 2022-03-07 PROCEDURE — 84484 ASSAY OF TROPONIN QUANT: CPT

## 2022-03-07 PROCEDURE — 82550 ASSAY OF CK (CPK): CPT

## 2022-03-07 PROCEDURE — 0 IOPAMIDOL PER 1 ML: Performed by: EMERGENCY MEDICINE

## 2022-03-07 PROCEDURE — 83690 ASSAY OF LIPASE: CPT

## 2022-03-07 PROCEDURE — 99284 EMERGENCY DEPT VISIT MOD MDM: CPT

## 2022-03-07 PROCEDURE — 96365 THER/PROPH/DIAG IV INF INIT: CPT

## 2022-03-07 PROCEDURE — 81001 URINALYSIS AUTO W/SCOPE: CPT

## 2022-03-07 PROCEDURE — 71260 CT THORAX DX C+: CPT

## 2022-03-07 PROCEDURE — 85025 COMPLETE CBC W/AUTO DIFF WBC: CPT

## 2022-03-07 PROCEDURE — 80053 COMPREHEN METABOLIC PANEL: CPT

## 2022-03-07 PROCEDURE — 93005 ELECTROCARDIOGRAM TRACING: CPT | Performed by: EMERGENCY MEDICINE

## 2022-03-07 PROCEDURE — 71045 X-RAY EXAM CHEST 1 VIEW: CPT

## 2022-03-07 PROCEDURE — 83735 ASSAY OF MAGNESIUM: CPT

## 2022-03-07 PROCEDURE — 93005 ELECTROCARDIOGRAM TRACING: CPT

## 2022-03-07 PROCEDURE — 25010000002 CEFTRIAXONE PER 250 MG: Performed by: EMERGENCY MEDICINE

## 2022-03-07 PROCEDURE — 36415 COLL VENOUS BLD VENIPUNCTURE: CPT

## 2022-03-07 PROCEDURE — 82553 CREATINE MB FRACTION: CPT

## 2022-03-07 RX ORDER — ASPIRIN 81 MG/1
324 TABLET, CHEWABLE ORAL ONCE
Status: COMPLETED | OUTPATIENT
Start: 2022-03-07 | End: 2022-03-07

## 2022-03-07 RX ORDER — SODIUM CHLORIDE 0.9 % (FLUSH) 0.9 %
10 SYRINGE (ML) INJECTION AS NEEDED
Status: DISCONTINUED | OUTPATIENT
Start: 2022-03-07 | End: 2022-03-07 | Stop reason: HOSPADM

## 2022-03-07 RX ORDER — CEPHALEXIN 500 MG/1
500 CAPSULE ORAL 3 TIMES DAILY
Qty: 15 CAPSULE | Refills: 0 | OUTPATIENT
Start: 2022-03-07 | End: 2022-04-15

## 2022-03-07 RX ADMIN — IOPAMIDOL 100 ML: 755 INJECTION, SOLUTION INTRAVENOUS at 17:14

## 2022-03-07 RX ADMIN — SODIUM CHLORIDE 1 G: 900 INJECTION INTRAVENOUS at 16:22

## 2022-03-07 RX ADMIN — ASPIRIN 81 MG CHEWABLE TABLET 324 MG: 81 TABLET CHEWABLE at 16:20

## 2022-03-07 NOTE — ED PROVIDER NOTES
Time: 4:01 PM EST  Arrived by: private car  Chief Complaint: constipation and chest pain  History provided by: patient  History is limited by: N/A     History of Present Illness:  Patient is a 26 y.o. year old female who presents to the emergency department with constipation for the past 2 weeks that is not gotten any better.  Patient also reports chest pain.  Patient denies dysuria but does report urinary frequency.  Patient states that she has no cough or hemoptysis.  She states that she has not been fully constipated but has also had some diarrhea.  Patient denies leg pain or swelling.  Patient denies nausea, vomiting, and diaphoresis.  Patient also denies abdominal pain.      Chest Pain  Associated symptoms: shortness of breath    Associated symptoms: no abdominal pain, no cough, no fever, no headache, no nausea, no palpitations and no vomiting    Shortness of Breath  Associated symptoms: chest pain    Associated symptoms: no abdominal pain, no cough, no fever, no headaches, no sore throat and no vomiting        Similar Symptoms Previously: no  Recently seen: no      Patient Care Team  Primary Care Provider: Avelina Joyner APRN    Past Medical History:     No Known Allergies  Past Medical History:   Diagnosis Date   • Alcoholism in remission (HCC) 02/05/2021   • Anxiety disorder 01/05/2021   • Asthma    • Constipation    • H/O psychiatric care    • Left anterior cruciate ligament tear    • Major depression 02/05/2021   • Obesity    • Onychomycosis    • PONV (postoperative nausea and vomiting)      Past Surgical History:   Procedure Laterality Date   • DILATATION AND CURETTAGE     • KNEE ACL RECONSTRUCTION Left 2/21/2022    Procedure: LEFT KNEE ATHROSCOPIC ANTERIOR CRUCIATE LIGAMENT RECONSTRUCTION  WITH  ALLOGRAFT;  Surgeon: Troy Hsu MD;  Location: [facility] [place];  Service: Orthopedics;  Laterality: Left;   • WISDOM TOOTH EXTRACTION  2012     Family History   Problem Relation Age of Onset   •  Heart disease Other    • Diabetes Other        Home Medications:  Prior to Admission medications    Medication Sig Start Date End Date Taking? Authorizing Provider   Advair Diskus 250-50 MCG/DOSE DISKUS Inhale 1 puff 2 (Two) Times a Day. For asthma. Rinse mouth after use  Patient taking differently: Inhale 1 puff 2 (Two) Times a Day As Needed. For asthma. Rinse mouth after use 9/8/21   Bridgette Borden APRN   aspirin EC (Ecotrin) 325 MG tablet Take 1 tablet by mouth Daily. 2/21/22   Troy Hsu MD   cetirizine (ZyrTEC Allergy) 10 MG tablet Zyrtec 10 mg oral tablet take 1 tablet (10 mg) by oral route once daily   Active    Provider, MD Mica   clorazepate (TRANXENE) 15 MG tablet take 1 tablet by mouth twice a day 2/9/22      Etonogestrel (Nexplanon) 68 MG implant subdermal implant Inject 68 mg into the appropriate area of the skin as directed by provider.    Provider, MD Mica   lamoTRIgine (LaMICtal) 25 MG tablet take 2 tablets by mouth twice a day 2/9/22      Latuda 40 MG tablet tablet Take 1 tablet by mouth every night at bedtime. 6/3/21      levalbuterol (XOPENEX HFA) 45 MCG/ACT inhaler Inhale 1-2 puffs Every 4 (Four) Hours As Needed for Wheezing or Shortness of Air. 9/28/21   Avelina Joyner APRN   lurasidone (Latuda) 40 MG tablet tablet take 1 tablet by mouth at bedtime 2/9/22      ondansetron ODT (Zofran ODT) 4 MG disintegrating tablet Place 1 tablet on the tongue Every 8 (Eight) Hours As Needed for Nausea or Vomiting. 2/21/22   Troy Hsu MD   oxyCODONE-acetaminophen (PERCOCET) 7.5-325 MG per tablet Take 1-2 tablets by mouth Every 4 (Four) Hours As Needed for Moderate Pain . 2/21/22   Troy Hsu MD   spironolactone (ALDACTONE) 25 MG tablet Take 1 tablet by mouth 3 (Three) Times a Day. 1/5/22           Social History:   Social History     Tobacco Use   • Smoking status: Former Smoker     Packs/day: 0.50     Years: 6.00     Pack years: 3.00     Types: Cigarettes  "    Start date: 10/19/2010     Quit date: 10/19/2016     Years since quittin.3   • Smokeless tobacco: Never Used   Vaping Use   • Vaping Use: Never used   Substance Use Topics   • Alcohol use: Not Currently     Comment: LAST DRINK 2021   • Drug use: Never     Recent travel: no     Review of Systems:  Review of Systems   Constitutional: Negative for chills and fever.   HENT: Negative for congestion, rhinorrhea and sore throat.    Eyes: Negative for pain and visual disturbance.   Respiratory: Positive for shortness of breath. Negative for apnea, cough and chest tightness.    Cardiovascular: Positive for chest pain. Negative for palpitations.   Gastrointestinal: Negative for abdominal pain, diarrhea, nausea and vomiting.   Genitourinary: Negative for difficulty urinating and dysuria.   Musculoskeletal: Negative for joint swelling and myalgias.   Skin: Negative for color change.   Neurological: Negative for seizures and headaches.   Psychiatric/Behavioral: Negative.    All other systems reviewed and are negative.       Physical Exam:  /72 (BP Location: Right arm, Patient Position: Sitting)   Pulse 96   Temp 98.1 °F (36.7 °C) (Oral)   Resp 18   Ht 167.6 cm (66\")   Wt 114 kg (251 lb 8.7 oz)   LMP  (LMP Unknown)   SpO2 100%   BMI 40.60 kg/m²     Physical Exam  Vitals and nursing note reviewed.   Constitutional:       General: She is not in acute distress.     Appearance: Normal appearance. She is not toxic-appearing.   HENT:      Head: Normocephalic and atraumatic.      Jaw: There is normal jaw occlusion.   Eyes:      General: Lids are normal.      Extraocular Movements: Extraocular movements intact.      Conjunctiva/sclera: Conjunctivae normal.      Pupils: Pupils are equal, round, and reactive to light.   Cardiovascular:      Rate and Rhythm: Normal rate and regular rhythm.      Pulses: Normal pulses.      Heart sounds: Normal heart sounds.   Pulmonary:      Effort: Pulmonary effort is normal. No " respiratory distress.      Breath sounds: Normal breath sounds. No wheezing or rhonchi.   Abdominal:      General: Abdomen is flat.      Palpations: Abdomen is soft.      Tenderness: There is no abdominal tenderness. There is no guarding or rebound.   Musculoskeletal:         General: Normal range of motion.      Cervical back: Normal range of motion and neck supple.      Right lower leg: No edema.      Left lower leg: No edema.   Skin:     General: Skin is warm and dry.   Neurological:      Mental Status: She is alert and oriented to person, place, and time. Mental status is at baseline.   Psychiatric:         Mood and Affect: Mood normal.                Medications in the Emergency Department:  Medications   sodium chloride 0.9 % flush 10 mL (has no administration in time range)   aspirin chewable tablet 324 mg (324 mg Oral Given 3/7/22 1620)   cefTRIAXone (ROCEPHIN) 1 g/100 mL 0.9% NS (MBP) (0 g Intravenous Stopped 3/7/22 1731)   iopamidol (ISOVUE-370) 76 % injection 100 mL (100 mL Intravenous Given 3/7/22 1714)        Labs  Lab Results (last 24 hours)     Procedure Component Value Units Date/Time    POC Troponin I with Hold Tube [[id number]] Collected: 03/07/22 1422    Specimen: Blood Updated: 03/07/22 1432    Narrative:      The following orders were created for panel order POC Troponin I with Hold Tube.  Procedure                               Abnormality         Status                     ---------                               -----------         ------                     POC Troponin I[[id number]]                                                              HOLD Troponin-I Tube[[id number]]                             Final result                 Please view results for these tests on the individual orders.    CBC & Differential [[id number]]  (Abnormal) Collected: 03/07/22 1422    Specimen: Blood Updated: 03/07/22 1450    Narrative:      The following orders were created for panel order CBC &  Differential.  Procedure                               Abnormality         Status                     ---------                               -----------         ------                     CBC Auto Differential[[id number]]        Abnormal            Final result                 Please view results for these tests on the individual orders.    Comprehensive Metabolic Panel [[id number]]  (Abnormal) Collected: 03/07/22 1422    Specimen: Blood Updated: 03/07/22 1507     Glucose 88 mg/dL      BUN 12 mg/dL      Creatinine 1.10 mg/dL      Sodium 137 mmol/L      Potassium 4.0 mmol/L      Chloride 102 mmol/L      CO2 23.5 mmol/L      Calcium 9.8 mg/dL      Total Protein 7.7 g/dL      Albumin 4.40 g/dL      ALT (SGPT) 17 U/L      AST (SGOT) 17 U/L      Alkaline Phosphatase 91 U/L      Total Bilirubin 0.3 mg/dL      Globulin 3.3 gm/dL      A/G Ratio 1.3 g/dL      BUN/Creatinine Ratio 10.9     Anion Gap 11.5 mmol/L      eGFR 71.7 mL/min/1.73      Comment: National Kidney Foundation and American Society of Nephrology (ASN) Task Force recommended calculation based on the Chronic Kidney Disease Epidemiology Collaboration (CKD-EPI) equation refit without adjustment for race.       Narrative:      GFR Normal >60  Chronic Kidney Disease <60  Kidney Failure <15      Lipase [[id number]]  (Normal) Collected: 03/07/22 1422    Specimen: Blood Updated: 03/07/22 1507     Lipase 13 U/L     BNP [[id number]]  (Normal) Collected: 03/07/22 1422    Specimen: Blood Updated: 03/07/22 1510     proBNP 31.4 pg/mL     Narrative:      Among patients with dyspnea, NT-proBNP is highly sensitive for the detection of acute congestive heart failure. In addition NT-proBNP of <300 pg/ml effectively rules out acute congestive heart failure with 99% negative predictive value.    Results may be falsely decreased if patient taking Biotin.      Magnesium [[id number]]  (Normal) Collected: 03/07/22 1422    Specimen: Blood Updated: 03/07/22 1507     Magnesium 2.2 mg/dL      CK Total & CKMB [[id number]]  (Normal) Collected: 03/07/22 1422    Specimen: Blood Updated: 03/07/22 1511     CKMB <1.00 ng/mL      Creatine Kinase 71 U/L     Narrative:      CKMB results may be falsely decreased if patient taking Biotin.    CBC Auto Differential [[id number]]  (Abnormal) Collected: 03/07/22 1422    Specimen: Blood Updated: 03/07/22 1450     WBC 5.64 10*3/mm3      RBC 5.07 10*6/mm3      Hemoglobin 14.1 g/dL      Hematocrit 44.3 %      MCV 87.4 fL      MCH 27.8 pg      MCHC 31.8 g/dL      RDW 14.0 %      RDW-SD 44.9 fl      MPV 9.2 fL      Platelets 280 10*3/mm3      Neutrophil % 58.5 %      Lymphocyte % 24.1 %      Monocyte % 12.6 %      Eosinophil % 3.5 %      Basophil % 1.1 %      Immature Grans % 0.2 %      Neutrophils, Absolute 3.30 10*3/mm3      Lymphocytes, Absolute 1.36 10*3/mm3      Monocytes, Absolute 0.71 10*3/mm3      Eosinophils, Absolute 0.20 10*3/mm3      Basophils, Absolute 0.06 10*3/mm3      Immature Grans, Absolute 0.01 10*3/mm3      nRBC 0.0 /100 WBC     POC Troponin I [[id number]]  (Normal) Collected: 03/07/22 1425    Specimen: Blood Updated: 03/07/22 1438     Troponin I 0.00 ng/mL      Comment: Serial Number: [id number]:  [id number]       Urinalysis With Microscopic If Indicated (No Culture) - Urine, Clean Catch [[id number]]  (Abnormal) Collected: 03/07/22 1449    Specimen: Urine, Clean Catch Updated: 03/07/22 1520     Color, UA Yellow     Appearance, UA Cloudy     pH, UA 5.5     Specific Gravity, UA 1.020     Glucose, UA Negative     Ketones, UA Negative     Bilirubin, UA Negative     Blood, UA Negative     Protein, UA Negative     Leuk Esterase, UA Trace     Nitrite, UA Negative     Urobilinogen, UA 0.2 E.U./dL    Urinalysis, Microscopic Only - Urine, Clean Catch [[id number]]  (Abnormal) Collected: 03/07/22 1449    Specimen: Urine, Clean Catch Updated: 03/07/22 1520     RBC, UA 0-2 /HPF      WBC, UA 6-12 /HPF      Bacteria, UA 2+ /HPF      Squamous Epithelial Cells, UA 7-12 /HPF       Hyaline Casts, UA 0-2 /LPF      Methodology Automated Microscopy    POC Troponin I [[id number]]  (Normal) Collected: 03/07/22 1639    Specimen: Blood Updated: 03/07/22 1652     Troponin I 0.00 ng/mL      Comment: Serial Number: [id number]:  [id number]              Imaging:  CT Chest With Contrast Diagnostic    Result Date: 3/7/2022  PROCEDURE: CT CHEST W CONTRAST DIAGNOSTIC  COMPARISON:  None INDICATIONS: shortness of breath  TECHNIQUE: After obtaining the patient's consent, CT images were obtained with non-ionic intravenous contrast material.   PROTOCOL:   Standard imaging protocol performed    RADIATION:   DLP: 497.1mGy*cm   Automated exposure control was utilized to minimize radiation dose.  40cc Isovue 370 I.V.  FINDINGS:  Lungs are clear.  No adenopathy in the chest.  No aggressive appearing bone change.       No acute findings in the chest     SOFIA CONNER MD       Electronically Signed and Approved By: SOFIA CONNER MD on 3/07/2022 at 17:24             CT Abdomen Pelvis With Contrast    Result Date: 3/7/2022  PROCEDURE: CT ABDOMEN PELVIS W CONTRAST  COMPARISON: None  INDICATIONS: abdominal pain  TECHNIQUE: After obtaining the patient's consent, CT images were created with non-ionic intravenous contrast material.   PROTOCOL:   Standard imaging protocol performed    RADIATION:   DLP: 1026mGy*cm   Automated exposure control was utilized to minimize radiation dose. CONTRAST: 40cc Isovue 370 I.V.  FINDINGS:  Refer to the separately dictated chest CT.  Abdomen:  Liver, spleen, kidneys, adrenal glands, pancreas, gallbladder unremarkable.  Bowel loops are nondilated.  Normal appendix.  Pelvis:  No pelvic mass or fluid.  No aggressive appearing bone change.       No acute findings     SOFIA CONNER MD       Electronically Signed and Approved By: SOFIA CONNER MD on 3/07/2022 at 17:25             XR Chest 1 View    Result Date: 3/7/2022  PROCEDURE: XR CHEST 1 VW  COMPARISON: [facility], CR, XR ABDOMEN 2+  VIEWS W CHEST 1 VW, 3/02/2022, 11:28.  INDICATIONS: CHEST PAIN , SHORTNESS OF BREATH FOR 3 DAYS  FINDINGS:  LUNGS: Normal.  No significant pulmonary parenchymal abnormalities.  VASCULATURE: Normal.  Unremarkable pulmonary vasculature.  CARDIAC: Normal.  No cardiac silhouette abnormality or cardiomegaly.  MEDIASTINUM: Normal.  No visible mass or adenopathy.  PLEURA: Normal.  No effusion or pleural thickening.  BONES: Normal.  No fracture or visible bony lesion.  OTHER: Negative.        No acute disease.  No significant change has occurred.        FLORI COHEN MD       Electronically Signed and Approved By: FLORI COHEN MD on 3/07/2022 at 15:29               Procedures:  Procedures    Progress                            Medical Decision Making:  MDM  Number of Diagnoses or Management Options  Acute UTI  Diagnosis management comments: Based on the results of the patient´s urinalysis and urinary complaints, signs, symptoms, and diagnostic testing is consistent with a urinary tract infection.  The patient´s CBC was reviewed and shows no abnormalities of critical concern. Of note, there is no anemia requiring a blood transfusion and the platelet count is acceptable.  The patient´s CMP was reviewed and shows no abnormalities of critical concern. Of note, the patient´s sodium and potassium are acceptable. The patient´s liver enzymes are unremarkable. The patient´s renal function (creatinine) is preserved. The patient has a normal anion gap.  Urinalysis shows 2+ bacteriuria.  CT scan of the abdomen pelvis is negative for acute intra-abdominal pathology.  CT chest is negative.  Patient is resting comfortably, is alert, and is in no distress. The repeat examination is unremarkable and benign. The patient has no signs of urosepsis. The patient was started on antibiotics in the emergency department and will be discharged with antibiotics as an outpatient. The patient was counseled to return to the ER for fever >100.5,  intractable pain or vomiting, or any other concerns that the may have. The patient has expressed a clear and thorough understanding and agreed to follow up as instructed.        Amount and/or Complexity of Data Reviewed  Clinical lab tests: reviewed  Tests in the radiology section of CPT®: reviewed  Tests in the medicine section of CPT®: reviewed    Risk of Complications, Morbidity, and/or Mortality  Presenting problems: moderate  Management options: moderate    Patient Progress  Patient progress: stable       Final diagnoses:   Acute UTI        Disposition:  ED Disposition     ED Disposition   Discharge    Condition   Stable    Comment   --             This medical record created using voice recognition software and may contain unintended errors.           Danie Tong MD  03/07/22 5833

## 2022-03-07 NOTE — EXTERNAL PATIENT INSTRUCTIONS
Patient Education   Table of Contents       Urinary Tract Infection, Adult     To view videos and all your education online visit,   [email or web address]   or scan this QR code with your smartphone.                  Urinary Tract Infection, Adult         A urinary tract infection (UTI) is an infection of any part of the urinary tract. The urinary tract includes:       The kidneys.       The ureters.       The bladder.       The urethra.     These organs make, store, and get rid of pee (urine) in the body.     What are the causes?   This infection is caused by germs (bacteria) in your genital area. These germs grow and cause swelling (inflammation) of your urinary tract.   What increases the risk?    The following factors may make you more likely to develop this condition:       Using a small, thin tube (catheter) to drain pee.       Not being able to control when you pee or poop (incontinence).      Being female. If you are female, these things can increase the risk:      Using these methods to prevent pregnancy:       A medicine that kills sperm (spermicide).       A device that blocks sperm (diaphragm).           Having low levels of a female hormone (estrogen).       Being pregnant.      You are more likely to develop this condition if:       You have genes that add to your risk.       You are sexually active.       You take antibiotic medicines.      You have trouble peeing because of:       A prostate that is bigger than normal, if you are male.       A blockage in the part of your body that drains pee from the bladder.       A kidney stone.       A nerve condition that affects your bladder.       Not getting enough to drink.       Not peeing often enough.      You have other conditions, such as:       Diabetes.       A weak disease-fighting system (immune system).       Sickle cell disease.       Gout.       Injury of the spine.     What are the signs or symptoms?    Symptoms of this condition  include:       Needing to pee right away.       Peeing small amounts often.       Pain or burning when peeing.       Blood in the pee.       Pee that smells bad or not like normal.       Trouble peeing.       Pee that is cloudy.       Fluid coming from the vagina, if you are female.       Pain in the belly or lower back.      Other symptoms include:       Vomiting.       Not feeling hungry.       Feeling mixed up (confused). This may be the first symptom in older adults.       Being tired and grouchy (irritable).       A fever.       Watery poop (diarrhea).     How is this treated?         Taking antibiotic medicine.       Taking other medicines.       Drinking enough water.     In some cases, you may need to see a specialist.   Follow these instructions at home:      Medicines         Take over-the-counter and prescription medicines only as told by your doctor.       If you were prescribed an antibiotic medicine, take it as told by your doctor. Do not  stop taking it even if you start to feel better.     General instructions        Make sure you:       Pee until your bladder is empty.      Do not  hold pee for a long time.       Empty your bladder after sex.       Wipe from front to back after peeing or pooping if you are a female. Use each tissue one time when you wipe.       Drink enough fluid to keep your pee pale yellow.       Keep all follow-up visits.       Contact a doctor if:         You do not get better after 1?2 days.       Your symptoms go away and then come back.     Get help right away if:         You have very bad back pain.       You have very bad pain in your lower belly.       You have a fever.       You have chills.       You feeling like you will vomit or you vomit.     Summary         A urinary tract infection (UTI) is an infection of any part of the urinary tract.       This condition is caused by germs in your genital area.       There are many risk factors for a UTI.       Treatment includes  antibiotic medicines.       Drink enough fluid to keep your pee pale yellow.     This information is not intended to replace advice given to you by your health care provider. Make sure you discuss any questions you have with your health care provider.     Document Released: 06/05/2009Document Revised: 07/30/2021Document Reviewed: 07/30/2021     Elsevier Patient Education ? 2021 [employer]

## 2022-03-08 ENCOUNTER — OFFICE VISIT (OUTPATIENT)
Dept: ORTHOPEDIC SURGERY | Facility: CLINIC | Age: 27
End: 2022-03-08

## 2022-03-08 VITALS — WEIGHT: 246 LBS | HEIGHT: 66 IN | OXYGEN SATURATION: 96 % | HEART RATE: 111 BPM | BODY MASS INDEX: 39.53 KG/M2

## 2022-03-08 DIAGNOSIS — Z47.89 AFTERCARE FOLLOWING SURGERY OF THE MUSCULOSKELETAL SYSTEM: Primary | ICD-10-CM

## 2022-03-08 PROCEDURE — 99024 POSTOP FOLLOW-UP VISIT: CPT | Performed by: PHYSICIAN ASSISTANT

## 2022-03-08 NOTE — PROGRESS NOTES
"Chief Complaint  Pain of the Left Knee    Subjective          Zhane Traylor is a 26 y.o. female  presents to [facility] for   History of Present Illness    Patient presents for 2-week postoperative evaluation of left knee arthroscopic ACL reconstruction with allograft, 2022.  Patient states her knee is doing well she does state that she went to the ER twice since her surgery once for constipation, another time for shortness of air and chest pain and she was found to have a bad UTI.  Patient states those issues are resolving.  Patient states her knee is doing well she presents with crutches and her ACL knee brace.  She states she has been compliant with brace use she is attending therapy twice a week at the [facility].  Patient states that she is not taking any pain medication she only takes NSAIDs/Tylenol as needed.  Patient denies calf pain, denies swelling, denies fever/chills, denies drainage at the incision sites.    No Known Allergies     Social History     Socioeconomic History   • Marital status: Single   Tobacco Use   • Smoking status: Former Smoker     Packs/day: 0.50     Years: 6.00     Pack years: 3.00     Types: Cigarettes     Start date: 10/19/2010     Quit date: 10/19/2016     Years since quittin.3   • Smokeless tobacco: Never Used   Vaping Use   • Vaping Use: Never used   Substance and Sexual Activity   • Alcohol use: Not Currently     Comment: LAST DRINK 2021   • Drug use: Never   • Sexual activity: Defer        REVIEW OF SYSTEMS    Constitutional: Denies fevers, chills, weight loss  Cardiovascular: Denies chest pain, shortness of breath  Skin: Denies rashes, acute skin changes  Neurologic: Denies headache, loss of consciousness  MSK: Left knee pain      Objective   Vital Signs:   Pulse 111   Ht 167.6 cm (66\")   Wt 112 kg (246 lb)   SpO2 96%   BMI 39.71 kg/m²     Body mass index is 39.71 kg/m².    Physical Exam    Left knee: Incisions are healing " well, sutures were removed today.  No swelling, no erythema, no drainage, no effusion, no signs of infection.  Full extension of the knee, flexion 90, nontender calf, negative Homans' sign, resolving ecchymosis to the distal shin.  Neurovascular intact.    Procedures    Imaging Results (Most Recent)     None           Result Review :   The following data was reviewed by: AMY Marino on 03/08/2022:               Assessment and Plan    Diagnoses and all orders for this visit:    1. Aftercare following surgery of left knee arthroscopic ACL reconstruction with allograft, 2/21/2022 (Primary)        Discussed diagnosis and treatment options with the patient she was advised to continue brace use and crutches use she may wean off the crutches over the next few weeks with therapy guidance.  Keep brace locked in extension 0 degrees and flexion 100 degrees when at rest she may unlock it..  If any new or concerning symptoms occur follow-up sooner otherwise follow-up in 4 weeks for reevaluation, patient agrees.    Call or return if worsening symptoms.    Follow Up   Return in about 4 weeks (around 4/5/2022).  Patient was given instructions and counseling regarding her condition or for health maintenance advice. Please see specific information pulled into the AVS if appropriate.

## 2022-03-14 ENCOUNTER — OFFICE VISIT (OUTPATIENT)
Dept: FAMILY MEDICINE CLINIC | Facility: CLINIC | Age: 27
End: 2022-03-14

## 2022-03-14 ENCOUNTER — TELEPHONE (OUTPATIENT)
Dept: PHYSICAL THERAPY | Facility: OTHER | Age: 27
End: 2022-03-14

## 2022-03-14 ENCOUNTER — TELEPHONE (OUTPATIENT)
Dept: FAMILY MEDICINE CLINIC | Facility: CLINIC | Age: 27
End: 2022-03-14

## 2022-03-14 VITALS
DIASTOLIC BLOOD PRESSURE: 88 MMHG | RESPIRATION RATE: 18 BRPM | WEIGHT: 254.9 LBS | BODY MASS INDEX: 40.97 KG/M2 | OXYGEN SATURATION: 98 % | TEMPERATURE: 97.2 F | SYSTOLIC BLOOD PRESSURE: 129 MMHG | HEIGHT: 66 IN | HEART RATE: 97 BPM

## 2022-03-14 DIAGNOSIS — F41.1 GENERALIZED ANXIETY DISORDER: ICD-10-CM

## 2022-03-14 DIAGNOSIS — F33.41 RECURRENT MAJOR DEPRESSIVE DISORDER, IN PARTIAL REMISSION: ICD-10-CM

## 2022-03-14 DIAGNOSIS — Z87.440 PERSONAL HISTORY UTI: Primary | ICD-10-CM

## 2022-03-14 DIAGNOSIS — F10.21 ALCOHOLISM IN REMISSION: ICD-10-CM

## 2022-03-14 DIAGNOSIS — J11.1 FLU: ICD-10-CM

## 2022-03-14 LAB
BILIRUB BLD-MCNC: NEGATIVE MG/DL
CLARITY, POC: CLEAR
COLOR UR: YELLOW
EXPIRATION DATE: ABNORMAL
EXPIRATION DATE: NORMAL
FLUAV RNA RESP QL NAA+PROBE: POSITIVE
FLUBV RNA RESP QL NAA+PROBE: NEGATIVE
GLUCOSE UR STRIP-MCNC: NEGATIVE MG/DL
INTERNAL CONTROL: ABNORMAL
KETONES UR QL: NEGATIVE
LEUKOCYTE EST, POC: NEGATIVE
Lab: ABNORMAL
Lab: NORMAL
NITRITE UR-MCNC: NEGATIVE MG/ML
PH UR: 5.5 [PH] (ref 5–8)
PROT UR STRIP-MCNC: NEGATIVE MG/DL
RBC # UR STRIP: NEGATIVE /UL
SP GR UR: 1.03 (ref 1–1.03)
UROBILINOGEN UR QL: NORMAL

## 2022-03-14 PROCEDURE — 87502 INFLUENZA DNA AMP PROBE: CPT | Performed by: NURSE PRACTITIONER

## 2022-03-14 PROCEDURE — 81003 URINALYSIS AUTO W/O SCOPE: CPT | Performed by: NURSE PRACTITIONER

## 2022-03-14 PROCEDURE — 99214 OFFICE O/P EST MOD 30 MIN: CPT | Performed by: NURSE PRACTITIONER

## 2022-03-14 RX ORDER — BROMPHENIRAMINE MALEATE, PSEUDOEPHEDRINE HYDROCHLORIDE, AND DEXTROMETHORPHAN HYDROBROMIDE 2; 30; 10 MG/5ML; MG/5ML; MG/5ML
5 SYRUP ORAL 4 TIMES DAILY PRN
Qty: 120 ML | Refills: 0 | OUTPATIENT
Start: 2022-03-14 | End: 2022-04-15

## 2022-03-14 NOTE — PROGRESS NOTES
"Chief Complaint  Urinary Tract Infection (Follow Up)    Subjective      History of Present Illness  Zhane Traylor presents to [facility]     F/u from UTI to verify resolution. She was having low back pain after she had ACL repair. \"I thought I was going crazy.\" She went to the ER for chest pain and shortness of breath. She was having constipation at the same time from the percocet. She started keflex and symptoms resolved.    She has been having sinus congestion and cough for about 3 days. Denies fever. She had covid in January. She was immunized. She feels like it's turning into a sinus infection.     She had ACL repair about 3 weeks ago. She is doing well overall. She is in a brace for another 3 months.     Alcohol dependence, she relapsed a couple of months ago for 2 weeks. She doesn't know what triggered it. She is going to meetings again. She is living with her mom still. Her daughter is 4 now.     Zhane Traylor  reports that she quit smoking about 5 years ago. Her smoking use included cigarettes. She started smoking about 11 years ago. She has a 3.00 pack-year smoking history. She has never used smokeless tobacco.         Allergies  Patient has no known allergies.    Objective     Vitals:    03/14/22 0829   BP: 129/88   Pulse: 97   Resp: 18   Temp: 97.2 °F (36.2 °C)   SpO2: 98%     Body mass index is 41.14 kg/m².     Review of Systems    Physical Exam  Vitals reviewed.   Constitutional:       Appearance: Normal appearance. She is well-developed.   HENT:      Head: Normocephalic and atraumatic.   Cardiovascular:      Rate and Rhythm: Normal rate and regular rhythm.      Heart sounds: Normal heart sounds. No murmur heard.  Pulmonary:      Effort: Pulmonary effort is normal.      Breath sounds: Normal breath sounds.   Neurological:      Mental Status: She is alert and oriented to person, place, and time.      Cranial Nerves: No cranial nerve deficit.      Motor: No weakness. "   Psychiatric:         Mood and Affect: Mood and affect normal.              Result Review :    The following data was reviewed by: CLEO Bright on 03/14/2022:    Depression: At risk   • PHQ-2 Score: 12       Common labs    Common Labsle 9/30/21 9/30/21 9/30/21 9/30/21 3/2/22 3/2/22 3/7/22 3/7/22    1237 1237 1237 1237 0927 0927 1422 1422   Glucose    79  101 (A)  88   BUN    17  22 (A)  12   Creatinine    0.90  1.19 (A)  1.10 (A)   eGFR Non African Am    76       Sodium    139  135 (A)  137   Potassium    4.2  3.6  4.0   Chloride    105  101  102   Calcium    9.6  9.6  9.8   Albumin    4.20  4.20  4.40   Total Bilirubin    0.3  0.3  0.3   Alkaline Phosphatase    86  81  91   AST (SGOT)    15  16  17   ALT (SGPT)    13  16  17   WBC 8.51    8.13  5.64    Hemoglobin 13.3    13.6  14.1    Hematocrit 40.4    42.8  44.3    Platelets 313    287  280    Total Cholesterol   199        Triglycerides   59        HDL Cholesterol   55        LDL Cholesterol    133 (A)        Hemoglobin A1C  5.67 (A)         (A) Abnormal value                            Assessment and Plan     Diagnoses and all orders for this visit:    1. Personal history UTI (Primary)  Comments:  UTI appears to have resolved completely.   Orders:  -     POCT urinalysis dipstick, automated    2. Flu  Comments:  symptomatic treatment. bromfed. Beyond window for tamiflu.   Orders:  -     brompheniramine-pseudoephedrine-DM 30-2-10 MG/5ML syrup; Take 5 mL by mouth 4 (Four) Times a Day As Needed for Congestion or Cough.  Dispense: 120 mL; Refill: 0  -     POCT Flu A&B, Molecular    3. Alcoholism in remission (HCC)  Comments:  great job getting back into meetings, continue therapy and psych.    4. Generalized anxiety disorder  Comments:  well controlled wiht current medications.     5. Recurrent major depressive disorder, in partial remission (HCC)  Comments:  doing well, she is seeing Sammie parker.     UTI has resolved.   Transmission precautions  discussed, OTC medications discussed, discussed when to seek emergency treatment.  Call with worsening of symptoms  Push fluids, call with worsening.         Follow Up     No follow-ups on file.    Patient was given instructions and counseling regarding her condition or for health maintenance advice. Please see specific information pulled into the AVS if appropriate.     Avelina Mims, APRN

## 2022-03-14 NOTE — TELEPHONE ENCOUNTER
Pt was contacted by phone in response to her request that PCP order Tamiflu. Pt was notified that PCP stated that she was past the 48 hour window for Tamiflu and pt was also notified that PCP called in Bromfed for her to be picked up at the pharmacy. Pt verbalized understanding and said she had already picked up the Bromfed.

## 2022-03-14 NOTE — TELEPHONE ENCOUNTER
Did you call her about this? She is beyond the 48 hour window for treatment with tamiflu but I did send in bromfed for her.

## 2022-03-17 LAB
QT INTERVAL: 362 MS
QT INTERVAL: 366 MS

## 2022-03-18 ENCOUNTER — TREATMENT (OUTPATIENT)
Dept: PHYSICAL THERAPY | Facility: CLINIC | Age: 27
End: 2022-03-18

## 2022-03-18 DIAGNOSIS — R29.898 WEAKNESS OF LEFT LOWER EXTREMITY: ICD-10-CM

## 2022-03-18 DIAGNOSIS — S83.512A RUPTURE OF ANTERIOR CRUCIATE LIGAMENT OF LEFT KNEE, INITIAL ENCOUNTER: Primary | ICD-10-CM

## 2022-03-18 DIAGNOSIS — M25.562 LEFT KNEE PAIN, UNSPECIFIED CHRONICITY: ICD-10-CM

## 2022-03-18 DIAGNOSIS — R26.9 GAIT DISTURBANCE: ICD-10-CM

## 2022-03-18 DIAGNOSIS — M25.662 DECREASED RANGE OF MOTION (ROM) OF LEFT KNEE: ICD-10-CM

## 2022-03-18 PROCEDURE — 97140 MANUAL THERAPY 1/> REGIONS: CPT | Performed by: PHYSICAL THERAPIST

## 2022-03-18 PROCEDURE — 97110 THERAPEUTIC EXERCISES: CPT | Performed by: PHYSICAL THERAPIST

## 2022-03-18 NOTE — PROGRESS NOTES
Physical Therapy Daily Treatment Note      Patient: Zhane Traylor   : 1995  Referring practitioner: Troy Hsu MD  Date of Initial Visit: Type: THERAPY  Noted: 2022  Today's Date: 3/18/2022  Patient seen for 3 sessions           Subjective Questionnaire:       Subjective     Objective   See Exercise, Manual, and Modality Logs for complete treatment.       Assessment/Plan    Visit Diagnoses:    ICD-10-CM ICD-9-CM   1. Rupture of anterior cruciate ligament of left knee, initial encounter  S83.512A 844.2   2. Gait disturbance  R26.9 781.2   3. Left knee pain, unspecified chronicity  M25.562 719.46   4. Weakness of left lower extremity  R29.898 729.89   5. Decreased range of motion (ROM) of left knee  M25.662 719.56       Progress per Plan of Care and Progress strengthening /stabilization /functional activity           Timed:  Manual Therapy:    8     mins  71030;  Therapeutic Exercise:    20     mins  59258;     Neuromuscular Lance:        mins  19515;    Therapeutic Activity:          mins  18063;     Gait Training:           mins  12053;     Ultrasound:          mins  96143;    Electrical Stimulation:         mins  33472 ( );  Aquatic Therapy          mins  98024    Untimed:  Electrical Stimulation:         mins  10432 ( );  Mechanical Traction:         mins  84974;     Timed Treatment:   28   mins   Total Treatment:     28   mins    Electronically signed    Bridgette Hightower PTA  Physical Therapist Assistant    PAUL license: [id number]

## 2022-03-24 ENCOUNTER — TREATMENT (OUTPATIENT)
Dept: PHYSICAL THERAPY | Facility: CLINIC | Age: 27
End: 2022-03-24

## 2022-03-24 DIAGNOSIS — R29.898 WEAKNESS OF LEFT LOWER EXTREMITY: ICD-10-CM

## 2022-03-24 DIAGNOSIS — M25.562 LEFT KNEE PAIN, UNSPECIFIED CHRONICITY: ICD-10-CM

## 2022-03-24 DIAGNOSIS — S83.512A RUPTURE OF ANTERIOR CRUCIATE LIGAMENT OF LEFT KNEE, INITIAL ENCOUNTER: Primary | ICD-10-CM

## 2022-03-24 DIAGNOSIS — R26.9 GAIT DISTURBANCE: ICD-10-CM

## 2022-03-24 DIAGNOSIS — M25.662 DECREASED RANGE OF MOTION (ROM) OF LEFT KNEE: ICD-10-CM

## 2022-03-24 PROCEDURE — 97530 THERAPEUTIC ACTIVITIES: CPT | Performed by: PHYSICAL THERAPIST

## 2022-03-24 PROCEDURE — 97110 THERAPEUTIC EXERCISES: CPT | Performed by: PHYSICAL THERAPIST

## 2022-03-24 NOTE — PROGRESS NOTES
Progress Assessment        Patient: Zhane Traylor   : 1995  Diagnosis/ICD-10 Code:  Rupture of anterior cruciate ligament of left knee, initial encounter [S83.512A]  Referring practitioner: Troy Hsu MD  Date of Initial Visit: Type: THERAPY  Noted: 2022  Today's Date: 3/24/2022  Patient seen for 4 sessions      Subjective:     Subjective Questionnaire: LEFS:   Clinical Progress: improved  Home Program Compliance: Yes  Treatment has included: therapeutic exercise, neuromuscular re-education, manual therapy and therapeutic activity    Subjective Evaluation    History of Present Illness  Mechanism of injury: Pt presents today reporting minimal knee pain overall. She has been doing her exercises at home, just still notices she is very weak. She does report not needing the crutches most of the time. She does report compliance wearing her brace with walking.    Pain  Current pain ratin         Objective     Active Range of Motion   Left Knee   Flexion: 118 degrees   Extensor la degrees      Passive Range of Motion   Left Knee   Flexion: 120 degrees   Extension: 0 degrees      Strength/Myotome Testing      Left Knee   Quadriceps contraction: good    Additional Strength Details  Left knee not tested secondary to orthopedic precautions/recent surgery        Ambulation      Comments   Patient using bilateral crutches/left knee brace locked in extension for ambulation.  Patient demonstrating WBAT through left LE.       Assessment & Plan     Assessment  Impairments: abnormal gait, abnormal or restricted ROM, activity intolerance, impaired physical strength, lacks appropriate home exercise program, pain with function and weight-bearing intolerance  Functional Limitations: sleeping, walking, uncomfortable because of pain, standing, stooping and unable to perform repetitive tasks  Assessment details: Pt presents s/p Left ACL repair with allograft on 22, she is progressing well, meeting two  of her goals, with improved quad strength, demonstrates good SLR with 0 degree quad lag today, but still requires cueing for correction. She is now ambulating WBAT with her crutches and brace, progressed standing exercises today with good tolerance. Pt has progressed well with ROM, she is expected to continue to progress with ongoing PT for further improving strength, ROM, and functional capacity.  Prognosis: good    Goals  Plan Goals: 1. The patient has limited ROM of the left knee.   LTG 1: 12weeks:  The patient will demonstrate 0 to 125 degrees of ROM for the left knee in order to allow patient to normalize gait/stair negotiation.   STATUS:  Not met, progressing  STG 1a: 6 weeks:  The patient will demonstrate 0 to 120 degrees of ROM for the left knee.   STATUS:  MET     2. The patient has limited strength of the left knee.   LTG 2: 12 weeks: The patient will demonstrate 4+/5 strength for left knee flexion and extension in order to allow patient improved joint stability.   STATUS:  Not met, progressing  STG 2a: 6 weeks: The patient will demonstrate 4/5 strength for left knee flexion and extension   STATUS:  Not met, progressing     3. The patient has gait dysfunction.   LTG 3: 6 weeks:  The patient will ambulate without assistive device, independently, for community distances with minimal limp to the left lower extremity in order to improve mobility and allow patient to perform activities such as grocery shopping with greater ease.   STATUS:  Not met, progressing  STG 3a: The patient will be independent in HEP.   STATUS:  MET, still ongoing/updating     4. The patient complains of left knee pain.   LTG 4: 12 weeks:  The patient will report a pain rating of 1/10 or better in order to improve   tolerance to activities of daily living and improve sleep quality.   STATUS:  Not met, progressing  STG 4a: 6 weeks:  The patient will report a pain rating of 1/10 or better.   STATUS:  Not met, progressing     5. Mobility:  Walking/Moving Around Functional Limitation     LTG 5: 12weeks:  The patient will demonstrate 25% limitation by achieving a score of 60/80 on the Lower Extremity Functional Scale.   STATUS:  Not met, progressing  STG 5a: 6 weeks:  The patient will demonstrate 37% limitation by achieving a score of 50/80 on the Lower Extremity Functional Scale.     STATUS:  Not met, progressing    TREATMENT: Manual therapy, gait training, neuromuscular re-education, therapeutic exercise, home exercise instruction, and modalities as needed to include:  moist heat, electrical stimulation, ultrasound, and ice.       Plan  Therapy options: will be seen for skilled therapy services  Planned modality interventions: cryotherapy and TENS  Planned therapy interventions: manual therapy, soft tissue mobilization, strengthening, stretching, therapeutic activities, home exercise program, gait training, functional ROM exercises, flexibility and balance/weight-bearing training  Frequency: 2x week  Duration in weeks: 12  Treatment plan discussed with: patient      Progress toward previous goals: Partially Met    See Exercise, Manual, and Modality Logs for complete treatment.         Recommendations: Continue as planned  Timeframe: 2 months  Prognosis to achieve goals: good    PT Signature: Lit Chopra PT    Electronically signed 3/24/2022    [place] License: PT - [id number]     Based upon review of the patient's progress and continued therapy plan, it is my medical opinion that Zhane Traylor should continue physical therapy treatment at [facility]  [address] [place] [address] [phone].      Timed:         Manual Therapy:    0     mins  84620;     Therapeutic Exercise:    25     mins  32741;     Neuromuscular Lance:    0    mins  94557;    Therapeutic Activity:     10     mins  57800;     Gait Trainin     mins  82910;     Ultrasound:     0     mins  63133;    Ionto                                0    mins   70358  Self Care                       0     mins   25211  Aquatic                          0     mins 23388          Timed Treatment:   35   mins   Total Treatment:     35   mins      I certify that the therapy services are furnished while this patient is under my care.  The services outlined above are required by this patient, and will be reviewed every 90 days.

## 2022-03-28 ENCOUNTER — TREATMENT (OUTPATIENT)
Dept: PHYSICAL THERAPY | Facility: CLINIC | Age: 27
End: 2022-03-28

## 2022-03-28 DIAGNOSIS — R29.898 WEAKNESS OF LEFT LOWER EXTREMITY: ICD-10-CM

## 2022-03-28 DIAGNOSIS — S83.512A RUPTURE OF ANTERIOR CRUCIATE LIGAMENT OF LEFT KNEE, INITIAL ENCOUNTER: Primary | ICD-10-CM

## 2022-03-28 DIAGNOSIS — M25.562 LEFT KNEE PAIN, UNSPECIFIED CHRONICITY: ICD-10-CM

## 2022-03-28 DIAGNOSIS — M25.662 DECREASED RANGE OF MOTION (ROM) OF LEFT KNEE: ICD-10-CM

## 2022-03-28 DIAGNOSIS — R26.9 GAIT DISTURBANCE: ICD-10-CM

## 2022-03-28 PROCEDURE — 97530 THERAPEUTIC ACTIVITIES: CPT | Performed by: PHYSICAL THERAPIST

## 2022-03-28 PROCEDURE — 97110 THERAPEUTIC EXERCISES: CPT | Performed by: PHYSICAL THERAPIST

## 2022-03-28 NOTE — PROGRESS NOTES
Physical Therapy Daily Progress Note        Patient: Zhane Traylor   : 1995  Diagnosis/ICD-10 Code:  Rupture of anterior cruciate ligament of left knee, initial encounter [S83.512A]  Referring practitioner: Troy Hsu MD  Date of Initial Visit: Type: THERAPY  Noted: 2022  Today's Date: 3/28/2022  Patient seen for 5 sessions             Subjective   Zhane Traylor reports: not having much pain in her knee, she is still wearing the brace even to sleep.     Objective   No complaints of increased pain or discomfort.     See Exercise, Manual, and Modality Logs for complete treatment.     Assessment/Plan  Zhane progressing as evident by decreased overall knee pain. Pt tolerated exercises well, no complaints of increased pain or discomfort. Pt would benefit from skilled PT to address Range of Motion  and Strength deficits with surgical protocol, pain management and any concerns with ADLs.     Progress per Plan of Care           Timed:  Manual Therapy:         mins  40692;  Therapeutic Exercise:    20     mins  68134;     Neuromuscular Lance:        mins  74269;    Therapeutic Activity:     10     mins  62212;     Gait Training:           mins  33817;    Aquatic Therapy:          mins  22718;       Untimed:  Electrical Stimulation:         mins  40515 ( );  Mechanical Traction:         mins  47815;       Timed Treatment:   30   mins   Total Treatment:     30   mins      Electronically signed:   Sara Chopra PTA  Physical Therapist Assistant  [place] License #: [id number]

## 2022-03-31 ENCOUNTER — TREATMENT (OUTPATIENT)
Dept: PHYSICAL THERAPY | Facility: CLINIC | Age: 27
End: 2022-03-31

## 2022-03-31 DIAGNOSIS — R26.9 GAIT DISTURBANCE: ICD-10-CM

## 2022-03-31 DIAGNOSIS — M25.562 LEFT KNEE PAIN, UNSPECIFIED CHRONICITY: ICD-10-CM

## 2022-03-31 DIAGNOSIS — R29.898 WEAKNESS OF LEFT LOWER EXTREMITY: ICD-10-CM

## 2022-03-31 DIAGNOSIS — M25.662 DECREASED RANGE OF MOTION (ROM) OF LEFT KNEE: ICD-10-CM

## 2022-03-31 DIAGNOSIS — S83.512A RUPTURE OF ANTERIOR CRUCIATE LIGAMENT OF LEFT KNEE, INITIAL ENCOUNTER: Primary | ICD-10-CM

## 2022-03-31 PROCEDURE — 97110 THERAPEUTIC EXERCISES: CPT | Performed by: PHYSICAL THERAPIST

## 2022-03-31 NOTE — PROGRESS NOTES
"Physical Therapy Daily Treatment Note      Patient: Zhane Traylor   : 1995  Referring practitioner: Troy Hsu MD  Date of Initial Visit: Type: THERAPY  Noted: 2022  Today's Date: 3/31/2022  Patient seen for 6 sessions           Subjective  Zhane Traylor reports: she feel Tuesday night walking in her room. \"I didn't have my brace on, getting out of the shower and I tripped on the humidifier and fell on it.\" When she was asked about any increase in knee pain, Zhane stated \"I don't know if it affected the knee because of the pain and contusion at the injury site. Have a big bruise and swelling.\" \"I hadn't had a lot of swelling and now after the fall I do have more.\"       Objective   Pt arrived with her brace on and an ace wrap surrounding her knee underneath her pants. She noted some discomfort posteromedial L knee during hip ABD.   See Exercise, Manual, and Modality Logs for complete treatment.       Assessment/Plan   Treatment toned down due to her fall, easier session. No worsening in c/o post session. Continued need for skilled care as outlined.  Visit Diagnoses:    ICD-10-CM ICD-9-CM   1. Rupture of anterior cruciate ligament of left knee, initial encounter  S83.512A 844.2   2. Left knee pain, unspecified chronicity  M25.562 719.46   3. Gait disturbance  R26.9 781.2   4. Weakness of left lower extremity  R29.898 729.89   5. Decreased range of motion (ROM) of left knee  M25.662 719.56       Progress per Plan of Care and Progress strengthening /stabilization /functional activity           Timed:  Manual Therapy:         mins  42124;  Therapeutic Exercise:    31     mins  33596;     Neuromuscular Lance:        mins  18238;    Therapeutic Activity:          mins  11054;     Gait Training:           mins  11904;     Ultrasound:          mins  65823;    Electrical Stimulation:         mins  76911 ( );  Aquatics  __   mins   91303    Untimed:  Electrical Stimulation:         mins  40783 ( " );  Mechanical Traction:         mins  57002;     Timed Treatment:   31   mins   Total Treatment:     31   mins    Electronically Signed:  Renetta Johns PTA  Physical Therapist Assistant    THOM[place]ATT license [id number]

## 2022-04-05 ENCOUNTER — TREATMENT (OUTPATIENT)
Dept: PHYSICAL THERAPY | Facility: CLINIC | Age: 27
End: 2022-04-05

## 2022-04-05 DIAGNOSIS — M25.662 DECREASED RANGE OF MOTION (ROM) OF LEFT KNEE: ICD-10-CM

## 2022-04-05 DIAGNOSIS — M25.562 LEFT KNEE PAIN, UNSPECIFIED CHRONICITY: ICD-10-CM

## 2022-04-05 DIAGNOSIS — S83.512A RUPTURE OF ANTERIOR CRUCIATE LIGAMENT OF LEFT KNEE, INITIAL ENCOUNTER: Primary | ICD-10-CM

## 2022-04-05 DIAGNOSIS — R29.898 WEAKNESS OF LEFT LOWER EXTREMITY: ICD-10-CM

## 2022-04-05 DIAGNOSIS — R26.9 GAIT DISTURBANCE: ICD-10-CM

## 2022-04-05 PROCEDURE — 97110 THERAPEUTIC EXERCISES: CPT | Performed by: PHYSICAL THERAPIST

## 2022-04-05 NOTE — PROGRESS NOTES
Physical Therapy Daily Treatment Note      Patient: Zhane Traylor   : 1995  Referring practitioner: Troy Hsu MD  Date of Initial Visit: Type: THERAPY  Noted: 2022  Today's Date: 2022  Patient seen for 7 sessions           Subjective Questionnaire:       Subjective Evaluation    History of Present Illness    Subjective comment: Pt reports to  clinic with long leg brace and wrap around knee and reports she feels 3/10 pain and pressure in knee since she fell getting out of the shower.  Pt reports that before she fell she didn't feel anything with therapeutic exercise.  Pt reports she had weened the icing and wearing a wrap but has returned to that since fall about a week ago.Pain  Current pain rating: 3           Objective   See Exercise, Manual, and Modality Logs for complete treatment.       Assessment & Plan     Assessment    Assessment details: Pt reports feeling pressure and a twinge with therapeutic exercise which she didn't have before fall last week.        Visit Diagnoses:    ICD-10-CM ICD-9-CM   1. Rupture of anterior cruciate ligament of left knee, initial encounter  S83.512A 844.2   2. Left knee pain, unspecified chronicity  M25.562 719.46   3. Gait disturbance  R26.9 781.2   4. Weakness of left lower extremity  R29.898 729.89   5. Decreased range of motion (ROM) of left knee  M25.662 719.56       Progress per Plan of Care and Progress strengthening /stabilization /functional activity           Timed:  Manual Therapy:         mins  22382;  Therapeutic Exercise:    17     mins  39261;     Neuromuscular Lance:        mins  63343;    Therapeutic Activity:          mins  15806;     Gait Training:           mins  81725;     Ultrasound:          mins  61986;    Electrical Stimulation:         mins  40512 ( );  Aquatic Therapy          mins  78865    Untimed:  Electrical Stimulation:         mins  37656 ( );  Mechanical Traction:         mins  62260;     Timed Treatment:    17   mins   Total Treatment:     32   mins    Electronically signed    Bridgette Hightower PTA  Physical Therapist Assistant    PAUL license: [id number]

## 2022-04-07 ENCOUNTER — TREATMENT (OUTPATIENT)
Dept: PHYSICAL THERAPY | Facility: CLINIC | Age: 27
End: 2022-04-07

## 2022-04-07 DIAGNOSIS — R26.9 GAIT DISTURBANCE: ICD-10-CM

## 2022-04-07 DIAGNOSIS — M25.662 DECREASED RANGE OF MOTION (ROM) OF LEFT KNEE: ICD-10-CM

## 2022-04-07 DIAGNOSIS — M25.562 LEFT KNEE PAIN, UNSPECIFIED CHRONICITY: ICD-10-CM

## 2022-04-07 DIAGNOSIS — S83.512A RUPTURE OF ANTERIOR CRUCIATE LIGAMENT OF LEFT KNEE, INITIAL ENCOUNTER: Primary | ICD-10-CM

## 2022-04-07 DIAGNOSIS — R29.898 WEAKNESS OF LEFT LOWER EXTREMITY: ICD-10-CM

## 2022-04-07 PROCEDURE — 97110 THERAPEUTIC EXERCISES: CPT | Performed by: PHYSICAL THERAPIST

## 2022-04-07 PROCEDURE — 97530 THERAPEUTIC ACTIVITIES: CPT | Performed by: PHYSICAL THERAPIST

## 2022-04-07 NOTE — PROGRESS NOTES
Physical Therapy Daily Treatment Note      Patient: Zhane Traylor   : 1995  Referring practitioner: Troy Hsu MD  Date of Initial Visit: Type: THERAPY  Noted: 2022  Today's Date: 2022  Patient seen for 8 sessions           Subjective Questionnaire:       Subjective Evaluation    History of Present Illness    Subjective comment: Pt reports to clinic wearing long leg knee brace on L and has ace wrap around knee for stability since she fell.  Pt reports wrap helps with swelling and overall discomfort. Pt reports no L knee pain this morning (11:06 am) but did have pain last night before bed time 3/10.       Objective   See Exercise, Manual, and Modality Logs for complete treatment.       Assessment & Plan     Assessment    Assessment details: Pt tolerated progressed therapeutic exercise well today.  Pt reports continued edema and pain in L knee since she fell.  Continue with POC.        Visit Diagnoses:    ICD-10-CM ICD-9-CM   1. Rupture of anterior cruciate ligament of left knee, initial encounter  S83.512A 844.2   2. Left knee pain, unspecified chronicity  M25.562 719.46   3. Gait disturbance  R26.9 781.2   4. Weakness of left lower extremity  R29.898 729.89   5. Decreased range of motion (ROM) of left knee  M25.662 719.56       Progress per Plan of Care and Progress strengthening /stabilization /functional activity           Timed:  Manual Therapy:         mins  68598;  Therapeutic Exercise:    20     mins  31327;     Neuromuscular Lance:        mins  95839;    Therapeutic Activity:     10     mins  90550;     Gait Training:           mins  03652;     Ultrasound:          mins  28370;    Electrical Stimulation:         mins  89687 ( );  Aquatic Therapy          mins  85645    Untimed:  Electrical Stimulation:         mins  85364 ( );  Mechanical Traction:         mins  44945;     Timed Treatment:   30   mins   Total Treatment:     40   mins    Electronically signed    Bridgette  GIULIA Hightower  Physical Therapist Assistant    PAUL license: [id number]

## 2022-04-12 ENCOUNTER — OFFICE VISIT (OUTPATIENT)
Dept: ORTHOPEDIC SURGERY | Facility: CLINIC | Age: 27
End: 2022-04-12

## 2022-04-12 ENCOUNTER — TREATMENT (OUTPATIENT)
Dept: PHYSICAL THERAPY | Facility: CLINIC | Age: 27
End: 2022-04-12

## 2022-04-12 VITALS — BODY MASS INDEX: 41.37 KG/M2 | HEIGHT: 66 IN | HEART RATE: 117 BPM | WEIGHT: 257.4 LBS | OXYGEN SATURATION: 99 %

## 2022-04-12 DIAGNOSIS — R29.898 WEAKNESS OF LEFT LOWER EXTREMITY: ICD-10-CM

## 2022-04-12 DIAGNOSIS — S83.512A RUPTURE OF ANTERIOR CRUCIATE LIGAMENT OF LEFT KNEE, INITIAL ENCOUNTER: Primary | ICD-10-CM

## 2022-04-12 DIAGNOSIS — M25.662 DECREASED RANGE OF MOTION (ROM) OF LEFT KNEE: ICD-10-CM

## 2022-04-12 DIAGNOSIS — R26.9 GAIT DISTURBANCE: ICD-10-CM

## 2022-04-12 DIAGNOSIS — Z47.89 AFTERCARE FOLLOWING SURGERY OF THE MUSCULOSKELETAL SYSTEM: Primary | ICD-10-CM

## 2022-04-12 DIAGNOSIS — M25.562 LEFT KNEE PAIN, UNSPECIFIED CHRONICITY: ICD-10-CM

## 2022-04-12 PROCEDURE — 99024 POSTOP FOLLOW-UP VISIT: CPT | Performed by: PHYSICIAN ASSISTANT

## 2022-04-12 PROCEDURE — 97110 THERAPEUTIC EXERCISES: CPT | Performed by: PHYSICAL THERAPIST

## 2022-04-12 PROCEDURE — 97530 THERAPEUTIC ACTIVITIES: CPT | Performed by: PHYSICAL THERAPIST

## 2022-04-12 NOTE — PROGRESS NOTES
"Chief Complaint  Pain and Follow-up of the Left Knee    Subjective          Zhane Traylor is a 26 y.o. female  presents to [facility] for   History of Present Illness    Patient presents for 6-week postoperative evaluation of left knee arthroscopic ACL reconstruction with allograft, 2022.  Patient presents with her ACL brace she states that 2 weeks ago she was at home she got out of the shower she was walking over to get her knee brace back on and she states she tripped over a humidifier and fell and hit her left knee on the lateral knee.  Patient admits to some swelling and bruising of the left knee.  Patient states she had mild pain she denies instability, denies locking catching or buckling of the knee.  She states the swelling has come down some she states that she has been attending physical therapy ever since the fall she states that therapy has known about her fall and injury and she states that she has been able to tolerate all therapy exercises well and she states that therapy said she \"looked good \".  She states she is now doing stair steps at therapy and this does not cause any pain.  Patient has been compliant with her brace use.  Patient states pain is controlled and denies pain today.  No Known Allergies     Social History     Socioeconomic History   • Marital status: Single   Tobacco Use   • Smoking status: Former Smoker     Packs/day: 0.50     Years: 6.00     Pack years: 3.00     Types: Cigarettes     Start date: 10/19/2010     Quit date: 10/19/2016     Years since quittin.4   • Smokeless tobacco: Never Used   Vaping Use   • Vaping Use: Never used   Substance and Sexual Activity   • Alcohol use: Not Currently     Comment: LAST DRINK 2021   • Drug use: Never   • Sexual activity: Defer        REVIEW OF SYSTEMS    Constitutional: Denies fevers, chills, weight loss  Cardiovascular: Denies chest pain, shortness of breath  Skin: Denies rashes, acute skin " "changes  Neurologic: Denies headache, loss of consciousness  MSK: Left knee pain      Objective   Vital Signs:   Pulse 117   Ht 167.6 cm (66\")   Wt 117 kg (257 lb 6.4 oz)   SpO2 99%   BMI 41.55 kg/m²     Body mass index is 41.55 kg/m².    Physical Exam    Left knee: Well-healed incisions,, no erythema, no signs of infection surrounding the incision sites, nontender to palpation at incision sites.  The superior lateral knee has an area of resolving ecchymosis with mild tenderness to palpation.  Patient knee is stable to varus/valgus stress, stable anterior/posterior drawer.  Full extension, flexion 110, negative Lachman, patient able hold straight leg raise, 5 out of 5 strength.  Nontender calf, negative Homans' sign.    Procedures    Imaging Results (Most Recent)     None           Result Review :   The following data was reviewed by: AMY Marino on 04/12/2022:               Assessment and Plan    Diagnoses and all orders for this visit:    1. Aftercare following surgery of left knee arthroscopic ACL reconstruction with allograft, 2/21/2022 (Primary)        Discussed diagnosis and treatment options with the patient based on the patient history of a recent fall with bruising and swelling patient was advised that we could order MRI for further evaluation, patient states and denies that she has any instability, she denies locking or catching or buckling of the knee, she states she has tolerated all of her therapy visits well since the injury and she does not see the need for an MRI at this time.  Patient was advised she could call back if she changes her mind otherwise she will continue physical therapy she was advised to discontinue ACL brace she was put into a regular knee brace today with advisement to use with activities.  If any new or concerning symptoms occur follow-up sooner otherwise follow-up in 4 weeks for reevaluation, patient agreed.    Call or return if worsening symptoms.    Follow " Up   Return in about 4 weeks (around 5/10/2022) for Recheck.  Patient was given instructions and counseling regarding her condition or for health maintenance advice. Please see specific information pulled into the AVS if appropriate.

## 2022-04-12 NOTE — PROGRESS NOTES
Physical Therapy Daily Treatment Note      Patient: Zhane Traylor   : 1995  Referring practitioner: Troy Hsu MD  Date of Initial Visit: Type: THERAPY  Noted: 2022  Today's Date: 2022  Patient seen for 9 sessions           Subjective Questionnaire:       Subjective Evaluation    History of Present Illness    Subjective comment: Pt reports she just came from Maximino's office and was given a new short L knee brace.  Pt reports she has not been having any ore pain.  Pt reports Maximino told her the swelling       Objective   See Exercise, Manual, and Modality Logs for complete treatment.       Assessment & Plan     Assessment    Assessment details: Pt reported her LLE felt weak performing therapeutic exercise today and attributed it to riding recumbent bike for first time today.          Visit Diagnoses:    ICD-10-CM ICD-9-CM   1. Rupture of anterior cruciate ligament of left knee, initial encounter  S83.512A 844.2   2. Left knee pain, unspecified chronicity  M25.562 719.46   3. Gait disturbance  R26.9 781.2   4. Weakness of left lower extremity  R29.898 729.89   5. Decreased range of motion (ROM) of left knee  M25.662 719.56       Progress per Plan of Care and Progress strengthening /stabilization /functional activity           Timed:  Manual Therapy:         mins  33856;  Therapeutic Exercise:    17     mins  20269;     Neuromuscular Lance:        mins  47755;    Therapeutic Activity:     8     mins  35093;     Gait Training:           mins  71148;     Ultrasound:          mins  33483;    Electrical Stimulation:         mins  98326 ( );  Aquatic Therapy          mins  76750    Untimed:  Electrical Stimulation:         mins  46754 ( );  Mechanical Traction:         mins  56060;     Timed Treatment:   25   mins   Total Treatment:     40   mins    Electronically signed    Bridgette Hightower PTA  Physical Therapist Assistant    [place] license: [id number]

## 2022-04-14 ENCOUNTER — TREATMENT (OUTPATIENT)
Dept: PHYSICAL THERAPY | Facility: CLINIC | Age: 27
End: 2022-04-14

## 2022-04-14 DIAGNOSIS — M25.562 LEFT KNEE PAIN, UNSPECIFIED CHRONICITY: ICD-10-CM

## 2022-04-14 DIAGNOSIS — S83.512A RUPTURE OF ANTERIOR CRUCIATE LIGAMENT OF LEFT KNEE, INITIAL ENCOUNTER: Primary | ICD-10-CM

## 2022-04-14 DIAGNOSIS — R29.898 WEAKNESS OF LEFT LOWER EXTREMITY: ICD-10-CM

## 2022-04-14 DIAGNOSIS — M25.662 DECREASED RANGE OF MOTION (ROM) OF LEFT KNEE: ICD-10-CM

## 2022-04-14 DIAGNOSIS — R26.9 GAIT DISTURBANCE: ICD-10-CM

## 2022-04-14 PROCEDURE — 97110 THERAPEUTIC EXERCISES: CPT | Performed by: PHYSICAL THERAPIST

## 2022-04-14 PROCEDURE — 97530 THERAPEUTIC ACTIVITIES: CPT | Performed by: PHYSICAL THERAPIST

## 2022-04-14 NOTE — PROGRESS NOTES
Physical Therapy Daily Treatment Note      Patient: Zhane Traylor   : 1995  Referring practitioner: Troy Hsu MD  Date of Initial Visit: Type: THERAPY  Noted: 2022  Today's Date: 2022  Patient seen for 10 sessions           Subjective Questionnaire:       Subjective Evaluation    History of Present Illness    Subjective comment: Pt reports having no L knee pain and was not sore after last tx with new therapeutic exercise.       Objective   See Exercise, Manual, and Modality Logs for complete treatment.       Assessment & Plan     Assessment    Assessment details: Pt tolerated therapeutic exercise well and is progressing well.          Visit Diagnoses:    ICD-10-CM ICD-9-CM   1. Rupture of anterior cruciate ligament of left knee, initial encounter  S83.512A 844.2   2. Left knee pain, unspecified chronicity  M25.562 719.46   3. Gait disturbance  R26.9 781.2   4. Weakness of left lower extremity  R29.898 729.89   5. Decreased range of motion (ROM) of left knee  M25.662 719.56       Progress per Plan of Care and Progress strengthening /stabilization /functional activity           Timed:  Manual Therapy:         mins  54488;  Therapeutic Exercise:    21     mins  57195;     Neuromuscular Lance:        mins  76427;    Therapeutic Activity:     8     mins  87760;     Gait Training:           mins  34506;     Ultrasound:          mins  33898;    Electrical Stimulation:         mins  95578 ( );  Aquatic Therapy          mins  29424    Untimed:  Electrical Stimulation:         mins  40088 ( );  Mechanical Traction:         mins  47962;     Timed Treatment:   29   mins   Total Treatment:     29   mins    Electronically signed    Bridgette Hightower PTA  Physical Therapist Assistant    [place] license: [id number]

## 2022-04-15 PROCEDURE — 87086 URINE CULTURE/COLONY COUNT: CPT | Performed by: NURSE PRACTITIONER

## 2022-04-16 ENCOUNTER — TELEPHONE (OUTPATIENT)
Dept: URGENT CARE | Facility: CLINIC | Age: 27
End: 2022-04-16

## 2022-04-16 NOTE — TELEPHONE ENCOUNTER
----- Message from CLEO Baird sent at 4/16/2022  1:08 PM EDT -----  Please notify patient of urine culture showing a small amount of normal urogenital nara.  This result does not indicate an acute UTI.  Can stop Macrobid.  Recommend follow-up with PCP if symptoms worsen or persist.

## 2022-04-17 ENCOUNTER — TELEPHONE (OUTPATIENT)
Dept: URGENT CARE | Facility: CLINIC | Age: 27
End: 2022-04-17

## 2022-04-18 ENCOUNTER — TELEPHONE (OUTPATIENT)
Dept: URGENT CARE | Facility: CLINIC | Age: 27
End: 2022-04-18

## 2022-04-19 ENCOUNTER — TREATMENT (OUTPATIENT)
Dept: PHYSICAL THERAPY | Facility: CLINIC | Age: 27
End: 2022-04-19

## 2022-04-19 DIAGNOSIS — R26.9 GAIT DISTURBANCE: ICD-10-CM

## 2022-04-19 DIAGNOSIS — M25.562 LEFT KNEE PAIN, UNSPECIFIED CHRONICITY: ICD-10-CM

## 2022-04-19 DIAGNOSIS — R29.898 WEAKNESS OF LEFT LOWER EXTREMITY: ICD-10-CM

## 2022-04-19 DIAGNOSIS — S83.512A RUPTURE OF ANTERIOR CRUCIATE LIGAMENT OF LEFT KNEE, INITIAL ENCOUNTER: Primary | ICD-10-CM

## 2022-04-19 DIAGNOSIS — M25.662 DECREASED RANGE OF MOTION (ROM) OF LEFT KNEE: ICD-10-CM

## 2022-04-19 PROCEDURE — 97530 THERAPEUTIC ACTIVITIES: CPT | Performed by: PHYSICAL THERAPIST

## 2022-04-19 PROCEDURE — 97110 THERAPEUTIC EXERCISES: CPT | Performed by: PHYSICAL THERAPIST

## 2022-04-19 NOTE — PROGRESS NOTES
Physical Therapy Daily Treatment Note      Patient: Zhane Traylor   : 1995  Referring practitioner: Troy Hsu MD  Date of Initial Visit: Type: THERAPY  Noted: 2022  Today's Date: 2022  Patient seen for 11 sessions           Subjective Questionnaire:       Subjective Evaluation    History of Present Illness    Subjective comment: Pt reports no knee pain before therapeutic exercise.  Pt's gait is improving.       Objective   See Exercise, Manual, and Modality Logs for complete treatment.       Assessment & Plan     Assessment    Assessment details: Pt tolerated leg press well.  Attempted SLR with 3lb however pt exhibited lag and therefore went back to 2lb wt.  Pt tolerated other therapeutic exercise wll.        Visit Diagnoses:    ICD-10-CM ICD-9-CM   1. Rupture of anterior cruciate ligament of left knee, initial encounter  S83.512A 844.2   2. Left knee pain, unspecified chronicity  M25.562 719.46   3. Gait disturbance  R26.9 781.2   4. Weakness of left lower extremity  R29.898 729.89   5. Decreased range of motion (ROM) of left knee  M25.662 719.56       Progress per Plan of Care and Progress strengthening /stabilization /functional activity           Timed:  Manual Therapy:         mins  14790;  Therapeutic Exercise:    22     mins  74161;     Neuromuscular Lance:        mins  63518;    Therapeutic Activity:     8     mins  35570;     Gait Training:           mins  49328;     Ultrasound:          mins  13377;    Electrical Stimulation:         mins  89162 ( );  Aquatic Therapy          mins  59479    Untimed:  Electrical Stimulation:         mins  22339 ( );  Mechanical Traction:         mins  18885;     Timed Treatment:   30   mins   Total Treatment:     30   mins    Electronically signed    Bridgette Hightower PTA  Physical Therapist Assistant    [place]UL license: [id number]

## 2022-04-21 ENCOUNTER — TREATMENT (OUTPATIENT)
Dept: PHYSICAL THERAPY | Facility: CLINIC | Age: 27
End: 2022-04-21

## 2022-04-21 DIAGNOSIS — S83.512A RUPTURE OF ANTERIOR CRUCIATE LIGAMENT OF LEFT KNEE, INITIAL ENCOUNTER: Primary | ICD-10-CM

## 2022-04-21 DIAGNOSIS — M25.662 DECREASED RANGE OF MOTION (ROM) OF LEFT KNEE: ICD-10-CM

## 2022-04-21 DIAGNOSIS — R29.898 WEAKNESS OF LEFT LOWER EXTREMITY: ICD-10-CM

## 2022-04-21 DIAGNOSIS — M25.562 LEFT KNEE PAIN, UNSPECIFIED CHRONICITY: ICD-10-CM

## 2022-04-21 DIAGNOSIS — R26.9 GAIT DISTURBANCE: ICD-10-CM

## 2022-04-21 PROCEDURE — 97110 THERAPEUTIC EXERCISES: CPT | Performed by: PHYSICAL THERAPIST

## 2022-04-21 PROCEDURE — 97530 THERAPEUTIC ACTIVITIES: CPT | Performed by: PHYSICAL THERAPIST

## 2022-04-21 NOTE — PROGRESS NOTES
Progress Assessment        Patient: Zhane Traylor   : 1995  Diagnosis/ICD-10 Code:  Rupture of anterior cruciate ligament of left knee, initial encounter [S83.512A]  Referring practitioner: Troy Hsu MD  Date of Initial Visit: Type: THERAPY  Noted: 2022  Today's Date: 2022  Patient seen for 12 sessions      Subjective:     Subjective Questionnaire: LEFS: 56/80   Clinical Progress: improved  Home Program Compliance: Yes  Treatment has included: therapeutic exercise, neuromuscular re-education, manual therapy, therapeutic activity and gait training    Subjective Evaluation    History of Present Illness  Mechanism of injury: Pt reporting she is doing well, denies having too much pain or instability with exercises. She is able to ascend her stairs reciprocally at home, however, still using her RLE to control descending. She wears her brace for longer walking or more challenging exercises. She relates no problems from a fall she had about 3 weeks ago, she has been seen by Ortho since this happened.     Pain  Current pain ratin  At worst pain ratin         Objective     Active Range of Motion   Left Knee   Flexion: 132 degrees   Extensor la degrees           Strength/Myotome Testing      Left Knee   Quadriceps contraction: good    Additional Strength Details  Knee flexion 4/5  Knee extension 4/5        Ambulation      Comments   Patient ambulates without assistive device, using brace for longer distances/more challenging exercises          Assessment & Plan     Assessment  Impairments: abnormal gait, abnormal or restricted ROM, activity intolerance, impaired physical strength, lacks appropriate home exercise program, pain with function and weight-bearing intolerance  Functional Limitations: sleeping, walking, uncomfortable because of pain, standing, stooping and unable to perform repetitive tasks  Assessment details: Pt presents s/p Left ACL repair with allograft on 22, she is  progressing well, and has now met her short term strength and functional questionnaire goals, additionally has met her long term ROM goal. She is tolerating progression of functional strength well. At this time, most limited due to strength with exercises and functional mobility. She is expected to continue to progress with ongoing PT for further improving strength, ROM, and functional capacity.  Prognosis: good    Goals  Plan Goals: 1. The patient has limited ROM of the left knee.   LTG 1: 12weeks:  The patient will demonstrate 0 to 125 degrees of ROM for the left knee in order to allow patient to normalize gait/stair negotiation.   STATUS:  MET  STG 1a: 6 weeks:  The patient will demonstrate 0 to 120 degrees of ROM for the left knee.   STATUS:  MET     2. The patient has limited strength of the left knee.   LTG 2: 12 weeks: The patient will demonstrate 4+/5 strength for left knee flexion and extension in order to allow patient improved joint stability.   STATUS:  Not met, progressing  STG 2a: 6 weeks: The patient will demonstrate 4/5 strength for left knee flexion and extension   STATUS:  MET     3. The patient has gait dysfunction.   LTG 3: 6 weeks:  The patient will ambulate without assistive device, independently, for community distances with minimal limp to the left lower extremity in order to improve mobility and allow patient to perform activities such as grocery shopping with greater ease.   STATUS:  Not met, progressing (uses brace for long distances)  STG 3a: The patient will be independent in HEP.   STATUS:  MET, still ongoing/updating     4. The patient complains of left knee pain.   LTG 4: 12 weeks:  The patient will report a pain rating of 1/10 or better in order to improve   tolerance to activities of daily living and improve sleep quality.   STATUS:  Not met, progressing  STG 4a: 6 weeks:  The patient will report a pain rating of 1/10 or better.   STATUS:  MET     5. Mobility: Walking/Moving Around  Functional Limitation     LTG 5: 12weeks:  The patient will demonstrate 25% limitation by achieving a score of 60/80 on the Lower Extremity Functional Scale.   STATUS:  Not met, progressing  STG 5a: 6 weeks:  The patient will demonstrate 37% limitation by achieving a score of 50/80 on the Lower Extremity Functional Scale.     STATUS:  MET    TREATMENT: Manual therapy, gait training, neuromuscular re-education, therapeutic exercise, home exercise instruction, and modalities as needed to include:  moist heat, electrical stimulation, ultrasound, and ice.       Plan  Therapy options: will be seen for skilled therapy services  Planned modality interventions: cryotherapy and TENS  Planned therapy interventions: manual therapy, soft tissue mobilization, strengthening, stretching, therapeutic activities, home exercise program, gait training, functional ROM exercises, flexibility and balance/weight-bearing training  Frequency: 2x week  Duration in weeks: 12  Treatment plan discussed with: patient      Progress toward previous goals: Partially Met    See Exercise, Manual, and Modality Logs for complete treatment.         Recommendations: Continue as planned  Timeframe: 1 month  Prognosis to achieve goals: good    PT Signature: Lit Chopra PT    Electronically signed 2022    [place] License: PT - [id number]     Based upon review of the patient's progress and continued therapy plan, it is my medical opinion that Zhane Traylor should continue physical therapy treatment at [facility]  [address] [place] [place] [address] [phone].      Timed:         Manual Therapy:    0     mins  96211;     Therapeutic Exercise:    24     mins  17738;     Neuromuscular Lance:    0    mins  30754;    Therapeutic Activity:     8     mins  47674;     Gait Trainin     mins  49396;     Ultrasound:     0     mins  66281;    Ionto                               0    mins   58882  Self Care                        0     mins   28879  Aquatic                          0     mins 54242            Timed Treatment:   32   mins   Total Treatment:     32   mins      I certify that the therapy services are furnished while this patient is under my care.  The services outlined above are required by this patient, and will be reviewed every 90 days.

## 2022-04-26 ENCOUNTER — TREATMENT (OUTPATIENT)
Dept: PHYSICAL THERAPY | Facility: CLINIC | Age: 27
End: 2022-04-26

## 2022-04-26 DIAGNOSIS — R29.898 WEAKNESS OF LEFT LOWER EXTREMITY: ICD-10-CM

## 2022-04-26 DIAGNOSIS — M25.562 LEFT KNEE PAIN, UNSPECIFIED CHRONICITY: ICD-10-CM

## 2022-04-26 DIAGNOSIS — M25.662 DECREASED RANGE OF MOTION (ROM) OF LEFT KNEE: ICD-10-CM

## 2022-04-26 DIAGNOSIS — R26.9 GAIT DISTURBANCE: ICD-10-CM

## 2022-04-26 DIAGNOSIS — S83.512A RUPTURE OF ANTERIOR CRUCIATE LIGAMENT OF LEFT KNEE, INITIAL ENCOUNTER: Primary | ICD-10-CM

## 2022-04-26 PROCEDURE — 97530 THERAPEUTIC ACTIVITIES: CPT | Performed by: PHYSICAL THERAPIST

## 2022-04-26 PROCEDURE — 97110 THERAPEUTIC EXERCISES: CPT | Performed by: PHYSICAL THERAPIST

## 2022-04-26 NOTE — PROGRESS NOTES
Physical Therapy Daily Treatment Note      Patient: Zhane Traylor   : 1995  Referring practitioner: Troy Hsu MD  Date of Initial Visit: Type: THERAPY  Noted: 2022  Today's Date: 2022  Patient seen for 13 sessions           Subjective Questionnaire:       Subjective Evaluation    History of Present Illness    Subjective comment: Pt has no reports of pain and no new issues.       Objective   See Exercise, Manual, and Modality Logs for complete treatment.       Assessment & Plan     Assessment    Assessment details: Pt tolerated step ups on 8 inch step and other therapeutic exercises well. Pt has no complaints and is progressing well.        Visit Diagnoses:    ICD-10-CM ICD-9-CM   1. Rupture of anterior cruciate ligament of left knee, initial encounter  S83.512A 844.2   2. Gait disturbance  R26.9 781.2   3. Left knee pain, unspecified chronicity  M25.562 719.46   4. Weakness of left lower extremity  R29.898 729.89   5. Decreased range of motion (ROM) of left knee  M25.662 719.56       Progress per Plan of Care and Progress strengthening /stabilization /functional activity           Timed:  Manual Therapy:         mins  03455;  Therapeutic Exercise:    22     mins  00388;     Neuromuscular Lance:        mins  36591;    Therapeutic Activity:     8     mins  38236;     Gait Training:           mins  49461;     Ultrasound:          mins  64530;    Electrical Stimulation:         mins  69874 ( );  Aquatic Therapy          mins  85977    Untimed:  Electrical Stimulation:         mins  36353 ( );  Mechanical Traction:         mins  03351;     Timed Treatment:   30   mins   Total Treatment:     30mins    Electronically signed    Bridgette Hightower PTA  Physical Therapist Assistant    PAUL license: [id number]

## 2022-04-28 ENCOUNTER — TREATMENT (OUTPATIENT)
Dept: PHYSICAL THERAPY | Facility: CLINIC | Age: 27
End: 2022-04-28

## 2022-04-28 DIAGNOSIS — M25.662 DECREASED RANGE OF MOTION (ROM) OF LEFT KNEE: ICD-10-CM

## 2022-04-28 DIAGNOSIS — R26.9 GAIT DISTURBANCE: ICD-10-CM

## 2022-04-28 DIAGNOSIS — S83.512A RUPTURE OF ANTERIOR CRUCIATE LIGAMENT OF LEFT KNEE, INITIAL ENCOUNTER: Primary | ICD-10-CM

## 2022-04-28 DIAGNOSIS — M25.562 LEFT KNEE PAIN, UNSPECIFIED CHRONICITY: ICD-10-CM

## 2022-04-28 DIAGNOSIS — R29.898 WEAKNESS OF LEFT LOWER EXTREMITY: ICD-10-CM

## 2022-04-28 PROCEDURE — 97530 THERAPEUTIC ACTIVITIES: CPT | Performed by: PHYSICAL THERAPIST

## 2022-04-28 PROCEDURE — 97110 THERAPEUTIC EXERCISES: CPT | Performed by: PHYSICAL THERAPIST

## 2022-04-28 NOTE — PROGRESS NOTES
"Physical Therapy Daily Treatment Note      Patient: Zhane Traylor   : 1995  Referring practitioner: Troy Hsu MD  Date of Initial Visit: Type: THERAPY  Noted: 2022  Today's Date: 2022  Patient seen for 14 sessions           Subjective Questionnaire:       Subjective Evaluation    History of Present Illness    Subjective comment: Pt reports L knee is better and is able to ascend and descend stairs \"just fine.\"       Objective   See Exercise, Manual, and Modality Logs for complete treatment.       Assessment & Plan     Assessment    Assessment details: Advanced pt's resistance with some therapeutic exercise and she tolerated it well.  Continue with POC.        Visit Diagnoses:    ICD-10-CM ICD-9-CM   1. Rupture of anterior cruciate ligament of left knee, initial encounter  S83.512A 844.2   2. Gait disturbance  R26.9 781.2   3. Left knee pain, unspecified chronicity  M25.562 719.46   4. Weakness of left lower extremity  R29.898 729.89   5. Decreased range of motion (ROM) of left knee  M25.662 719.56       Progress per Plan of Care and Progress strengthening /stabilization /functional activity           Timed:  Manual Therapy:         mins  10825;  Therapeutic Exercise:    23     mins  82113;     Neuromuscular Lance:        mins  39940;    Therapeutic Activity:     8     mins  62472;     Gait Training:           mins  62787;     Ultrasound:          mins  18705;    Electrical Stimulation:         mins  81277 ( );  Aquatic Therapy          mins  42540    Untimed:  Electrical Stimulation:         mins  80177 ( );  Mechanical Traction:         mins  88956;     Timed Treatment:   31   mins   Total Treatment:     31   mins    Electronically signed    Bridgette Hightower PTA  Physical Therapist Assistant    [place]UL license: [id number]    "

## 2022-05-03 ENCOUNTER — TREATMENT (OUTPATIENT)
Dept: PHYSICAL THERAPY | Facility: CLINIC | Age: 27
End: 2022-05-03

## 2022-05-03 DIAGNOSIS — R29.898 WEAKNESS OF LEFT LOWER EXTREMITY: ICD-10-CM

## 2022-05-03 DIAGNOSIS — M25.662 DECREASED RANGE OF MOTION (ROM) OF LEFT KNEE: ICD-10-CM

## 2022-05-03 DIAGNOSIS — R26.9 GAIT DISTURBANCE: ICD-10-CM

## 2022-05-03 DIAGNOSIS — M25.562 LEFT KNEE PAIN, UNSPECIFIED CHRONICITY: ICD-10-CM

## 2022-05-03 DIAGNOSIS — S83.512A RUPTURE OF ANTERIOR CRUCIATE LIGAMENT OF LEFT KNEE, INITIAL ENCOUNTER: Primary | ICD-10-CM

## 2022-05-03 PROCEDURE — 97112 NEUROMUSCULAR REEDUCATION: CPT | Performed by: PHYSICAL THERAPIST

## 2022-05-03 PROCEDURE — 97530 THERAPEUTIC ACTIVITIES: CPT | Performed by: PHYSICAL THERAPIST

## 2022-05-03 PROCEDURE — 97110 THERAPEUTIC EXERCISES: CPT | Performed by: PHYSICAL THERAPIST

## 2022-05-03 NOTE — PROGRESS NOTES
Physical Therapy Daily Progress Note    Patient: Zhane Traylor   : 1995  Diagnosis/ICD-10 Code:  Rupture of anterior cruciate ligament of left knee, initial encounter [S83.512A]  Referring practitioner: Troy Hsu MD  Date of Initial Visit: Type: THERAPY  Noted: 2022  Today's Date: 5/3/2022  Patient seen for 15 sessions           Subjective Evaluation    History of Present Illness    Subjective comment: Pt reporting she is continued to improve with her strengthening. Pain  Current pain ratin           Objective   See Exercise, Manual, and Modality Logs for complete treatment.       Assessment & Plan     Assessment    Assessment details: Pt tolerated today's session well, continued with strengthening routine with good tolerance. Pt would benefit from continued skilled therapy to address deficits. Progress per plan of care.                 Manual Therapy:    0     mins  98660;  Therapeutic Exercise:    15     mins  92725;     Neuromuscular Lance:    10    mins  93420;    Therapeutic Activity:     10     mins  83570;     Gait Trainin     mins  42698;     Ultrasound:     0     mins  37176;    Electrical Stimulation:    0     mins  13604 ( );  Dry Needling     0     mins self-pay;  Aquatic Therapy    0     mins  92519;  Mechanical Traction    0     mins  95627  Moist Heat     0     mins  No charge    Timed Treatment:   35   mins   Total Treatment:     35   mins    Lit Chopra PT  Physical Therapist    Electronically signed 5/3/2022    [place] License: PT - [id number]

## 2022-05-05 ENCOUNTER — TREATMENT (OUTPATIENT)
Dept: PHYSICAL THERAPY | Facility: CLINIC | Age: 27
End: 2022-05-05

## 2022-05-05 DIAGNOSIS — R29.898 WEAKNESS OF LEFT LOWER EXTREMITY: ICD-10-CM

## 2022-05-05 DIAGNOSIS — M25.662 DECREASED RANGE OF MOTION (ROM) OF LEFT KNEE: ICD-10-CM

## 2022-05-05 DIAGNOSIS — R26.9 GAIT DISTURBANCE: ICD-10-CM

## 2022-05-05 DIAGNOSIS — M25.562 LEFT KNEE PAIN, UNSPECIFIED CHRONICITY: ICD-10-CM

## 2022-05-05 DIAGNOSIS — S83.512A RUPTURE OF ANTERIOR CRUCIATE LIGAMENT OF LEFT KNEE, INITIAL ENCOUNTER: Primary | ICD-10-CM

## 2022-05-05 PROCEDURE — 97110 THERAPEUTIC EXERCISES: CPT | Performed by: PHYSICAL THERAPIST

## 2022-05-05 PROCEDURE — 97530 THERAPEUTIC ACTIVITIES: CPT | Performed by: PHYSICAL THERAPIST

## 2022-05-05 NOTE — PROGRESS NOTES
Physical Therapy Daily Treatment Note      Patient: Zhane Traylor   : 1995  Referring practitioner: Troy Hsu MD  Date of Initial Visit: Type: THERAPY  Noted: 2022  Today's Date: 2022  Patient seen for 16 sessions           Subjective Questionnaire:       Subjective Evaluation    History of Present Illness    Subjective comment: Pt rports nothing new and no pain.       Objective   See Exercise, Manual, and Modality Logs for complete treatment.       Assessment & Plan     Assessment    Assessment details: Pt tolerated progressed therapeutic exercise well last tx stating she wasn't sore.  Pt's strength and balance is progressing well.  Pt reports she could feel lunges.        Visit Diagnoses:    ICD-10-CM ICD-9-CM   1. Rupture of anterior cruciate ligament of left knee, initial encounter  S83.512A 844.2   2. Gait disturbance  R26.9 781.2   3. Left knee pain, unspecified chronicity  M25.562 719.46   4. Weakness of left lower extremity  R29.898 729.89   5. Decreased range of motion (ROM) of left knee  M25.662 719.56       Progress per Plan of Care and Progress strengthening /stabilization /functional activity           Timed:  Manual Therapy:         mins  91566;  Therapeutic Exercise:    20     mins  41503;     Neuromuscular Lance:        mins  50321;    Therapeutic Activity:     8     mins  51992;     Gait Training:           mins  11492;     Ultrasound:          mins  94503;    Electrical Stimulation:         mins  46378 ( );  Aquatic Therapy          mins  82731    Untimed:  Electrical Stimulation:         mins  32537 ( );  Mechanical Traction:         mins  83233;     Timed Treatment: 28   mins   Total Treatment:     28   mins    Electronically signed    Bridgette Hightower PTA  Physical Therapist Assistant    PAUL license: [id number]

## 2022-05-10 ENCOUNTER — TREATMENT (OUTPATIENT)
Dept: PHYSICAL THERAPY | Facility: CLINIC | Age: 27
End: 2022-05-10

## 2022-05-10 DIAGNOSIS — R29.898 WEAKNESS OF LEFT LOWER EXTREMITY: ICD-10-CM

## 2022-05-10 DIAGNOSIS — M25.662 DECREASED RANGE OF MOTION (ROM) OF LEFT KNEE: ICD-10-CM

## 2022-05-10 DIAGNOSIS — S83.512A RUPTURE OF ANTERIOR CRUCIATE LIGAMENT OF LEFT KNEE, INITIAL ENCOUNTER: Primary | ICD-10-CM

## 2022-05-10 DIAGNOSIS — R26.9 GAIT DISTURBANCE: ICD-10-CM

## 2022-05-10 DIAGNOSIS — M25.562 LEFT KNEE PAIN, UNSPECIFIED CHRONICITY: ICD-10-CM

## 2022-05-10 PROCEDURE — 97110 THERAPEUTIC EXERCISES: CPT | Performed by: PHYSICAL THERAPIST

## 2022-05-10 PROCEDURE — 97112 NEUROMUSCULAR REEDUCATION: CPT | Performed by: PHYSICAL THERAPIST

## 2022-05-10 PROCEDURE — 97530 THERAPEUTIC ACTIVITIES: CPT | Performed by: PHYSICAL THERAPIST

## 2022-05-10 NOTE — PROGRESS NOTES
Physical Therapy Daily Progress Note    Patient: Zhane Traylor   : 1995  Diagnosis/ICD-10 Code:  Rupture of anterior cruciate ligament of left knee, initial encounter [S83.512A]  Referring practitioner: Troy Hsu MD  Date of Initial Visit: Type: THERAPY  Noted: 2022  Today's Date: 5/10/2022  Patient seen for 17 sessions           Subjective Evaluation    History of Present Illness  Mechanism of injury: Pt reporting she is feeling very well, she has tried jogging a little at home and feels good. She is feeling good with her current exercise program.    Pain  Current pain ratin           Objective   See Exercise, Manual, and Modality Logs for complete treatment.       Assessment & Plan     Assessment    Assessment details: Pt tolerated today's session well with progression of strengthening and balance activities today. She performed reverse lunges better than forward today. Pt would benefit from continued skilled therapy to address deficits. Progress per plan of care.               Manual Therapy:    0     mins  52241;  Therapeutic Exercise:    14     mins  85300;     Neuromuscular Lance:    10    mins  62437;    Therapeutic Activity:     10     mins  25575;     Gait Trainin     mins  10491;     Ultrasound:     0     mins  94236;    Electrical Stimulation:    0     mins  58995 ( );  Dry Needling     0     mins self-pay;  Aquatic Therapy    0     mins  98796;  Mechanical Traction    0     mins  32696  Moist Heat     0     mins  No charge    Timed Treatment:   34   mins   Total Treatment:     34   mins    Lit Chopra PT  Physical Therapist    Electronically signed 5/10/2022    [place] License: PT - [id number]

## 2022-05-12 ENCOUNTER — TREATMENT (OUTPATIENT)
Dept: PHYSICAL THERAPY | Facility: CLINIC | Age: 27
End: 2022-05-12

## 2022-05-12 DIAGNOSIS — R29.898 WEAKNESS OF LEFT LOWER EXTREMITY: ICD-10-CM

## 2022-05-12 DIAGNOSIS — R26.9 GAIT DISTURBANCE: ICD-10-CM

## 2022-05-12 DIAGNOSIS — M25.562 LEFT KNEE PAIN, UNSPECIFIED CHRONICITY: ICD-10-CM

## 2022-05-12 DIAGNOSIS — M25.662 DECREASED RANGE OF MOTION (ROM) OF LEFT KNEE: ICD-10-CM

## 2022-05-12 DIAGNOSIS — S83.512A RUPTURE OF ANTERIOR CRUCIATE LIGAMENT OF LEFT KNEE, INITIAL ENCOUNTER: Primary | ICD-10-CM

## 2022-05-12 PROCEDURE — 97530 THERAPEUTIC ACTIVITIES: CPT | Performed by: PHYSICAL THERAPIST

## 2022-05-12 PROCEDURE — 97110 THERAPEUTIC EXERCISES: CPT | Performed by: PHYSICAL THERAPIST

## 2022-05-12 PROCEDURE — 97112 NEUROMUSCULAR REEDUCATION: CPT | Performed by: PHYSICAL THERAPIST

## 2022-05-12 NOTE — PROGRESS NOTES
Physical Therapy Daily Progress Note    Patient: Zhane Traylor   : 1995  Diagnosis/ICD-10 Code:  Rupture of anterior cruciate ligament of left knee, initial encounter [S83.512A]  Referring practitioner: Troy Hsu MD  Date of Initial Visit: Type: THERAPY  Noted: 2022  Today's Date: 2022  Patient seen for 18 sessions           Subjective   The patient reported 0/10 pain in her knee today. Her thighs are still sore from her last PT session.    Objective   See Exercise, Manual, and Modality Logs for complete treatment.     Assessment/Plan  The patient demonstrated good tolerance to all lower extremity strengthening interventions today. Exercise progression was minimal today due to continued stiffness from prior session.       Timed:  Manual Therapy:    0     mins  65602;  Therapeutic Exercise:    14     mins  50933;     Neuromuscular Lance:   8    mins  14747;    Therapeutic Activity:     10     mins  83510;     Gait Trainin     mins  52756;     Aquatics                         0      mins  71755    Un-timed:  Mechanical Traction      0     mins  30353  Dry Needling     0     mins self-pay  Electrical Stimulation:    0     mins  54097 ( );      Timed Treatment:   32   mins   Total Treatment:     32   mins    Hansel Fernando PT  Physical Therapist    Electronically signed 2022    [place] License: PT - [id number]

## 2022-05-13 ENCOUNTER — OFFICE VISIT (OUTPATIENT)
Dept: FAMILY MEDICINE CLINIC | Facility: CLINIC | Age: 27
End: 2022-05-13

## 2022-05-13 VITALS
HEIGHT: 66 IN | BODY MASS INDEX: 41.5 KG/M2 | WEIGHT: 258.2 LBS | RESPIRATION RATE: 20 BRPM | TEMPERATURE: 97.4 F | HEART RATE: 100 BPM | DIASTOLIC BLOOD PRESSURE: 88 MMHG | OXYGEN SATURATION: 98 % | SYSTOLIC BLOOD PRESSURE: 138 MMHG

## 2022-05-13 DIAGNOSIS — R06.02 SHORTNESS OF BREATH: ICD-10-CM

## 2022-05-13 DIAGNOSIS — R73.03 BORDERLINE DIABETES: ICD-10-CM

## 2022-05-13 DIAGNOSIS — M54.50 LOW BACK PAIN, UNSPECIFIED BACK PAIN LATERALITY, UNSPECIFIED CHRONICITY, UNSPECIFIED WHETHER SCIATICA PRESENT: Primary | ICD-10-CM

## 2022-05-13 DIAGNOSIS — F41.1 GENERALIZED ANXIETY DISORDER: ICD-10-CM

## 2022-05-13 DIAGNOSIS — E78.2 MIXED HYPERLIPIDEMIA: ICD-10-CM

## 2022-05-13 DIAGNOSIS — R05.9 COUGH: ICD-10-CM

## 2022-05-13 DIAGNOSIS — J45.21 MILD INTERMITTENT ASTHMA WITH EXACERBATION: ICD-10-CM

## 2022-05-13 DIAGNOSIS — R07.89 FEELING OF CHEST TIGHTNESS: ICD-10-CM

## 2022-05-13 LAB
BILIRUB BLD-MCNC: NEGATIVE MG/DL
CLARITY, POC: CLEAR
COLOR UR: YELLOW
EXPIRATION DATE: NORMAL
GLUCOSE UR STRIP-MCNC: NEGATIVE MG/DL
KETONES UR QL: NEGATIVE
LEUKOCYTE EST, POC: NEGATIVE
Lab: NORMAL
NITRITE UR-MCNC: NEGATIVE MG/ML
PH UR: 5.5 [PH] (ref 5–8)
PROT UR STRIP-MCNC: NEGATIVE MG/DL
RBC # UR STRIP: NEGATIVE /UL
SP GR UR: 1.03 (ref 1–1.03)
UROBILINOGEN UR QL: NORMAL

## 2022-05-13 PROCEDURE — 81003 URINALYSIS AUTO W/O SCOPE: CPT | Performed by: NURSE PRACTITIONER

## 2022-05-13 PROCEDURE — 99214 OFFICE O/P EST MOD 30 MIN: CPT | Performed by: NURSE PRACTITIONER

## 2022-05-13 RX ORDER — FLUTICASONE PROPIONATE AND SALMETEROL 250; 50 UG/1; UG/1
1 POWDER RESPIRATORY (INHALATION)
Qty: 60 EACH | Refills: 0 | Status: SHIPPED | OUTPATIENT
Start: 2022-05-13 | End: 2022-10-07 | Stop reason: SDUPTHER

## 2022-05-13 NOTE — PROGRESS NOTES
Chief Complaint  Back Pain (Low back pain), Chest Pain (Tightness in chest ), Shortness of Breath, Tingling (Down arms into hands ), Altered Mental Status (Confusion/fogginess ), and Establish Care (Transition from [place] to [place])    Subjective          Zhane Traylor presents to [facility]  History of Present Illness  She was having low back pain, SOA, tightness in the chest.  She is on ativan now and she knows what anxiety is and she said it is not anxiety.  She is having palpitations when her BP goes up.  She is pre-diabetic and she doesn't know if it is sugar related.  She had PT and reg therapy and yesterday she couldn't talk--she knew her anxiety and she did feel better.  She has been exhausted.  It is messing her sleep.  She has not been to heart dr.    She has noticed the SOA even when she is sitting.  If she does anything then she gets increased SOA.  She feels that before surg she doesn't recall having SOA.  She had surg on the ACL 2022.  She does have asthma in the past but hasn't been on the med but maybe did it once and didn't really help.  She used to see asthma and allergy but doesn't anymore.    She see's psych for your reg med.    Allergies  Patient has no known allergies.    Social History     Tobacco Use   • Smoking status: Former Smoker     Packs/day: 0.50     Years: 6.00     Pack years: 3.00     Types: Cigarettes     Start date: 10/19/2010     Quit date: 10/19/2016     Years since quittin.5   • Smokeless tobacco: Never Used   Vaping Use   • Vaping Use: Never used   Substance Use Topics   • Alcohol use: Not Currently     Comment: LAST DRINK 2021   • Drug use: Never       Family History   Problem Relation Age of Onset   • Heart disease Other    • Diabetes Other         Health Maintenance Due   Topic Date Due   • ANNUAL PHYSICAL  Never done   • TDAP/TD VACCINES (2 - Td or Tdap) 2017   • HEPATITIS C SCREENING  Never done        Immunization History  "  Administered Date(s) Administered   • COVID-19 (PFIZER) PURPLE CAP 08/03/2021, 08/24/2021   • Covid-19 (Pfizer) Gray Cap 01/24/2022   • DT 10/17/1997   • DTP / HiB 1995, 1995, 01/06/1997   • DTaP 10/17/1997   • DTaP / Hep B / IPV 1995, 1995, 1996, 01/06/1997, 07/14/1997, 08/28/2013   • DTaP / IPV 07/28/2000   • DTaP, Unspecified 07/28/2000   • Flu Vaccine Quad PF >36MO 10/07/2020   • FluLaval/Fluarix/Fluzone >6 10/19/2021   • HPV Quadrivalent 06/17/2014, 08/18/2014, 12/22/2014   • Hep A, 2 Dose 06/17/2014, 12/22/2014   • Hep B, Adolescent or Pediatric 1996, 07/14/1997   • HiB 10/17/1997   • Hib (PRP-T) 1995, 1996, 01/06/1997, 10/17/1997   • IPV 07/28/2000   • Influenza Quad Vaccine (Inpatient) 09/16/2013   • MMR 10/17/1997, 07/28/2000   • MMRV 07/28/2000, 07/03/2007   • Meningococcal Conjugate 07/03/2007, 06/17/2014   • OPV 1995, 1996, 01/06/1997   • Tdap 07/03/2007   • Varicella 07/14/1997       Review of Systems   Constitutional: Positive for fatigue.   Respiratory: Positive for chest tightness and shortness of breath.    Gastrointestinal: Positive for nausea. Negative for diarrhea and vomiting.   Neurological: Positive for dizziness and confusion.        Objective       Vitals:    05/13/22 1405 05/13/22 1420   BP: 148/87 138/88   Pulse: 100    Resp: 20    Temp: 97.4 °F (36.3 °C)    SpO2: 98%    Weight: 117 kg (258 lb 3.2 oz)    Height: 167.6 cm (66\")        Body mass index is 41.67 kg/m².         Physical Exam  Vitals reviewed.   Constitutional:       Appearance: Normal appearance. She is well-developed.   Cardiovascular:      Rate and Rhythm: Normal rate and regular rhythm.      Heart sounds: Normal heart sounds. No murmur heard.  Pulmonary:      Effort: Pulmonary effort is normal.      Breath sounds: Normal breath sounds.   Neurological:      Mental Status: She is alert and oriented to person, place, and time.      Cranial Nerves: No cranial nerve " deficit.      Motor: No weakness.   Psychiatric:         Mood and Affect: Mood and affect normal.             Result Review :     The following data was reviewed by: CLEO Granado on 05/13/2022:    Common labs    Common Labsle 9/30/21 9/30/21 9/30/21 9/30/21 3/2/22 3/2/22 3/7/22 3/7/22    1237 1237 1237 1237 0927 0927 1422 1422   Glucose    79  101 (A)  88   BUN    17  22 (A)  12   Creatinine    0.90  1.19 (A)  1.10 (A)   eGFR Non African Am    76       Sodium    139  135 (A)  137   Potassium    4.2  3.6  4.0   Chloride    105  101  102   Calcium    9.6  9.6  9.8   Albumin    4.20  4.20  4.40   Total Bilirubin    0.3  0.3  0.3   Alkaline Phosphatase    86  81  91   AST (SGOT)    15  16  17   ALT (SGPT)    13  16  17   WBC 8.51    8.13  5.64    Hemoglobin 13.3    13.6  14.1    Hematocrit 40.4    42.8  44.3    Platelets 313    287  280    Total Cholesterol   199        Triglycerides   59        HDL Cholesterol   55        LDL Cholesterol    133 (A)        Hemoglobin A1C  5.67 (A)         (A) Abnormal value            Most Recent A1C    HGBA1C Most Recent 9/30/21   Hemoglobin A1C 5.67 (A)   (A) Abnormal value              Radiologic studies CT chest and abd and normal             Assessment and Plan      Diagnoses and all orders for this visit:    1. Low back pain, unspecified back pain laterality, unspecified chronicity, unspecified whether sciatica present (Primary)  -     POCT urinalysis dipstick, automated    2. Shortness of breath  -     Thyroid Antibodies; Future  -     Triiodothyronine (T3) Total & Free; Future  -     T3, Reverse; Future  -     Iron Profile; Future  -     Ferritin; Future    3. Cough  -     Fluticasone-Salmeterol (Advair Diskus) 250-50 MCG/ACT DISKUS; Inhale 1 puff 2 (Two) Times a Day. For asthma. Rinse mouth after use  Dispense: 60 each; Refill: 0    4. Mild intermittent asthma with exacerbation  -     Fluticasone-Salmeterol (Advair Diskus) 250-50 MCG/ACT DISKUS; Inhale 1 puff 2  (Two) Times a Day. For asthma. Rinse mouth after use  Dispense: 60 each; Refill: 0  -     Full Pulmonary Function Test With Bronchodilator; Future    5. Feeling of chest tightness  -     Glucose Tolerance, 3 Hours; Future  -     Thyroid Antibodies; Future  -     Triiodothyronine (T3) Total & Free; Future  -     T3, Reverse; Future  -     Iron Profile; Future  -     Ferritin; Future    6. Generalized anxiety disorder  -     Glucose Tolerance, 3 Hours; Future  -     Vitamin D 25 Hydroxy; Future  -     Vitamin B12 & Folate; Future  -     T4, Free; Future  -     CBC & Differential; Future  -     Comprehensive Metabolic Panel; Future  -     TSH; Future    7. Borderline diabetes  -     Glucose Tolerance, 3 Hours; Future  -     Hemoglobin A1c; Future  -     MicroAlbumin, Urine, Random - Urine, Clean Catch; Future    8. Mixed hyperlipidemia  -     Lipid Panel; Future            Follow Up     Return if symptoms worsen or fail to improve.  We will do PFT and labs then we will from there.  Do the advair daily for 2 weeks and let me know how that is doing.  Call with any concerns or questions. We have a plan in action now we will wait for results.  Patient was given instructions and counseling regarding her condition or for health maintenance advice. Please see specific information pulled into the AVS if appropriate.         Ami Giordano, APRN  05/13/2022

## 2022-05-17 ENCOUNTER — OFFICE VISIT (OUTPATIENT)
Dept: ORTHOPEDIC SURGERY | Facility: CLINIC | Age: 27
End: 2022-05-17

## 2022-05-17 ENCOUNTER — TREATMENT (OUTPATIENT)
Dept: PHYSICAL THERAPY | Facility: CLINIC | Age: 27
End: 2022-05-17

## 2022-05-17 VITALS — HEART RATE: 83 BPM | WEIGHT: 260.8 LBS | HEIGHT: 66 IN | OXYGEN SATURATION: 99 % | BODY MASS INDEX: 41.91 KG/M2

## 2022-05-17 DIAGNOSIS — M25.662 DECREASED RANGE OF MOTION (ROM) OF LEFT KNEE: ICD-10-CM

## 2022-05-17 DIAGNOSIS — R29.898 WEAKNESS OF LEFT LOWER EXTREMITY: ICD-10-CM

## 2022-05-17 DIAGNOSIS — M25.562 LEFT KNEE PAIN, UNSPECIFIED CHRONICITY: ICD-10-CM

## 2022-05-17 DIAGNOSIS — R26.9 GAIT DISTURBANCE: ICD-10-CM

## 2022-05-17 DIAGNOSIS — Z47.89 AFTERCARE FOLLOWING SURGERY OF THE MUSCULOSKELETAL SYSTEM: Primary | ICD-10-CM

## 2022-05-17 DIAGNOSIS — S83.512A RUPTURE OF ANTERIOR CRUCIATE LIGAMENT OF LEFT KNEE, INITIAL ENCOUNTER: Primary | ICD-10-CM

## 2022-05-17 PROCEDURE — 99024 POSTOP FOLLOW-UP VISIT: CPT | Performed by: PHYSICIAN ASSISTANT

## 2022-05-17 PROCEDURE — 97530 THERAPEUTIC ACTIVITIES: CPT | Performed by: PHYSICAL THERAPIST

## 2022-05-17 PROCEDURE — 97112 NEUROMUSCULAR REEDUCATION: CPT | Performed by: PHYSICAL THERAPIST

## 2022-05-17 PROCEDURE — 97110 THERAPEUTIC EXERCISES: CPT | Performed by: PHYSICAL THERAPIST

## 2022-05-17 NOTE — PROGRESS NOTES
Physical Therapy Daily Progress Note    Patient: Zhane Traylor   : 1995  Diagnosis/ICD-10 Code:  Rupture of anterior cruciate ligament of left knee, initial encounter [S83.512A]  Referring practitioner: Troy Hsu MD  Date of Initial Visit: Type: THERAPY  Noted: 2022  Today's Date: 2022  Patient seen for 19 sessions           Subjective Evaluation    History of Present Illness    Subjective comment: Pt reporting she is feeling good today, her physician appt went well. She reports wanting to continue for about 6 more weeks. Pain  Current pain ratin           Objective   See Exercise, Manual, and Modality Logs for complete treatment.       Assessment & Plan     Assessment    Assessment details: Pt tolerated today's session well, continued progression of strengthening, endurance, and balance activities with good tolerance. Pt would benefit from continued skilled therapy to address deficits. Progress per plan of care.                 Manual Therapy:    0     mins  38645;  Therapeutic Exercise:    12     mins  44797;     Neuromuscular Lance:    8    mins  28496;    Therapeutic Activity:     15     mins  43189;     Gait Trainin     mins  72520;     Ultrasound:     0     mins  19398;    Electrical Stimulation:    0     mins  41568 ( );  Dry Needling     0     mins self-pay;  Aquatic Therapy    0     mins  32614;  Mechanical Traction    0     mins  96388  Moist Heat     0     mins  No charge    Timed Treatment:   35   mins   Total Treatment:     35   mins    Lit Chopra PT  Physical Therapist    Electronically signed 2022    [place] License: PT - [id number]

## 2022-05-17 NOTE — PROGRESS NOTES
"Chief Complaint  Pain and Follow-up of the Left Knee    Subjective          Zhane Traylor is a 26 y.o. female  presents to [facility] for   History of Present Illness      Patient presents for follow-up evaluation of left knee arthroscopic ACL reconstruction with allograft, 2022.  Patient states she continues to have good recovery she states her knee got better after her fall and she did not need an MRI and she has been tolerating therapy and activity without any pain.  Patient has been attending therapy twice a week plus home exercises she states last week they started working on squats/lunges and they are going to start working on running with her soon.  She states she runs around at home with her child and this is going okay.  She states she has been kneeling on the knee with no pain, she denies pain or need for pain medication, no new complaints.  She presents with her knee brace.      No Known Allergies     Social History     Socioeconomic History   • Marital status: Single   Tobacco Use   • Smoking status: Former Smoker     Packs/day: 0.50     Years: 6.00     Pack years: 3.00     Types: Cigarettes     Start date: 10/19/2010     Quit date: 10/19/2016     Years since quittin.5   • Smokeless tobacco: Never Used   Vaping Use   • Vaping Use: Never used   Substance and Sexual Activity   • Alcohol use: Not Currently     Comment: LAST DRINK 2021   • Drug use: Never   • Sexual activity: Defer        REVIEW OF SYSTEMS    Constitutional: Denies fevers, chills, weight loss  Cardiovascular: Denies chest pain, shortness of breath  Skin: Denies rashes, acute skin changes  Neurologic: Denies headache, loss of consciousness  MSK: Left knee pain      Objective   Vital Signs:   Pulse 83   Ht 167.6 cm (66\")   Wt 118 kg (260 lb 12.8 oz)   SpO2 99%   BMI 42.09 kg/m²     Body mass index is 42.09 kg/m².    Physical Exam    Left knee: Well-healed incisions, no erythema, no ecchymosis, no " tenderness to palpation of the incision sites, full extension, flexion 120, stable to varus/valgus stress, stable anterior/posterior drawer, patient ambulates with nonantalgic gait, 5 out of 5 strength, patient able hold straight leg raise nontender calf, negative Sushma test    Procedures    Imaging Results (Most Recent)     None           Result Review :   The following data was reviewed by: AMY Marino on 05/17/2022:               Assessment and Plan    Diagnoses and all orders for this visit:    1. Aftercare following surgery of left knee arthroscopic ACL reconstruction with allograft, 2/21/2022 (Primary)  -     Ambulatory Referral to Physical Therapy Evaluate and treat (2-3x/week for 6-8 weeks)        Discussed diagnosis and treatment options with the patient she was advised to use her brace with activity out of the house or with rigorous activities, continue physical therapy if any new or concerning symptoms occur follow-up sooner otherwise follow-up in 6 weeks, patient agreed.    Call or return if worsening symptoms.    Follow Up   Return in about 6 weeks (around 6/28/2022) for Recheck.  Patient was given instructions and counseling regarding her condition or for health maintenance advice. Please see specific information pulled into the AVS if appropriate.

## 2022-05-24 ENCOUNTER — LAB (OUTPATIENT)
Dept: LAB | Facility: HOSPITAL | Age: 27
End: 2022-05-24

## 2022-05-24 DIAGNOSIS — E78.2 MIXED HYPERLIPIDEMIA: ICD-10-CM

## 2022-05-24 DIAGNOSIS — R07.89 FEELING OF CHEST TIGHTNESS: ICD-10-CM

## 2022-05-24 DIAGNOSIS — R73.03 BORDERLINE DIABETES: ICD-10-CM

## 2022-05-24 DIAGNOSIS — R06.02 SHORTNESS OF BREATH: ICD-10-CM

## 2022-05-24 DIAGNOSIS — F41.1 GENERALIZED ANXIETY DISORDER: ICD-10-CM

## 2022-05-24 LAB
25(OH)D3 SERPL-MCNC: 14 NG/ML (ref 30–100)
ALBUMIN SERPL-MCNC: 4.3 G/DL (ref 3.5–5.2)
ALBUMIN UR-MCNC: <1.2 MG/DL
ALBUMIN/GLOB SERPL: 1.7 G/DL
ALP SERPL-CCNC: 90 U/L (ref 39–117)
ALT SERPL W P-5'-P-CCNC: 19 U/L (ref 1–33)
ANION GAP SERPL CALCULATED.3IONS-SCNC: 12.7 MMOL/L (ref 5–15)
AST SERPL-CCNC: 13 U/L (ref 1–32)
BASOPHILS # BLD AUTO: 0.03 10*3/MM3 (ref 0–0.2)
BASOPHILS NFR BLD AUTO: 0.4 % (ref 0–1.5)
BILIRUB SERPL-MCNC: 0.2 MG/DL (ref 0–1.2)
BUN SERPL-MCNC: 20 MG/DL (ref 6–20)
BUN/CREAT SERPL: 22.7 (ref 7–25)
CALCIUM SPEC-SCNC: 9.2 MG/DL (ref 8.6–10.5)
CHLORIDE SERPL-SCNC: 107 MMOL/L (ref 98–107)
CHOLEST SERPL-MCNC: 157 MG/DL (ref 0–200)
CO2 SERPL-SCNC: 22.3 MMOL/L (ref 22–29)
CREAT SERPL-MCNC: 0.88 MG/DL (ref 0.57–1)
DEPRECATED RDW RBC AUTO: 42.1 FL (ref 37–54)
EGFRCR SERPLBLD CKD-EPI 2021: 93.7 ML/MIN/1.73
EOSINOPHIL # BLD AUTO: 0.12 10*3/MM3 (ref 0–0.4)
EOSINOPHIL NFR BLD AUTO: 1.5 % (ref 0.3–6.2)
ERYTHROCYTE [DISTWIDTH] IN BLOOD BY AUTOMATED COUNT: 13.2 % (ref 12.3–15.4)
FERRITIN SERPL-MCNC: 48.5 NG/ML (ref 13–150)
FOLATE SERPL-MCNC: 7.16 NG/ML (ref 4.78–24.2)
GLOBULIN UR ELPH-MCNC: 2.6 GM/DL
GLUCOSE 1H P 100 G GLC PO SERPL-MCNC: 160 MG/DL (ref 74–180)
GLUCOSE 2H P 100 G GLC PO SERPL-MCNC: 83 MG/DL (ref 74–155)
GLUCOSE 3H P 100 G GLC PO SERPL-MCNC: 78 MG/DL (ref 74–140)
GLUCOSE BLDC GLUCOMTR-MCNC: 105 MG/DL (ref 70–99)
GLUCOSE P FAST SERPL-MCNC: 106 MG/DL (ref 74–106)
GLUCOSE SERPL-MCNC: 89 MG/DL (ref 65–99)
HBA1C MFR BLD: 5.3 % (ref 4.8–5.6)
HCT VFR BLD AUTO: 43.9 % (ref 34–46.6)
HDLC SERPL-MCNC: 46 MG/DL (ref 40–60)
HGB BLD-MCNC: 14 G/DL (ref 12–15.9)
IMM GRANULOCYTES # BLD AUTO: 0.01 10*3/MM3 (ref 0–0.05)
IMM GRANULOCYTES NFR BLD AUTO: 0.1 % (ref 0–0.5)
IRON 24H UR-MRATE: 42 MCG/DL (ref 37–145)
IRON SATN MFR SERPL: 9 % (ref 20–50)
LDLC SERPL CALC-MCNC: 96 MG/DL (ref 0–100)
LDLC/HDLC SERPL: 2.06 {RATIO}
LYMPHOCYTES # BLD AUTO: 1.67 10*3/MM3 (ref 0.7–3.1)
LYMPHOCYTES NFR BLD AUTO: 20.9 % (ref 19.6–45.3)
MCH RBC QN AUTO: 27.7 PG (ref 26.6–33)
MCHC RBC AUTO-ENTMCNC: 31.9 G/DL (ref 31.5–35.7)
MCV RBC AUTO: 86.9 FL (ref 79–97)
MONOCYTES # BLD AUTO: 0.71 10*3/MM3 (ref 0.1–0.9)
MONOCYTES NFR BLD AUTO: 8.9 % (ref 5–12)
NEUTROPHILS NFR BLD AUTO: 5.45 10*3/MM3 (ref 1.7–7)
NEUTROPHILS NFR BLD AUTO: 68.2 % (ref 42.7–76)
NRBC BLD AUTO-RTO: 0 /100 WBC (ref 0–0.2)
PLATELET # BLD AUTO: 302 10*3/MM3 (ref 140–450)
PMV BLD AUTO: 10.1 FL (ref 6–12)
POTASSIUM SERPL-SCNC: 4.1 MMOL/L (ref 3.5–5.2)
PROT SERPL-MCNC: 6.9 G/DL (ref 6–8.5)
RBC # BLD AUTO: 5.05 10*6/MM3 (ref 3.77–5.28)
SODIUM SERPL-SCNC: 142 MMOL/L (ref 136–145)
T4 FREE SERPL-MCNC: 1.14 NG/DL (ref 0.93–1.7)
TIBC SERPL-MCNC: 443 MCG/DL (ref 298–536)
TRANSFERRIN SERPL-MCNC: 297 MG/DL (ref 200–360)
TRIGL SERPL-MCNC: 81 MG/DL (ref 0–150)
TSH SERPL DL<=0.05 MIU/L-ACNC: 1.91 UIU/ML (ref 0.27–4.2)
VIT B12 BLD-MCNC: 304 PG/ML (ref 211–946)
VLDLC SERPL-MCNC: 15 MG/DL (ref 5–40)
WBC NRBC COR # BLD: 7.99 10*3/MM3 (ref 3.4–10.8)

## 2022-05-24 PROCEDURE — 82728 ASSAY OF FERRITIN: CPT

## 2022-05-24 PROCEDURE — 84482 T3 REVERSE: CPT

## 2022-05-24 PROCEDURE — 36415 COLL VENOUS BLD VENIPUNCTURE: CPT

## 2022-05-24 PROCEDURE — 83036 HEMOGLOBIN GLYCOSYLATED A1C: CPT

## 2022-05-24 PROCEDURE — 84466 ASSAY OF TRANSFERRIN: CPT

## 2022-05-24 PROCEDURE — 86800 THYROGLOBULIN ANTIBODY: CPT

## 2022-05-24 PROCEDURE — 82952 GTT-ADDED SAMPLES: CPT

## 2022-05-24 PROCEDURE — 82746 ASSAY OF FOLIC ACID SERUM: CPT

## 2022-05-24 PROCEDURE — 83540 ASSAY OF IRON: CPT

## 2022-05-24 PROCEDURE — 82951 GLUCOSE TOLERANCE TEST (GTT): CPT

## 2022-05-24 PROCEDURE — 82306 VITAMIN D 25 HYDROXY: CPT

## 2022-05-24 PROCEDURE — 84481 FREE ASSAY (FT-3): CPT

## 2022-05-24 PROCEDURE — 80061 LIPID PANEL: CPT

## 2022-05-24 PROCEDURE — 80050 GENERAL HEALTH PANEL: CPT

## 2022-05-24 PROCEDURE — 84439 ASSAY OF FREE THYROXINE: CPT

## 2022-05-24 PROCEDURE — 82607 VITAMIN B-12: CPT

## 2022-05-24 PROCEDURE — 86376 MICROSOMAL ANTIBODY EACH: CPT

## 2022-05-24 PROCEDURE — 82043 UR ALBUMIN QUANTITATIVE: CPT

## 2022-05-25 LAB
T3 SERPL-MCNC: 122 NG/DL (ref 71–180)
T3FREE SERPL-MCNC: 3.3 PG/ML (ref 2–4.4)
THYROGLOB AB SERPL-ACNC: <1 IU/ML (ref 0–0.9)
THYROPEROXIDASE AB SERPL-ACNC: <8 IU/ML (ref 0–34)

## 2022-05-26 ENCOUNTER — TREATMENT (OUTPATIENT)
Dept: PHYSICAL THERAPY | Facility: CLINIC | Age: 27
End: 2022-05-26

## 2022-05-26 DIAGNOSIS — M25.562 LEFT KNEE PAIN, UNSPECIFIED CHRONICITY: ICD-10-CM

## 2022-05-26 DIAGNOSIS — R29.898 WEAKNESS OF LEFT LOWER EXTREMITY: ICD-10-CM

## 2022-05-26 DIAGNOSIS — S83.512A RUPTURE OF ANTERIOR CRUCIATE LIGAMENT OF LEFT KNEE, INITIAL ENCOUNTER: Primary | ICD-10-CM

## 2022-05-26 DIAGNOSIS — M25.662 DECREASED RANGE OF MOTION (ROM) OF LEFT KNEE: ICD-10-CM

## 2022-05-26 DIAGNOSIS — R26.9 GAIT DISTURBANCE: ICD-10-CM

## 2022-05-26 PROCEDURE — 97110 THERAPEUTIC EXERCISES: CPT | Performed by: PHYSICAL THERAPIST

## 2022-05-26 PROCEDURE — 97530 THERAPEUTIC ACTIVITIES: CPT | Performed by: PHYSICAL THERAPIST

## 2022-05-26 NOTE — PROGRESS NOTES
Re-Assessment / Re-Certification      Patient: Zhane Traylor   : 1995  Diagnosis/ICD-10 Code:  Rupture of anterior cruciate ligament of left knee, initial encounter [S83.512A]  Referring practitioner: Troy Hsu MD  Date of Initial Visit: Type: THERAPY  Noted: 2022  Today's Date: 2022  Patient seen for 20 sessions      Subjective:   Subjective Questionnaire: LEFS: 63/80  Clinical Progress: improved  Home Program Compliance: Yes  Treatment has included: therapeutic exercise, neuromuscular re-education, manual therapy, therapeutic activity and gait training    Subjective Evaluation    History of Present Illness  Mechanism of injury: Pt reporting she is feeling very good with her rehab thus far. She is able to get down in the floor and play with her daughter, though still limited on running with her at this time. She is trying to start walking longer distances without too much pain.    Pain  Current pain ratin           Objective     Active Range of Motion   Left Knee   Flexion: 132 degrees   Extensor la degrees            Strength/Myotome Testing      Left Knee   Quadriceps contraction: good    Additional Strength Details  Knee flexion 4/5  Knee extension 4+/5        Ambulation      Comments   Patient ambulates without assistive device, she has not been using her brace as much and feeling more confident     Squat: able to perform full functional squat without deviation          See Exercise, Manual, and Modality Logs for complete treatment.     Assessment & Plan     Assessment  Impairments: abnormal gait, abnormal or restricted ROM, activity intolerance, impaired physical strength, lacks appropriate home exercise program, pain with function and weight-bearing intolerance  Functional Limitations: sleeping, walking, uncomfortable because of pain, standing, stooping and unable to perform repetitive tasks  Assessment details: Pt presents s/p Left ACL repair with allograft on 22,  she is progressing well, and has met the majority of her goals set at the evaluation, except for her long term strength goal. She is tolerating progression of functional strength well. At this time, most limited due to strength with exercises and functional mobility. She is expected to continue to progress with ongoing PT for further improving strength, ROM, and functional capacity before moving on to an independent HEP.  Prognosis: good    Goals  Plan Goals: 1. The patient has limited ROM of the left knee.   LTG 1: 12weeks:  The patient will demonstrate 0 to 125 degrees of ROM for the left knee in order to allow patient to normalize gait/stair negotiation.   STATUS:  MET  STG 1a: 6 weeks:  The patient will demonstrate 0 to 120 degrees of ROM for the left knee.   STATUS:  MET     2. The patient has limited strength of the left knee.   LTG 2: 12 weeks: The patient will demonstrate 4+/5 strength for left knee flexion and extension in order to allow patient improved joint stability.   STATUS:  Not met, progressing  STG 2a: 6 weeks: The patient will demonstrate 4/5 strength for left knee flexion and extension   STATUS:  MET     3. The patient has gait dysfunction.   LTG 3: 6 weeks:  The patient will ambulate without assistive device, independently, for community distances with minimal limp to the left lower extremity in order to improve mobility and allow patient to perform activities such as grocery shopping with greater ease.   STATUS:  MET  STG 3a: The patient will be independent in HEP.   STATUS:  MET, still ongoing/updating     4. The patient complains of left knee pain.   LTG 4: 12 weeks:  The patient will report a pain rating of 1/10 or better in order to improve   tolerance to activities of daily living and improve sleep quality.   STATUS:  Not met, progressing  STG 4a: 6 weeks:  The patient will report a pain rating of 1/10 or better.   STATUS:  MET     5. Mobility: Walking/Moving Around Functional Limitation      LTG 5: 12weeks:  The patient will demonstrate 25% limitation by achieving a score of 60/80 on the Lower Extremity Functional Scale.   STATUS:  MET  STG 5a: 6 weeks:  The patient will demonstrate 37% limitation by achieving a score of 50/80 on the Lower Extremity Functional Scale.     STATUS:  MET    TREATMENT: Manual therapy, gait training, neuromuscular re-education, therapeutic exercise, home exercise instruction, and modalities as needed to include:  moist heat, electrical stimulation, ultrasound, and ice.       Plan  Therapy options: will be seen for skilled therapy services  Planned modality interventions: cryotherapy and TENS  Planned therapy interventions: manual therapy, soft tissue mobilization, strengthening, stretching, therapeutic activities, home exercise program, gait training, functional ROM exercises, flexibility and balance/weight-bearing training  Frequency: 2x week  Duration in weeks: 12  Treatment plan discussed with: patient      Progress toward previous goals: Partially Met        Recommendations: Continue as planned  Timeframe: 3 months  Prognosis to achieve goals: good    PT Signature: Lit Chopra PT    Electronically signed 5/26/2022    [place] License: PT - [id number]     Based upon review of the patient's progress and continued therapy plan, it is my medical opinion that Zhane Traylor should continue physical therapy treatment at [facility]  [address]  [place] [address] [phone].    Signature: __________________________________  Troy Hsu MD  NPI: [id number]         90 Day Recertification  Certification Period: 5/26/2022 thru 8/23/2022  I certify that the therapy services are furnished while this patient is under my care.  The services outlined above are required by this patient, and will be reviewed every 90 days.      Please sign and return via fax to [phone]. Thank you, [facility].    Manual  Therapy:    0     mins  94372;  Therapeutic Exercise:    24     mins  52364;     Neuromuscular Lance:    0    mins  84835;    Therapeutic Activity:     8     mins  80532;     Gait Trainin     mins  80917;     Ultrasound:     0     mins  91569;    Electrical Stimulation:    0     mins  23394 ( );  Dry Needling     0     mins self-pay  Moist Heat     0     mins No charge  Aquatic Therapy    0     mins  78664  Re-Eval                           0    mins  77958    Timed Treatment:   32   mins   Total Treatment:     32   mins

## 2022-05-29 LAB — T3REVERSE SERPL-MCNC: 14.1 NG/DL (ref 9.2–24.1)

## 2022-06-07 ENCOUNTER — TELEPHONE (OUTPATIENT)
Dept: PHYSICAL THERAPY | Facility: CLINIC | Age: 27
End: 2022-06-07

## 2022-06-09 ENCOUNTER — TREATMENT (OUTPATIENT)
Dept: PHYSICAL THERAPY | Facility: CLINIC | Age: 27
End: 2022-06-09

## 2022-06-09 DIAGNOSIS — R29.898 WEAKNESS OF LEFT LOWER EXTREMITY: ICD-10-CM

## 2022-06-09 DIAGNOSIS — M25.562 LEFT KNEE PAIN, UNSPECIFIED CHRONICITY: ICD-10-CM

## 2022-06-09 DIAGNOSIS — S83.512A RUPTURE OF ANTERIOR CRUCIATE LIGAMENT OF LEFT KNEE, INITIAL ENCOUNTER: Primary | ICD-10-CM

## 2022-06-09 DIAGNOSIS — R26.9 GAIT DISTURBANCE: ICD-10-CM

## 2022-06-09 DIAGNOSIS — M25.662 DECREASED RANGE OF MOTION (ROM) OF LEFT KNEE: ICD-10-CM

## 2022-06-09 PROCEDURE — 97530 THERAPEUTIC ACTIVITIES: CPT | Performed by: PHYSICAL THERAPIST

## 2022-06-09 PROCEDURE — 97110 THERAPEUTIC EXERCISES: CPT | Performed by: PHYSICAL THERAPIST

## 2022-06-09 NOTE — PROGRESS NOTES
Physical Therapy Daily Treatment Note      Patient: Zhane Traylor   : 1995  Referring practitioner: Troy Hsu MD  Date of Initial Visit: Type: THERAPY  Noted: 2022  Today's Date: 2022  Patient seen for 21 sessions           Subjective  Zhane Traylor reports: No pain, doing all my normal activities at home.        Objective   See Exercise, Manual, and Modality Logs for complete treatment.       Assessment/Plan  No difficulty or pain with program performed today. She did tolerate increased resistance with most of the strengthening exercises. Continue per outlined POC from recent re-evaluation.     Visit Diagnoses:    ICD-10-CM ICD-9-CM   1. Rupture of anterior cruciate ligament of left knee, initial encounter  S83.512A 844.2   2. Gait disturbance  R26.9 781.2   3. Left knee pain, unspecified chronicity  M25.562 719.46   4. Weakness of left lower extremity  R29.898 729.89   5. Decreased range of motion (ROM) of left knee  M25.662 719.56       Progress per Plan of Care and Progress strengthening /stabilization /functional activity           Timed:  Manual Therapy:         mins  31211;  Therapeutic Exercise:    23     mins  07284;     Neuromuscular Lance:        mins  43437;    Therapeutic Activity:     8     mins  68459;     Gait Training:           mins  11183;     Ultrasound:          mins  97824;    Electrical Stimulation:         mins  27684 ( );  Aquatics  __   mins   17957    Untimed:  Electrical Stimulation:         mins  16225 ( );  Mechanical Traction:         mins  77976;     Timed Treatment:   31   mins   Total Treatment:     31   mins    Electronically Signed:  Renetta Johns PTA  Physical Therapist Assistant    [place] [facility] license [id number]

## 2022-06-13 ENCOUNTER — TREATMENT (OUTPATIENT)
Dept: PHYSICAL THERAPY | Facility: CLINIC | Age: 27
End: 2022-06-13

## 2022-06-13 DIAGNOSIS — R29.898 WEAKNESS OF LEFT LOWER EXTREMITY: ICD-10-CM

## 2022-06-13 DIAGNOSIS — M25.562 LEFT KNEE PAIN, UNSPECIFIED CHRONICITY: ICD-10-CM

## 2022-06-13 DIAGNOSIS — S83.512A RUPTURE OF ANTERIOR CRUCIATE LIGAMENT OF LEFT KNEE, INITIAL ENCOUNTER: Primary | ICD-10-CM

## 2022-06-13 DIAGNOSIS — M25.662 DECREASED RANGE OF MOTION (ROM) OF LEFT KNEE: ICD-10-CM

## 2022-06-13 DIAGNOSIS — R26.9 GAIT DISTURBANCE: ICD-10-CM

## 2022-06-13 PROCEDURE — 97110 THERAPEUTIC EXERCISES: CPT | Performed by: PHYSICAL THERAPIST

## 2022-06-13 PROCEDURE — 97530 THERAPEUTIC ACTIVITIES: CPT | Performed by: PHYSICAL THERAPIST

## 2022-06-13 NOTE — PROGRESS NOTES
Physical Therapy Daily Progress Note    Patient: Zhane Traylor   : 1995  Diagnosis/ICD-10 Code:  Rupture of anterior cruciate ligament of left knee, initial encounter [S83.512A]  Referring practitioner: Celestino Bojorquez*  Date of Initial Visit: Type: THERAPY  Noted: 2022  Today's Date: 2022  Patient seen for 22 sessions           Subjective Evaluation    History of Present Illness  Mechanism of injury: Pt reporting her incisions were sore this weekend. She did walk a lot over the weekend.    Pain  Current pain ratin           Objective   See Exercise, Manual, and Modality Logs for complete treatment.       Assessment & Plan     Assessment    Assessment details: Pt tolerated today's session well, continued to progress strengthening today. Pt would benefit from continued skilled therapy to address deficits, especially endurance and weakness in her legs. Progress per plan of care.                 Manual Therapy:    0     mins  21374;  Therapeutic Exercise:    24     mins  62127;     Neuromuscular Lance:    0    mins  00772;    Therapeutic Activity:     10     mins  48047;     Gait Trainin     mins  43804;     Ultrasound:     0     mins  69048;    Electrical Stimulation:    0     mins  62743 ( );  Dry Needling     0     mins self-pay;  Aquatic Therapy    0     mins  52835;  Mechanical Traction    0     mins  33329  Moist Heat     0     mins  No charge    Timed Treatment:   34   mins   Total Treatment:     34   mins    Lit Chopra PT  Physical Therapist    Electronically signed 2022    [place] License: PT - [id number]

## 2022-06-16 ENCOUNTER — TELEPHONE (OUTPATIENT)
Dept: PHYSICAL THERAPY | Facility: OTHER | Age: 27
End: 2022-06-16

## 2022-06-21 ENCOUNTER — TELEPHONE (OUTPATIENT)
Dept: PHYSICAL THERAPY | Facility: OTHER | Age: 27
End: 2022-06-21

## 2022-08-05 ENCOUNTER — OFFICE VISIT (OUTPATIENT)
Dept: ORTHOPEDIC SURGERY | Facility: CLINIC | Age: 27
End: 2022-08-05

## 2022-08-05 VITALS — HEART RATE: 104 BPM | HEIGHT: 66 IN | OXYGEN SATURATION: 97 % | BODY MASS INDEX: 44.32 KG/M2 | WEIGHT: 275.8 LBS

## 2022-08-05 DIAGNOSIS — Z47.89 AFTERCARE FOLLOWING SURGERY OF THE MUSCULOSKELETAL SYSTEM: Primary | ICD-10-CM

## 2022-08-05 PROCEDURE — 99213 OFFICE O/P EST LOW 20 MIN: CPT | Performed by: PHYSICIAN ASSISTANT

## 2022-08-05 NOTE — PROGRESS NOTES
"Chief Complaint  Pain and Follow-up of the Left Knee    Subjective          Zhane Traylor is a 27 y.o. female  presents to [facility] for   History of Present Illness      Patient presents with her daughter for follow-up evaluation of left knee arthroscopic ACL reconstruction with allograft, 2022.  She states since her last visit on 2022 she got COVID and then she started a new job.  She missed some physical therapy visits but states that she was released from physical therapy she states insurance did not pay for any more visits and they told her she met all her goals.  She states she has normal activities of daily living, denies pain, denies difficulty with keeping up with her child and taking care of her child and family.  No new complaints today.      No Known Allergies     Social History     Socioeconomic History   • Marital status: Single   Tobacco Use   • Smoking status: Former Smoker     Packs/day: 0.50     Years: 6.00     Pack years: 3.00     Types: Cigarettes     Start date: 10/19/2010     Quit date: 10/19/2016     Years since quittin.7   • Smokeless tobacco: Never Used   Vaping Use   • Vaping Use: Never used   Substance and Sexual Activity   • Alcohol use: Not Currently     Comment: LAST DRINK 2021   • Drug use: Never   • Sexual activity: Defer        REVIEW OF SYSTEMS    Constitutional: Denies fevers, chills, weight loss  Cardiovascular: Denies chest pain, shortness of breath  Skin: Denies rashes, acute skin changes  Neurologic: Denies headache, loss of consciousness  MSK: Left knee pain      Objective   Vital Signs:   Pulse 104   Ht 167.6 cm (66\")   Wt 125 kg (275 lb 12.8 oz)   SpO2 97%   BMI 44.52 kg/m²     Body mass index is 44.52 kg/m².    Physical Exam    Left knee: Well-healed incisions, no erythema, no drainage, no ecchymosis, nontender to palpation, no pain with resisted range of motion, full range of motion with full extension, flexion 120, " stable to varus/valgus stress, stable anterior/posterior drawer 5 out of 5 strength, neurovascularly intact.    Procedures    Imaging Results (Most Recent)     None           Result Review :   The following data was reviewed by: AMY Marino on 08/05/2022:               Assessment and Plan    Diagnoses and all orders for this visit:    1. Aftercare following surgery of left knee arthroscopic ACL reconstruction with allograft, 2/21/2022 (Primary)        Discussed diagnosis and treatment options with the patient she was advised to continue activity as tolerated if any new or concerning symptoms occur follow-up sooner otherwise follow-up as needed, patient agreed    Call or return if worsening symptoms.    Follow Up   Return if symptoms worsen or fail to improve, for Recheck.  Patient was given instructions and counseling regarding her condition or for health maintenance advice. Please see specific information pulled into the AVS if appropriate.     EMR Dragon/Transcription disclaimer:  Much of this encounter note is an electronic transcription/translation of spoken language to printed text, aka voice recognition.  The electronic translation of spoken language may permit erroneous or at times nonsensical words or phrases to be inadvertently transcribed; although I have reviewed the note for such errors, some may still exist so please interpret based on surrounding text content.

## 2022-08-10 RX ORDER — LEVALBUTEROL TARTRATE 45 UG/1
1-2 AEROSOL, METERED ORAL EVERY 4 HOURS PRN
Qty: 15 G | Refills: 1 | OUTPATIENT
Start: 2022-08-10

## 2022-08-10 RX ORDER — LORAZEPAM 1 MG/1
TABLET ORAL
Qty: 90 TABLET | Refills: 0 | OUTPATIENT
Start: 2022-08-10

## 2022-08-10 NOTE — TELEPHONE ENCOUNTER
Caller: [facility]    Relationship: Pharmacy    Best call back number: [phone]    Requested Prescriptions:   Requested Prescriptions     Pending Prescriptions Disp Refills   • levalbuterol (XOPENEX HFA) 45 MCG/ACT inhaler 15 g 1     Sig: Inhale 1-2 puffs Every 4 (Four) Hours As Needed for Wheezing or Shortness of Air.        Pharmacy where request should be sent: [facility]     Does the patient have less than a 3 day supply:  [] Yes  [x] No    Mya Cordero Rep   08/10/22 14:21 EDT

## 2022-08-15 ENCOUNTER — TELEPHONE (OUTPATIENT)
Dept: OBSTETRICS AND GYNECOLOGY | Facility: CLINIC | Age: 27
End: 2022-08-15

## 2022-08-15 NOTE — TELEPHONE ENCOUNTER
Patient called left message on voice mail needed to schedule her annual with Dr Gann.  Returned patient call no answer.

## 2022-08-24 ENCOUNTER — DOCUMENTATION (OUTPATIENT)
Dept: PHYSICAL THERAPY | Facility: CLINIC | Age: 27
End: 2022-08-24

## 2022-09-14 ENCOUNTER — HOSPITAL ENCOUNTER (OUTPATIENT)
Dept: RESPIRATORY THERAPY | Facility: HOSPITAL | Age: 27
Discharge: HOME | End: 2022-09-14
Admitting: NURSE PRACTITIONER

## 2022-09-14 DIAGNOSIS — J45.21 MILD INTERMITTENT ASTHMA WITH EXACERBATION: ICD-10-CM

## 2022-09-14 PROCEDURE — 94060 EVALUATION OF WHEEZING: CPT

## 2022-09-14 PROCEDURE — 94726 PLETHYSMOGRAPHY LUNG VOLUMES: CPT | Performed by: INTERNAL MEDICINE

## 2022-09-14 PROCEDURE — 94726 PLETHYSMOGRAPHY LUNG VOLUMES: CPT

## 2022-09-14 PROCEDURE — 94060 EVALUATION OF WHEEZING: CPT | Performed by: INTERNAL MEDICINE

## 2022-09-14 RX ORDER — ALBUTEROL SULFATE 2.5 MG/3ML
2.5 SOLUTION RESPIRATORY (INHALATION) ONCE
Status: COMPLETED | OUTPATIENT
Start: 2022-09-14 | End: 2022-09-14

## 2022-09-14 RX ADMIN — ALBUTEROL SULFATE 2.5 MG: 2.5 SOLUTION RESPIRATORY (INHALATION) at 16:56

## 2022-09-18 DIAGNOSIS — R94.2 ABNORMAL PFT: Primary | ICD-10-CM

## 2022-09-20 ENCOUNTER — HOSPITAL ENCOUNTER (OUTPATIENT)
Dept: MRI IMAGING | Facility: HOSPITAL | Age: 27
Discharge: HOME | End: 2022-09-20
Admitting: ORTHOPAEDIC SURGERY

## 2022-09-20 ENCOUNTER — OFFICE VISIT (OUTPATIENT)
Dept: ORTHOPEDIC SURGERY | Facility: CLINIC | Age: 27
End: 2022-09-20

## 2022-09-20 VITALS — BODY MASS INDEX: 44.2 KG/M2 | WEIGHT: 275 LBS | HEIGHT: 66 IN | HEART RATE: 110 BPM | OXYGEN SATURATION: 99 %

## 2022-09-20 DIAGNOSIS — M25.561 RIGHT KNEE PAIN, UNSPECIFIED CHRONICITY: Primary | ICD-10-CM

## 2022-09-20 PROCEDURE — 73721 MRI JNT OF LWR EXTRE W/O DYE: CPT

## 2022-09-20 PROCEDURE — 99213 OFFICE O/P EST LOW 20 MIN: CPT | Performed by: ORTHOPAEDIC SURGERY

## 2022-09-20 NOTE — PROGRESS NOTES
"Chief Complaint  Initial Evaluation of the Right Knee     Subjective      Zhane Traylor presents to [facility] for evaluation of the right knee. The patient reports she had a pop in her knee when going down the stairs and twisted her knee on 2022. She reports pain to the medial side of the knee. She is ambulating with crutches. She reports severe pain last night.     No Known Allergies     Social History     Socioeconomic History   • Marital status: Single   Tobacco Use   • Smoking status: Former Smoker     Packs/day: 0.50     Years: 6.00     Pack years: 3.00     Types: Cigarettes     Start date: 10/19/2010     Quit date: 10/19/2016     Years since quittin.9   • Smokeless tobacco: Never Used   Vaping Use   • Vaping Use: Never used   Substance and Sexual Activity   • Alcohol use: Not Currently     Comment: LAST DRINK 2021   • Drug use: Never   • Sexual activity: Defer        Review of Systems     Objective   Vital Signs:   Pulse 110   Ht 167.6 cm (66\")   Wt 125 kg (275 lb)   SpO2 99%   BMI 44.39 kg/m²       Physical Exam  Constitutional:       Appearance: Normal appearance. The patient is well-developed and normal weight.   HENT:      Head: Normocephalic.      Right Ear: Hearing and external ear normal.      Left Ear: Hearing and external ear normal.      Nose: Nose normal.   Eyes:      Conjunctiva/sclera: Conjunctivae normal.   Cardiovascular:      Rate and Rhythm: Normal rate.   Pulmonary:      Effort: Pulmonary effort is normal.      Breath sounds: No wheezing or rales.   Abdominal:      Palpations: Abdomen is soft.      Tenderness: There is no abdominal tenderness.   Musculoskeletal:      Cervical back: Normal range of motion.   Skin:     Findings: No rash.   Neurological:      Mental Status: The patient is alert and oriented to person, place, and time.   Psychiatric:         Mood and Affect: Mood and affect normal.         Judgment: Judgment normal.       Ortho Exam "      Right knee- ROM 0-120 degrees. 1+ laxity to anterior drawer, mild laxity to Lachman's. Pain with Dinesh's. Tender to the medial joint line. Stable to posterior drawer. Stable to varus/valgus stress. Positive EHL, FHL, GS and TA. Sensation intact to all 5 nerves of the foot. Positive pulses.     Procedures    X-Ray Report:  Right knee(s) X-Ray  Indication: Evaluation of right knee pain  AP, Lateral, Standing and Sunrise view(s)  Findings: no acute fracture. Mild degenerative changes to the patella femoral joint. No acute fracture. Mild medial joint space narrowing. No effusion.   Prior studies available for comparison: yes        Imaging Results (Most Recent)     Procedure Component Value Units Date/Time    XR Knee 3 View Right [[id number]] Resulted: 09/20/22 1457     Updated: 09/20/22 1457    Narrative:      X-Ray Report:  Right knee(s) X-Ray  Indication: Evaluation of right knee pain  AP, Lateral, Standing and Sunrise view(s)  Findings: no acute fracture. Mild degenerative changes to the patella   femoral joint. No acute fracture. Mild medial joint space narrowing. No   effusion.   Prior studies available for comparison: yes             Result Review :       Full Pulmonary Function Test With Bronchodilator    Result Date: 9/15/2022  Narrative: PFT interpretation Spirometry is normal. FEV1/FVC is 84. FEV1 is 3.75 liters, 108 percent of predicted. FVC is 4.48 liters, 110 percent of predicted. There is no significant response to bronchodilator administration. Lung volumes: Lung volumes show hyperinflation and air trapping. Total lung capacity is 7.37 liters, 136 percent of predicted. Residual volume is 2.78 liters, 195 percent of predicted. Diffusion capacity is 39.56 mL/min/mmHg, 136 percent of predicted. Flow volume loop is normal. Comparision: No previous study for comparison. Conclusion: Normal spirometry with hyperinflation, air trapping and supranormal diffusion capacity. Overall normal study.  Hyperinflation and air trapping could be related to early obstructive airway disease. Supranormal diffusion capacity can be seen in reactive airway disease. Please correlate clinically.    XR Knee 3 View Right    Result Date: 9/20/2022  Narrative: X-Ray Report: Right knee(s) X-Ray Indication: Evaluation of right knee pain AP, Lateral, Standing and Sunrise view(s) Findings: no acute fracture. Mild degenerative changes to the patella femoral joint. No acute fracture. Mild medial joint space narrowing. No effusion. Prior studies available for comparison: yes              Assessment and Plan     Diagnoses and all orders for this visit:    1. Right knee pain, unspecified chronicity (Primary)  -     XR Knee 3 View Right        Discussed the treatment plan with the patient.  I reviewed the x-rays that were obtained today with the patient. Plan for MRI of the right knee to evaluate for internal derangement. Plan to continue crutch and brace.     Call or return if worsening symptoms.    Follow Up     MRI results      Patient was given instructions and counseling regarding her condition or for health maintenance advice. Please see specific information pulled into the AVS if appropriate.     Scribed for Troy Hsu MD by Luisa Wesley.  09/20/22   14:50 EDT    I have personally performed the services described in this document as scribed by the above individual and it is both accurate and complete. Troy Hsu MD 09/21/22

## 2022-09-23 ENCOUNTER — OFFICE VISIT (OUTPATIENT)
Dept: ORTHOPEDIC SURGERY | Facility: CLINIC | Age: 27
End: 2022-09-23

## 2022-09-23 VITALS — HEART RATE: 84 BPM | OXYGEN SATURATION: 98 % | WEIGHT: 284 LBS | BODY MASS INDEX: 45.64 KG/M2 | HEIGHT: 66 IN

## 2022-09-23 DIAGNOSIS — T14.8XXA LIGAMENT TEAR: ICD-10-CM

## 2022-09-23 DIAGNOSIS — S83.411A SPRAIN OF MEDIAL COLLATERAL LIGAMENT OF RIGHT KNEE, INITIAL ENCOUNTER: Primary | ICD-10-CM

## 2022-09-23 PROCEDURE — 99213 OFFICE O/P EST LOW 20 MIN: CPT | Performed by: ORTHOPAEDIC SURGERY

## 2022-09-23 NOTE — PROGRESS NOTES
"Chief Complaint  Follow-up of the Right Knee     Subjective      Zhane Traylor presents to [facility] for follow up evaluation of the right knee. The patient recently had an MRI and is here today for those results. To review, The patient reports she had a pop in her knee when going down the stairs and twisted her knee on 2022. She reports pain to the medial side of the knee.  She reports her pain is improved and she is wearing a knee brace.     No Known Allergies     Social History     Socioeconomic History   • Marital status: Single   Tobacco Use   • Smoking status: Former Smoker     Packs/day: 0.50     Years: 6.00     Pack years: 3.00     Types: Cigarettes     Start date: 10/19/2010     Quit date: 10/19/2016     Years since quittin.9   • Smokeless tobacco: Never Used   Vaping Use   • Vaping Use: Never used   Substance and Sexual Activity   • Alcohol use: Not Currently     Comment: LAST DRINK 2021   • Drug use: Never   • Sexual activity: Defer        Review of Systems     Objective   Vital Signs:   Pulse 84   Ht 167.6 cm (66\")   Wt 129 kg (284 lb)   SpO2 98%   BMI 45.84 kg/m²       Physical Exam  Constitutional:       Appearance: Normal appearance. The patient is well-developed and normal weight.   HENT:      Head: Normocephalic.      Right Ear: Hearing and external ear normal.      Left Ear: Hearing and external ear normal.      Nose: Nose normal.   Eyes:      Conjunctiva/sclera: Conjunctivae normal.   Cardiovascular:      Rate and Rhythm: Normal rate.   Pulmonary:      Effort: Pulmonary effort is normal.      Breath sounds: No wheezing or rales.   Abdominal:      Palpations: Abdomen is soft.      Tenderness: There is no abdominal tenderness.   Musculoskeletal:      Cervical back: Normal range of motion.   Skin:     Findings: No rash.   Neurological:      Mental Status: The patient is alert and oriented to person, place, and time.   Psychiatric:         Mood and " Affect: Mood and affect normal.         Judgment: Judgment normal.       Ortho Exam      Right knee- ROM 0-120 degrees. Stable to varus/valgus stress. Stable to anterior/posterior drawer. No pain with patella translation. Neurovascularly intact. Positive EHL, FHL, GS and TA. Sensation intact to all 5 nerves of the foot. Positive pulses.     Procedures      Imaging Results (Most Recent)     None           Result Review :       Full Pulmonary Function Test With Bronchodilator    Result Date: 9/15/2022  Narrative: PFT interpretation Spirometry is normal. FEV1/FVC is 84. FEV1 is 3.75 liters, 108 percent of predicted. FVC is 4.48 liters, 110 percent of predicted. There is no significant response to bronchodilator administration. Lung volumes: Lung volumes show hyperinflation and air trapping. Total lung capacity is 7.37 liters, 136 percent of predicted. Residual volume is 2.78 liters, 195 percent of predicted. Diffusion capacity is 39.56 mL/min/mmHg, 136 percent of predicted. Flow volume loop is normal. Comparision: No previous study for comparison. Conclusion: Normal spirometry with hyperinflation, air trapping and supranormal diffusion capacity. Overall normal study. Hyperinflation and air trapping could be related to early obstructive airway disease. Supranormal diffusion capacity can be seen in reactive airway disease. Please correlate clinically.    XR Knee 3 View Right    Result Date: 9/20/2022  Narrative: X-Ray Report: Right knee(s) X-Ray Indication: Evaluation of right knee pain AP, Lateral, Standing and Sunrise view(s) Findings: no acute fracture. Mild degenerative changes to the patella femoral joint. No acute fracture. Mild medial joint space narrowing. No effusion. Prior studies available for comparison: yes     MRI Knee Right Without Contrast    Result Date: 9/21/2022  Narrative: PROCEDURE: MRI KNEE RIGHT  WO CONTRAST  COMPARISON: None  INDICATIONS: MEDIAL RIGHT KNEE PAIN AFTER TWISTING INJURY YESTERDAY.       TECHNIQUE: A complete multi-planar MRI was performed.   FINDINGS:  No fracture or focal osseous lesion is identified.  Marrow signal appears normal.  The patella is subluxed laterally 0.9 cm.  No meniscal tear is seen.  The cruciate ligaments are intact.  There is a moderate amount of edema surrounding the medial collateral ligament which itself appears intact consistent with a grade 1 sprain.  The lateral collateral ligament complex, lateral patellar retinaculum and extensor mechanism all appear intact.  There are tears of the posterior patellofemoral ligament and patellar retinaculum at their femoral insertions.  A small knee joint effusion is noted.  Cartilage in the joint is intact.  No loose body is evident.  No popliteal cyst is seen.      Impression:   1. Tears of the posterior aspects of the medial patellar retinaculum and medial patellofemoral ligament 2. Grade 1 MCL sprain 3. Small knee joint effusion     Afshin Rose M.D.       Electronically Signed and Approved By: Afshin Rose M.D. on 9/21/2022 at 12:43                      Assessment and Plan     Diagnoses and all orders for this visit:    1. Sprain of medial collateral ligament of right knee, initial encounter (Primary)    2. MPFL tear        Discussed the treatment plan with the patient.  Plan to continue knee brace. Order for physical therapy given today.     Call or return if worsening symptoms.    Follow Up     6 weeks      Patient was given instructions and counseling regarding her condition or for health maintenance advice. Please see specific information pulled into the AVS if appropriate.     Scribed for Troy Hsu MD by Luisa Wesley.  09/23/22   11:13 EDT    I have personally performed the services described in this document as scribed by the above individual and it is both accurate and complete. Troy Hsu MD 09/25/22

## 2022-10-07 ENCOUNTER — OFFICE VISIT (OUTPATIENT)
Dept: FAMILY MEDICINE CLINIC | Facility: CLINIC | Age: 27
End: 2022-10-07

## 2022-10-07 VITALS
TEMPERATURE: 96.3 F | DIASTOLIC BLOOD PRESSURE: 100 MMHG | BODY MASS INDEX: 45.66 KG/M2 | SYSTOLIC BLOOD PRESSURE: 162 MMHG | HEART RATE: 113 BPM | WEIGHT: 284.1 LBS | OXYGEN SATURATION: 99 % | HEIGHT: 66 IN

## 2022-10-07 DIAGNOSIS — R30.0 DYSURIA: ICD-10-CM

## 2022-10-07 DIAGNOSIS — R53.83 OTHER FATIGUE: ICD-10-CM

## 2022-10-07 DIAGNOSIS — I10 PRIMARY HYPERTENSION: ICD-10-CM

## 2022-10-07 DIAGNOSIS — J45.21 MILD INTERMITTENT ASTHMA WITH EXACERBATION: Primary | ICD-10-CM

## 2022-10-07 DIAGNOSIS — E61.1 IRON DEFICIENCY: ICD-10-CM

## 2022-10-07 DIAGNOSIS — R73.03 BORDERLINE DIABETES: ICD-10-CM

## 2022-10-07 DIAGNOSIS — Z23 NEED FOR DIPHTHERIA-TETANUS-PERTUSSIS (TDAP) VACCINE: ICD-10-CM

## 2022-10-07 DIAGNOSIS — R00.0 TACHYCARDIA: ICD-10-CM

## 2022-10-07 DIAGNOSIS — R82.90 BAD ODOR OF URINE: ICD-10-CM

## 2022-10-07 DIAGNOSIS — E55.9 VITAMIN D DEFICIENCY: ICD-10-CM

## 2022-10-07 DIAGNOSIS — Z23 NEED FOR INFLUENZA VACCINATION: ICD-10-CM

## 2022-10-07 LAB
BILIRUB BLD-MCNC: NEGATIVE MG/DL
CANDIDA SPECIES: NEGATIVE
CLARITY, POC: CLEAR
COLOR UR: YELLOW
EXPIRATION DATE: NORMAL
GARDNERELLA VAGINALIS: NEGATIVE
GLUCOSE UR STRIP-MCNC: NEGATIVE MG/DL
KETONES UR QL: NEGATIVE
LEUKOCYTE EST, POC: NEGATIVE
Lab: NORMAL
NITRITE UR-MCNC: NEGATIVE MG/ML
PH UR: 5 [PH] (ref 5–8)
PROT UR STRIP-MCNC: NEGATIVE MG/DL
RBC # UR STRIP: NEGATIVE /UL
SP GR UR: 1.02 (ref 1–1.03)
T VAGINALIS DNA VAG QL PROBE+SIG AMP: NEGATIVE
UROBILINOGEN UR QL: NORMAL

## 2022-10-07 PROCEDURE — 90472 IMMUNIZATION ADMIN EACH ADD: CPT | Performed by: NURSE PRACTITIONER

## 2022-10-07 PROCEDURE — 90471 IMMUNIZATION ADMIN: CPT | Performed by: NURSE PRACTITIONER

## 2022-10-07 PROCEDURE — 87510 GARDNER VAG DNA DIR PROBE: CPT | Performed by: NURSE PRACTITIONER

## 2022-10-07 PROCEDURE — 87660 TRICHOMONAS VAGIN DIR PROBE: CPT | Performed by: NURSE PRACTITIONER

## 2022-10-07 PROCEDURE — 99215 OFFICE O/P EST HI 40 MIN: CPT | Performed by: NURSE PRACTITIONER

## 2022-10-07 PROCEDURE — 87480 CANDIDA DNA DIR PROBE: CPT | Performed by: NURSE PRACTITIONER

## 2022-10-07 PROCEDURE — 90715 TDAP VACCINE 7 YRS/> IM: CPT | Performed by: NURSE PRACTITIONER

## 2022-10-07 PROCEDURE — 90686 IIV4 VACC NO PRSV 0.5 ML IM: CPT | Performed by: NURSE PRACTITIONER

## 2022-10-07 RX ORDER — LEVALBUTEROL TARTRATE 45 UG/1
1-2 AEROSOL, METERED ORAL EVERY 4 HOURS PRN
Qty: 15 G | Refills: 5 | Status: SHIPPED | OUTPATIENT
Start: 2022-10-07

## 2022-10-07 RX ORDER — MONTELUKAST SODIUM 10 MG/1
10 TABLET ORAL NIGHTLY
Qty: 30 TABLET | Refills: 5 | Status: SHIPPED | OUTPATIENT
Start: 2022-10-07

## 2022-10-07 RX ORDER — FLUTICASONE PROPIONATE AND SALMETEROL 250; 50 UG/1; UG/1
1 POWDER RESPIRATORY (INHALATION)
Qty: 60 EACH | Refills: 3 | Status: SHIPPED | OUTPATIENT
Start: 2022-10-07

## 2022-10-07 RX ORDER — AMLODIPINE BESYLATE 5 MG/1
5 TABLET ORAL DAILY
Qty: 30 TABLET | Refills: 1 | Status: SHIPPED | OUTPATIENT
Start: 2022-10-07 | End: 2022-11-04 | Stop reason: SDUPTHER

## 2022-10-07 NOTE — ASSESSMENT & PLAN NOTE
Hypertension is newly identified.  I will start her on amlodipine 5 mg daily.  Blood pressure will be reassessed in 4 weeks.

## 2022-10-07 NOTE — ASSESSMENT & PLAN NOTE
Asthma is worsening.  The patient is experiencing weekly daytime asthma symptoms. She is experiencing frequent nighttime asthma symptoms.  Asthma information handout given.  Discussed monitoring symptoms and use of quick-relief medications and contacting us early in the course of exacerbations.  Warning signs of respiratory distress were reviewed with the patient.   Counseled to void exposure to cigarette smoke.  Medications: continue Advair inhaler daily, Xopenex inhaler as needed and resume Singulair every evening.  Discussed medication dosage, use, side effects, and goals of treatment in detail.    Discussed technique for using MDIs and/or nebulizer.  Follow up in 1 month, or sooner should new symptoms or problems arise.

## 2022-10-07 NOTE — PROGRESS NOTES
"Chief Complaint  Depression, Difficulty Urinating ([place]), Asthma, and Hypertension (Elevated BP )    Subjective          Zhane Traylor, 27 y.o. female presents to [facility]  History of Present Illness   She presents today for follow-up and to establish care.  She is a previous patient of CLEO Bright.    Asthma: She is following up due to asthma not being well controlled.  She states she has had to use her Xopenex inhaler more.  She also uses Advair inhaler.  She used to be on Singulair but has been out of it for a long time.    Her blood pressure is noted to be very high today in the office.  She states she has had high blood pressure for years.  But she is never been treated.  She also was noted to have an elevated heart rate of 113.  She states she monitors her heart rate with her apple watch and it is always high.  She is not on any medications for blood pressure and heart rate.  She states she has had previous cardiac work-up including EKGs which were normal.    She states she has chronic fatigue.  She has had multiple work-up in the past, no thyroid issues.  She does have vitamin D deficiency and is currently taking an over-the-counter vitamin D but does not remember the dose.  She also has iron deficiency and takes an over-the-counter iron tablet.    She is obese with a BMI of 45.  She does have borderline diabetes although her last A1c was normal at 5.3%.    She is also complaining of dysuria but was unable to urinate when she initially came in the office.  She was given some water and is able to leave a urine specimen now.  Objective   Vital Signs:   /100   Pulse 113   Temp 96.3 °F (35.7 °C)   Ht 167.6 cm (66\")   Wt 129 kg (284 lb 1.6 oz)   SpO2 99%   BMI 45.85 kg/m²     Current Outpatient Medications on File Prior to Visit   Medication Sig Dispense Refill   • cetirizine (zyrTEC) 10 MG tablet Zyrtec 10 mg oral tablet take 1 tablet (10 mg) by " oral route once daily   Active     • clonazePAM (KlonoPIN) 1 MG tablet take 1 tablet by mouth 3 times a day 90 tablet 0   • Ergocalciferol (VITAMIN D2 PO) Take 1 capsule by mouth Daily.     • Etonogestrel (Nexplanon) 68 MG implant subdermal implant Inject 68 mg into the appropriate area of the skin as directed by provider.     • Ferrous Gluconate (IRON 27 PO) Take 1 tablet by mouth Daily.     • lamoTRIgine (LaMICtal) 100 MG tablet take 1 tabletby mouth twice daily 60 tablet 3   • Latuda 40 MG tablet tablet Take 1 tablet by mouth every night at bedtime. 30 tablet 2   • ramelteon (Rozerem) 8 MG tablet take 1 tablet by mouth at bedtime 30 tablet 3   • Retin-A 0.025 % cream apply daily after washing 45 g 2   • spironolactone (ALDACTONE) 25 MG tablet Take 1 tablet by mouth 3 (Three) Times a Day. 90 tablet 5   • tretinoin microspheres (Retin-A Micro) 0.04 % gel Apply a pea-size amount to face every other day 45 g 2   • [DISCONTINUED] Fluticasone-Salmeterol (ADVAIR) 250-50 MCG/ACT DISKUS Inhale 1 puff 2 (Two) Times a Day. For asthma. Rinse mouth after use 60 each 0   • [DISCONTINUED] levalbuterol (XOPENEX HFA) 45 MCG/ACT inhaler Inhale 1-2 puffs Every 4 (Four) Hours As Needed for Wheezing or Shortness of Air. 15 g 1   • [DISCONTINUED] clorazepate (TRANXENE) 15 MG tablet Take 1 by mouth  twice per day 60 tablet 0   • [DISCONTINUED] lamoTRIgine (LaMICtal) 100 MG tablet Take 1 tablet by mouth 2 (Two) Times a Day. 60 tablet 3   • [DISCONTINUED] LORazepam (Ativan) 0.5 MG tablet Take 1 tablet by mouth 3 (Three) Times a Day As Needed for Anxiety. 90 tablet 0   • [DISCONTINUED] LORazepam (Ativan) 0.5 MG tablet Take 1 tablet by mouth 3 times a day as needed for anxiety 90 tablet 0   • [DISCONTINUED] LORazepam (Ativan) 1 MG tablet Take one tablet 3 times a day as needed. 90 tablet 0   • [DISCONTINUED] LORazepam (Ativan) 1 MG tablet Take 1 tablet by mouth 3 (Three) Times a Day As Needed for anxiety. 90 tablet 0   • [DISCONTINUED]  LORazepam (Ativan) 1 MG tablet Take 1 tabelt by mouth three times daily as needed for anxiety 90 tablet 0   • [DISCONTINUED] lurasidone (Latuda) 40 MG tablet tablet Take 1 tablet by mouth  at bedtime 30 tablet 3   • [DISCONTINUED] lurasidone (Latuda) 40 MG tablet tablet Take 1 tablet by mouth Every Night. 30 tablet 3     No current facility-administered medications on file prior to visit.     Past Medical History:   Diagnosis Date   • Alcoholism in remission ([facility]) 02/05/2021   • Anxiety disorder 01/05/2021   • Asthma    • Constipation    • H/O psychiatric care    • Left anterior cruciate ligament tear    • Major depression 02/05/2021   • Obesity    • Onychomycosis    • PONV (postoperative nausea and vomiting)       Physical Exam  Vitals reviewed.   Constitutional:       Appearance: Normal appearance. She is well-developed. She is morbidly obese.   Neck:      Thyroid: No thyroid mass, thyromegaly or thyroid tenderness.   Cardiovascular:      Rate and Rhythm: Normal rate and regular rhythm.      Heart sounds: No murmur heard.    No friction rub. No gallop.   Pulmonary:      Effort: Pulmonary effort is normal.      Breath sounds: Normal breath sounds. No wheezing or rhonchi.   Lymphadenopathy:      Cervical: No cervical adenopathy.   Skin:     General: Skin is warm and dry.   Neurological:      Mental Status: She is alert and oriented to person, place, and time.      Cranial Nerves: No cranial nerve deficit.   Psychiatric:         Mood and Affect: Mood and affect normal.         Behavior: Behavior normal.         Thought Content: Thought content normal. Thought content does not include homicidal or suicidal ideation.         Judgment: Judgment normal.        Result Review :     CMP    CMP 3/2/22 3/7/22 5/24/22   Glucose 101 (A) 88 89   BUN 22 (A) 12 20   Creatinine 1.19 (A) 1.10 (A) 0.88   Sodium 135 (A) 137 142   Potassium 3.6 4.0 4.1   Chloride 101 102 107   Calcium 9.6 9.8 9.2   Albumin 4.20 4.40 4.30   Total  Bilirubin 0.3 0.3 0.2   Alkaline Phosphatase 81 91 90   AST (SGOT) 16 17 13   ALT (SGPT) 16 17 19   (A) Abnormal value            CBC    CBC 3/2/22 3/7/22 5/24/22   WBC 8.13 5.64 7.99   RBC 4.98 5.07 5.05   Hemoglobin 13.6 14.1 14.0   Hematocrit 42.8 44.3 43.9   MCV 85.9 87.4 86.9   MCH 27.3 27.8 27.7   MCHC 31.8 31.8 31.9   RDW 13.9 14.0 13.2   Platelets 287 280 302           Lipid Panel    Lipid Panel 5/24/22   Total Cholesterol 157   Triglycerides 81   HDL Cholesterol 46   VLDL Cholesterol 15   LDL Cholesterol  96   LDL/HDL Ratio 2.06           TSH    TSH 5/24/22   TSH 1.910           A1C Last 3 Results    HGBA1C Last 3 Results 5/24/22   Hemoglobin A1C 5.30                POCT urinalysis dipstick, automated  Order: [id number]   Status: Final result     Visible to patient: No (scheduled for 10/8/2022  6:41 AM)     Dx: Dysuria    Specimen Information: Urine         0 Result Notes    Component   Ref Range & Units 16:38   (10/7/22) 4 mo ago   (5/13/22) 5 mo ago   (4/15/22) 5 mo ago   (4/15/22) 6 mo ago   (3/14/22) 6 mo ago   (3/14/22) 7 mo ago   (3/7/22)   Color   Yellow, Straw, Dark Yellow, Susana Yellow  Yellow   Yellow   Yellow  Yellow R    Clarity, UA   Clear Clear  Clear   Clear   Clear  Cloudy Abnormal     Specific Gravity    1.005 - 1.030 1.020  1.030   1.015   1.030  1.020    pH, Urine   5.0 - 8.0 5.0  5.5   6.5   5.5  5.5    Leukocytes   Negative Negative  Negative   Negative   Negative  Trace Abnormal     Nitrite, UA   Negative Negative  Negative   Negative   Negative  Negative    Protein, POC   Negative mg/dL Negative  Negative   Negative   Negative  Negative R    Glucose, UA   Negative mg/dL Negative  Negative R   Negative R   Negative R  Negative R    Ketones, UA   Negative Negative  Negative   Negative   Negative  Negative    Urobilinogen, UA   Normal, 0.2 E.U./dL 0.2 E.U./dL  Normal R   Normal R   Normal R  0.2 E.U./dL R    Bilirubin   Negative Negative  Negative   Negative   Negative  Negative    Blood,  UA   Negative Negative  Negative   Trace Abnormal  CM   Negative  Negative    Lot Number  [id number]  [id number]  [id number]   [id number]  106,[id number]     Expiration Date  05/31/2023 12/31/22 5-31-23 04/03/2022 12/31/22     HCG, Urine, QL   Negative R        Internal Positive Control   Passed R        Internal Negative Control   Passed R        POC Influenza A, Molecular     Positive Abnormal  R      POC Influenza B, Molecular     Negative R      Internal Control     Passed R      Resulting Agency [facility]  [facility]              Specimen Collected: 10/07/22 16:38 Last Resulted: 10/07/22 16:38                Assessment and Plan    Diagnoses and all orders for this visit:    1. Mild intermittent asthma with exacerbation (Primary)  Assessment & Plan:  Asthma is worsening.  The patient is experiencing weekly daytime asthma symptoms. She is experiencing frequent nighttime asthma symptoms.  Asthma information handout given.  Discussed monitoring symptoms and use of quick-relief medications and contacting us early in the course of exacerbations.  Warning signs of respiratory distress were reviewed with the patient.   Counseled to void exposure to cigarette smoke.  Medications: continue Advair inhaler daily, Xopenex inhaler as needed and resume Singulair every evening.  Discussed medication dosage, use, side effects, and goals of treatment in detail.    Discussed technique for using MDIs and/or nebulizer.  Follow up in 1 month, or sooner should new symptoms or problems arise.        Orders:  -     Fluticasone-Salmeterol (ADVAIR/WIXELA) 250-50 MCG/ACT DISKUS; Inhale 1 puff 2 (Two) Times a Day. For asthma. Rinse mouth after use  Dispense: 60 each; Refill: 3  -     levalbuterol (XOPENEX HFA) 45 MCG/ACT inhaler; Inhale 1-2 puffs Every 4 (Four) Hours As Needed for  Wheezing or Shortness of Air.  Dispense: 15 g; Refill: 5  -     montelukast (Singulair) 10 MG tablet; Take 1 tablet by mouth Every Night.  Dispense: 30 tablet; Refill: 5    2. Primary hypertension  Assessment & Plan:  Hypertension is newly identified.  I will start her on amlodipine 5 mg daily.  Blood pressure will be reassessed in 4 weeks.    Orders:  -     amLODIPine (NORVASC) 5 MG tablet; Take 1 tablet by mouth Daily.  Dispense: 30 tablet; Refill: 1  -     Comprehensive Metabolic Panel; Future    3. Tachycardia  Assessment & Plan:  Tachycardia is newly identified.  Previous cardiac work-up has been negative.  Beta-blockers are contraindicated due to asthma.  I will start her on amlodipine to help with her blood pressure and tachycardia.    Orders:  -     amLODIPine (NORVASC) 5 MG tablet; Take 1 tablet by mouth Daily.  Dispense: 30 tablet; Refill: 1  -     Comprehensive Metabolic Panel; Future    4. Borderline diabetes  Assessment & Plan:  She will get labs next week.    Orders:  -     Hemoglobin A1c; Future    5. Vitamin D deficiency  Assessment & Plan:  Currently taking vitamin D supplement.  I will check a vitamin D level with her next labs in a week.    Orders:  -     Vitamin D 25 Hydroxy; Future    6. Iron deficiency  Assessment & Plan:  Currently taking iron tablet.  We will check iron levels with her labs next week.    Orders:  -     Iron Profile; Future  -     Ferritin; Future  -     CBC Auto Differential; Future    7. Other fatigue  -     Vitamin B12 & Folate; Future  -     Iron Profile; Future  -     Ferritin; Future  -     CBC Auto Differential; Future    8. Dysuria  Comments:  UA in office today is negative for UTI, no abnormalities.  We will obtain a vaginal swab.  Orders:  -     POCT urinalysis dipstick, automated  -     Gardnerella vaginalis, Trichomonas vaginalis, Candida albicans, DNA - Swab, Vagina    9. Bad odor of urine  Comments:  Vaginal swab obtained via patient, will call with  results.  Orders:  -     Gardnerella vaginalis, Trichomonas vaginalis, Candida albicans, DNA - Swab, Vagina    10. Need for diphtheria-tetanus-pertussis (Tdap) vaccine    11. Need for influenza vaccination  -     FluLaval/Fluarix/Fluzone >6 Months    Other orders  -     Tdap Vaccine Greater Than or Equal To 8yo IM    I spent 42 minutes caring for Zhane on this date of service. This time includes time spent by me in the following activities:preparing for the visit, reviewing tests, performing a medically appropriate examination and/or evaluation , counseling and educating the patient/family/caregiver, ordering medications, tests, or procedures and documenting information in the medical record  Follow Up   Return in about 4 weeks (around 11/4/2022) for Recheck new onset HTN and tachycardia.  Patient was given instructions and counseling regarding her condition or for health maintenance advice. Please see specific information pulled into the AVS if appropriate.       Sonya Squires, APRN  10/07/2022

## 2022-10-07 NOTE — ASSESSMENT & PLAN NOTE
Currently taking vitamin D supplement.  I will check a vitamin D level with her next labs in a week.

## 2022-10-07 NOTE — ASSESSMENT & PLAN NOTE
Tachycardia is newly identified.  Previous cardiac work-up has been negative.  Beta-blockers are contraindicated due to asthma.  I will start her on amlodipine to help with her blood pressure and tachycardia.

## 2022-11-02 ENCOUNTER — LAB (OUTPATIENT)
Dept: LAB | Facility: HOSPITAL | Age: 27
End: 2022-11-02

## 2022-11-02 DIAGNOSIS — I10 PRIMARY HYPERTENSION: ICD-10-CM

## 2022-11-02 DIAGNOSIS — E61.1 IRON DEFICIENCY: ICD-10-CM

## 2022-11-02 DIAGNOSIS — R53.83 OTHER FATIGUE: ICD-10-CM

## 2022-11-02 DIAGNOSIS — E55.9 VITAMIN D DEFICIENCY: ICD-10-CM

## 2022-11-02 DIAGNOSIS — R00.0 TACHYCARDIA: ICD-10-CM

## 2022-11-02 DIAGNOSIS — R73.03 BORDERLINE DIABETES: ICD-10-CM

## 2022-11-02 LAB
25(OH)D3 SERPL-MCNC: 28.6 NG/ML (ref 30–100)
ALBUMIN SERPL-MCNC: 4.2 G/DL (ref 3.5–5.2)
ALBUMIN/GLOB SERPL: 1.4 G/DL
ALP SERPL-CCNC: 81 U/L (ref 39–117)
ALT SERPL W P-5'-P-CCNC: 92 U/L (ref 1–33)
ANION GAP SERPL CALCULATED.3IONS-SCNC: 9 MMOL/L (ref 5–15)
AST SERPL-CCNC: 49 U/L (ref 1–32)
BASOPHILS # BLD AUTO: 0.07 10*3/MM3 (ref 0–0.2)
BASOPHILS NFR BLD AUTO: 1 % (ref 0–1.5)
BILIRUB SERPL-MCNC: 0.3 MG/DL (ref 0–1.2)
BUN SERPL-MCNC: 15 MG/DL (ref 6–20)
BUN/CREAT SERPL: 18.1 (ref 7–25)
CALCIUM SPEC-SCNC: 9.6 MG/DL (ref 8.6–10.5)
CHLORIDE SERPL-SCNC: 105 MMOL/L (ref 98–107)
CO2 SERPL-SCNC: 25 MMOL/L (ref 22–29)
CREAT SERPL-MCNC: 0.83 MG/DL (ref 0.57–1)
DEPRECATED RDW RBC AUTO: 41.6 FL (ref 37–54)
EGFRCR SERPLBLD CKD-EPI 2021: 99.9 ML/MIN/1.73
EOSINOPHIL # BLD AUTO: 0.2 10*3/MM3 (ref 0–0.4)
EOSINOPHIL NFR BLD AUTO: 3 % (ref 0.3–6.2)
ERYTHROCYTE [DISTWIDTH] IN BLOOD BY AUTOMATED COUNT: 13.1 % (ref 12.3–15.4)
FERRITIN SERPL-MCNC: 179 NG/ML (ref 13–150)
FOLATE SERPL-MCNC: 7 NG/ML (ref 4.78–24.2)
GLOBULIN UR ELPH-MCNC: 2.9 GM/DL
GLUCOSE SERPL-MCNC: 97 MG/DL (ref 65–99)
HBA1C MFR BLD: 5.7 % (ref 4.8–5.6)
HCT VFR BLD AUTO: 43.7 % (ref 34–46.6)
HGB BLD-MCNC: 14.1 G/DL (ref 12–15.9)
IMM GRANULOCYTES # BLD AUTO: 0.04 10*3/MM3 (ref 0–0.05)
IMM GRANULOCYTES NFR BLD AUTO: 0.6 % (ref 0–0.5)
IRON 24H UR-MRATE: 70 MCG/DL (ref 37–145)
IRON SATN MFR SERPL: 15 % (ref 20–50)
LYMPHOCYTES # BLD AUTO: 1.76 10*3/MM3 (ref 0.7–3.1)
LYMPHOCYTES NFR BLD AUTO: 26 % (ref 19.6–45.3)
MCH RBC QN AUTO: 28.3 PG (ref 26.6–33)
MCHC RBC AUTO-ENTMCNC: 32.3 G/DL (ref 31.5–35.7)
MCV RBC AUTO: 87.6 FL (ref 79–97)
MONOCYTES # BLD AUTO: 0.72 10*3/MM3 (ref 0.1–0.9)
MONOCYTES NFR BLD AUTO: 10.6 % (ref 5–12)
NEUTROPHILS NFR BLD AUTO: 3.98 10*3/MM3 (ref 1.7–7)
NEUTROPHILS NFR BLD AUTO: 58.8 % (ref 42.7–76)
NRBC BLD AUTO-RTO: 0 /100 WBC (ref 0–0.2)
PLATELET # BLD AUTO: 293 10*3/MM3 (ref 140–450)
PMV BLD AUTO: 9.9 FL (ref 6–12)
POTASSIUM SERPL-SCNC: 4.1 MMOL/L (ref 3.5–5.2)
PROT SERPL-MCNC: 7.1 G/DL (ref 6–8.5)
RBC # BLD AUTO: 4.99 10*6/MM3 (ref 3.77–5.28)
SODIUM SERPL-SCNC: 139 MMOL/L (ref 136–145)
TIBC SERPL-MCNC: 460 MCG/DL (ref 298–536)
TRANSFERRIN SERPL-MCNC: 309 MG/DL (ref 200–360)
VIT B12 BLD-MCNC: 401 PG/ML (ref 211–946)
WBC NRBC COR # BLD: 6.77 10*3/MM3 (ref 3.4–10.8)

## 2022-11-02 PROCEDURE — 85025 COMPLETE CBC W/AUTO DIFF WBC: CPT

## 2022-11-02 PROCEDURE — 83540 ASSAY OF IRON: CPT

## 2022-11-02 PROCEDURE — 82728 ASSAY OF FERRITIN: CPT

## 2022-11-02 PROCEDURE — 82306 VITAMIN D 25 HYDROXY: CPT

## 2022-11-02 PROCEDURE — 36415 COLL VENOUS BLD VENIPUNCTURE: CPT

## 2022-11-02 PROCEDURE — 83036 HEMOGLOBIN GLYCOSYLATED A1C: CPT

## 2022-11-02 PROCEDURE — 82607 VITAMIN B-12: CPT

## 2022-11-02 PROCEDURE — 82746 ASSAY OF FOLIC ACID SERUM: CPT

## 2022-11-02 PROCEDURE — 80053 COMPREHEN METABOLIC PANEL: CPT

## 2022-11-02 PROCEDURE — 84466 ASSAY OF TRANSFERRIN: CPT

## 2022-11-04 ENCOUNTER — OFFICE VISIT (OUTPATIENT)
Dept: FAMILY MEDICINE CLINIC | Facility: CLINIC | Age: 27
End: 2022-11-04

## 2022-11-04 VITALS
DIASTOLIC BLOOD PRESSURE: 89 MMHG | TEMPERATURE: 97.3 F | BODY MASS INDEX: 45.85 KG/M2 | WEIGHT: 285.3 LBS | OXYGEN SATURATION: 97 % | HEART RATE: 104 BPM | SYSTOLIC BLOOD PRESSURE: 136 MMHG | HEIGHT: 66 IN

## 2022-11-04 DIAGNOSIS — F10.11 HISTORY OF ALCOHOL ABUSE: ICD-10-CM

## 2022-11-04 DIAGNOSIS — E55.9 VITAMIN D DEFICIENCY: ICD-10-CM

## 2022-11-04 DIAGNOSIS — I10 PRIMARY HYPERTENSION: Primary | ICD-10-CM

## 2022-11-04 DIAGNOSIS — R79.89 ELEVATED LFTS: ICD-10-CM

## 2022-11-04 DIAGNOSIS — R73.03 BORDERLINE DIABETES: ICD-10-CM

## 2022-11-04 DIAGNOSIS — E66.01 CLASS 3 SEVERE OBESITY DUE TO EXCESS CALORIES WITH SERIOUS COMORBIDITY AND BODY MASS INDEX (BMI) OF 45.0 TO 49.9 IN ADULT: ICD-10-CM

## 2022-11-04 DIAGNOSIS — R00.0 TACHYCARDIA: ICD-10-CM

## 2022-11-04 PROCEDURE — 99215 OFFICE O/P EST HI 40 MIN: CPT | Performed by: NURSE PRACTITIONER

## 2022-11-04 RX ORDER — AMLODIPINE BESYLATE 10 MG/1
10 TABLET ORAL DAILY
Qty: 30 TABLET | Refills: 1 | Status: SHIPPED | OUTPATIENT
Start: 2022-11-04 | End: 2022-12-13 | Stop reason: SDUPTHER

## 2022-11-04 NOTE — PROGRESS NOTES
"Chief Complaint  Hypertension (Follow up)    Subjective          Zhane Traylor, 27 y.o. female presents to [facility]  History of Present Illness   She presents today for a 1 month follow-up on uncontrolled hypertension.  She was started on amlodipine 5 mg once daily.  Her blood pressure has improved but is still above goal at 136/89.  She is tachycardic with a heart rate of 104.  She has had some chronic tachycardia.  She states sometimes her heart does race at night and her apple watch has indicated that her heart rate has been above 120 for more than 20 minutes.  She did have her lab work-up done 2 days ago and wants to discuss the results.  She has had Holter monitor in the past and EKGs that were normal.    She states that she has had a history of alcohol abuse and has recently stopped drinking alcohol for about 2 weeks.  She has been doing outpatient therapy.  Her liver function levels were elevated on his last labs that was not elevated previously.    She does have borderline diabetes and her A1c is stable at 5.7%.    Her vitamin D level is 28 but has improved on vitamin D.  Objective   Vital Signs:   /89 (BP Location: Left arm, Patient Position: Sitting, Cuff Size: Adult)   Pulse 104   Temp 97.3 °F (36.3 °C)   Ht 167.6 cm (66\")   Wt 129 kg (285 lb 4.8 oz)   SpO2 97%   BMI 46.05 kg/m²     Current Outpatient Medications on File Prior to Visit   Medication Sig Dispense Refill   • cetirizine (zyrTEC) 10 MG tablet Zyrtec 10 mg oral tablet take 1 tablet (10 mg) by oral route once daily   Active     • clonazePAM (KlonoPIN) 1 MG tablet Take 1 tablet by mouth 3 (Three) Times a Day. 90 tablet 0   • Ergocalciferol (VITAMIN D2 PO) Take 1 capsule by mouth Daily.     • Etonogestrel (Nexplanon) 68 MG implant subdermal implant Inject 68 mg into the appropriate area of the skin as directed by provider.     • Fluticasone-Salmeterol (ADVAIR/WIXELA) 250-50 MCG/ACT DISKUS Inhale 1 " puff 2 (Two) Times a Day. For asthma. Rinse mouth after use 60 each 3   • lamoTRIgine (LaMICtal) 100 MG tablet take 1 tabletby mouth twice daily 60 tablet 3   • Latuda 40 MG tablet tablet Take 1 tablet by mouth every night at bedtime. 30 tablet 2   • levalbuterol (XOPENEX HFA) 45 MCG/ACT inhaler Inhale 1-2 puffs Every 4 (Four) Hours As Needed for Wheezing or Shortness of Air. 15 g 5   • lurasidone (Latuda) 40 MG tablet tablet take 1 tablet by mouth every night 30 tablet 3   • montelukast (Singulair) 10 MG tablet Take 1 tablet by mouth Every Night. 30 tablet 5   • ramelteon (Rozerem) 8 MG tablet take 1 tablet by mouth at bedtime 30 tablet 3   • Retin-A 0.025 % cream apply daily after washing 45 g 2   • spironolactone (ALDACTONE) 25 MG tablet Take 1 tablet by mouth 3 (Three) Times a Day. 90 tablet 5   • tretinoin microspheres (Retin-A Micro) 0.04 % gel Apply a pea-size amount to face every other day 45 g 2   • [DISCONTINUED] amLODIPine (NORVASC) 5 MG tablet Take 1 tablet by mouth Daily. 30 tablet 1   • [DISCONTINUED] Ferrous Gluconate (IRON 27 PO) Take 1 tablet by mouth Daily.     • [DISCONTINUED] lamoTRIgine (LaMICtal) 100 MG tablet take 1 tablet by mouth twice a day 60 tablet 3     No current facility-administered medications on file prior to visit.     Past Medical History:   Diagnosis Date   • Alcoholism in remission ([facility]) 02/05/2021   • Anxiety disorder 01/05/2021   • Asthma    • Constipation    • Encounter for blood transfusion    • H/O psychiatric care    • Left anterior cruciate ligament tear    • Major depression 02/05/2021   • Obesity    • Onychomycosis    • PONV (postoperative nausea and vomiting)       Physical Exam  Vitals reviewed.   Constitutional:       Appearance: Normal appearance. She is well-developed. She is morbidly obese.   Neck:      Thyroid: No thyroid mass, thyromegaly or thyroid tenderness.   Cardiovascular:      Rate and Rhythm: Normal rate and regular rhythm.      Heart sounds: No murmur  heard.    No friction rub. No gallop.   Pulmonary:      Effort: Pulmonary effort is normal.      Breath sounds: Normal breath sounds. No wheezing or rhonchi.   Lymphadenopathy:      Cervical: No cervical adenopathy.   Skin:     General: Skin is warm and dry.   Neurological:      Mental Status: She is alert and oriented to person, place, and time.      Cranial Nerves: No cranial nerve deficit.   Psychiatric:         Mood and Affect: Mood and affect normal.         Behavior: Behavior normal.         Thought Content: Thought content normal. Thought content does not include homicidal or suicidal ideation.         Judgment: Judgment normal.        Result Review :     Common labs    Common Labs 3/7/22 3/7/22 5/24/22 5/24/22 5/24/22 5/24/22 5/24/22 11/2/22 11/2/22 11/2/22    1422 1422 0830 0830 0830 0830 1037 1214 1214 1214   Glucose  88    89    97   BUN  12    20    15   Creatinine  1.10 (A)    0.88    0.83   Sodium  137    142    139   Potassium  4.0    4.1    4.1   Chloride  102    107    105   Calcium  9.8    9.2    9.6   Albumin  4.40    4.30    4.20   Total Bilirubin  0.3    0.2    0.3   Alkaline Phosphatase  91    90    81   AST (SGOT)  17    13    49 (A)   ALT (SGPT)  17    19    92 (A)   WBC 5.64  7.99     6.77     Hemoglobin 14.1  14.0     14.1     Hematocrit 44.3  43.9     43.7     Platelets 280  302     293     Total Cholesterol     157        Triglycerides     81        HDL Cholesterol     46        LDL Cholesterol      96        Hemoglobin A1C    5.30     5.70 (A)    Microalbumin, Urine       <1.2      (A) Abnormal value            CMP    CMP 3/7/22 5/24/22 11/2/22   Glucose 88 89 97   BUN 12 20 15   Creatinine 1.10 (A) 0.88 0.83   Sodium 137 142 139   Potassium 4.0 4.1 4.1   Chloride 102 107 105   Calcium 9.8 9.2 9.6   Albumin 4.40 4.30 4.20   Total Bilirubin 0.3 0.2 0.3   Alkaline Phosphatase 91 90 81   AST (SGOT) 17 13 49 (A)   ALT (SGPT) 17 19 92 (A)   (A) Abnormal value            A1C Last 3 Results     HGBA1C Last 3 Results 5/24/22 11/2/22   Hemoglobin A1C 5.30 5.70 (A)   (A) Abnormal value            Data reviewed: Previous EKGs and Holter monitor reviewed today.          Assessment and Plan    Diagnoses and all orders for this visit:    1. Primary hypertension (Primary)  Assessment & Plan:  Hypertension is Improving but still not at goal.  Weight loss.  Medication changes per orders.  I will increase amlodipine dose to 10 mg daily.  Handout provided on heart healthy diet, see AVS.  Blood pressure will be reassessed in 4 weeks.    Orders:  -     Ambulatory Referral to Cardiology  -     amLODIPine (NORVASC) 10 MG tablet; Take 1 tablet by mouth Daily.  Dispense: 30 tablet; Refill: 1    2. Tachycardia  Assessment & Plan:  I will refer her to cardiology for further evaluation.  I did advise her to stop all caffeine.  I am increasing her amlodipine dose which may help with her heart rate as well.    Orders:  -     Ambulatory Referral to Cardiology  -     amLODIPine (NORVASC) 10 MG tablet; Take 1 tablet by mouth Daily.  Dispense: 30 tablet; Refill: 1    3. Elevated LFTs  Assessment & Plan:  We discussed elevated liver function causes and concerns.  She has quit drinking.  I also advised her to avoid any acetaminophen.  We will plan to check a hepatic panel when she comes for her follow-up in 1 month.  Handout provided on alcoholic liver disease, see AVS.      4. Borderline diabetes  Assessment & Plan:  Decrease carbs and sugars in diet, weight loss to help prevent diabetes.  Handouts provided, see AVS.      5. Vitamin D deficiency  Assessment & Plan:  Vitamin D level improving, continue over-the-counter vitamin D.      6. History of alcohol abuse  Assessment & Plan:  She is currently quit drinking.  I advised her to continue her therapy and avoid any alcohol.      7. Class 3 severe obesity due to excess calories with serious comorbidity and body mass index (BMI) of 45.0 to 49.9 in adult (HCC)  Assessment &  Plan:  Patient's (Body mass index is 46.05 kg/m².) indicates that they are morbidly obese (BMI > 40 or > 35 with obesity - related health condition) with health conditions that include none . Weight is unchanged. BMI is is above average; BMI management plan is completed. We discussed low calorie, low carb based diet program, portion control and increasing exercise.       I spent 40 minutes caring for Zhane on this date of service. This time includes time spent by me in the following activities:preparing for the visit, reviewing tests, performing a medically appropriate examination and/or evaluation , counseling and educating the patient/family/caregiver, ordering medications, tests, or procedures and documenting information in the medical record  Follow Up   Return in about 4 weeks (around 12/2/2022) for 30 min apt for complex pt, Recheck HTN/tachycardia/LFTs.  Patient was given instructions and counseling regarding her condition or for health maintenance advice. Please see specific information pulled into the AVS if appropriate.       Sonya Squires, APRN  11/04/2022

## 2022-11-04 NOTE — ASSESSMENT & PLAN NOTE
I will refer her to cardiology for further evaluation.  I did advise her to stop all caffeine.  I am increasing her amlodipine dose which may help with her heart rate as well.

## 2022-11-04 NOTE — ASSESSMENT & PLAN NOTE
We discussed elevated liver function causes and concerns.  She has quit drinking.  I also advised her to avoid any acetaminophen.  We will plan to check a hepatic panel when she comes for her follow-up in 1 month.  Handout provided on alcoholic liver disease, see AVS.

## 2022-11-04 NOTE — ASSESSMENT & PLAN NOTE
Hypertension is Improving but still not at goal.  Weight loss.  Medication changes per orders.  I will increase amlodipine dose to 10 mg daily.  Handout provided on heart healthy diet, see AVS.  Blood pressure will be reassessed in 4 weeks.

## 2022-11-04 NOTE — ASSESSMENT & PLAN NOTE
Decrease carbs and sugars in diet, weight loss to help prevent diabetes.  Handouts provided, see AVS.

## 2022-11-04 NOTE — ASSESSMENT & PLAN NOTE
Patient's (Body mass index is 46.05 kg/m².) indicates that they are morbidly obese (BMI > 40 or > 35 with obesity - related health condition) with health conditions that include none . Weight is unchanged. BMI is is above average; BMI management plan is completed. We discussed low calorie, low carb based diet program, portion control and increasing exercise.

## 2022-11-15 ENCOUNTER — TELEPHONE (OUTPATIENT)
Dept: FAMILY MEDICINE CLINIC | Facility: CLINIC | Age: 27
End: 2022-11-15

## 2022-11-15 DIAGNOSIS — J45.21 MILD INTERMITTENT ASTHMA WITH EXACERBATION: Primary | ICD-10-CM

## 2022-11-15 RX ORDER — ALBUTEROL SULFATE 90 UG/1
2 AEROSOL, METERED RESPIRATORY (INHALATION) EVERY 4 HOURS PRN
Qty: 18 G | Refills: 5 | Status: SHIPPED | OUTPATIENT
Start: 2022-11-15 | End: 2023-01-24 | Stop reason: SINTOL

## 2022-11-15 NOTE — TELEPHONE ENCOUNTER
Prior authorization Levalbuterol denied. Patient said she has tried albuterol as a child remembers making her cough more. She is willing to try albuterol.

## 2022-12-13 ENCOUNTER — OFFICE VISIT (OUTPATIENT)
Dept: FAMILY MEDICINE CLINIC | Facility: CLINIC | Age: 27
End: 2022-12-13

## 2022-12-13 VITALS
WEIGHT: 277.8 LBS | HEART RATE: 88 BPM | TEMPERATURE: 97 F | DIASTOLIC BLOOD PRESSURE: 85 MMHG | BODY MASS INDEX: 44.65 KG/M2 | HEIGHT: 66 IN | SYSTOLIC BLOOD PRESSURE: 137 MMHG | OXYGEN SATURATION: 98 %

## 2022-12-13 DIAGNOSIS — R79.89 ELEVATED LFTS: ICD-10-CM

## 2022-12-13 DIAGNOSIS — E66.01 CLASS 3 SEVERE OBESITY DUE TO EXCESS CALORIES WITH SERIOUS COMORBIDITY AND BODY MASS INDEX (BMI) OF 40.0 TO 44.9 IN ADULT: ICD-10-CM

## 2022-12-13 DIAGNOSIS — R73.03 BORDERLINE DIABETES: ICD-10-CM

## 2022-12-13 DIAGNOSIS — R05.3 PERSISTENT COUGH: ICD-10-CM

## 2022-12-13 DIAGNOSIS — R00.0 TACHYCARDIA: ICD-10-CM

## 2022-12-13 DIAGNOSIS — I10 PRIMARY HYPERTENSION: Primary | ICD-10-CM

## 2022-12-13 PROBLEM — E66.813 CLASS 3 SEVERE OBESITY DUE TO EXCESS CALORIES WITH SERIOUS COMORBIDITY AND BODY MASS INDEX (BMI) OF 40.0 TO 44.9 IN ADULT: Status: ACTIVE | Noted: 2021-02-05

## 2022-12-13 LAB
ALBUMIN SERPL-MCNC: 4.7 G/DL (ref 3.5–5.2)
ALP SERPL-CCNC: 89 U/L (ref 39–117)
ALT SERPL W P-5'-P-CCNC: 16 U/L (ref 1–33)
AST SERPL-CCNC: 14 U/L (ref 1–32)
BILIRUB CONJ SERPL-MCNC: <0.2 MG/DL (ref 0–0.3)
BILIRUB INDIRECT SERPL-MCNC: NORMAL MG/DL
BILIRUB SERPL-MCNC: 0.3 MG/DL (ref 0–1.2)
PROT SERPL-MCNC: 7.6 G/DL (ref 6–8.5)

## 2022-12-13 PROCEDURE — 80076 HEPATIC FUNCTION PANEL: CPT | Performed by: NURSE PRACTITIONER

## 2022-12-13 PROCEDURE — 99214 OFFICE O/P EST MOD 30 MIN: CPT | Performed by: NURSE PRACTITIONER

## 2022-12-13 RX ORDER — METOPROLOL SUCCINATE 25 MG/1
25 TABLET, EXTENDED RELEASE ORAL DAILY
Qty: 30 TABLET | Refills: 2 | Status: SHIPPED | OUTPATIENT
Start: 2022-12-13 | End: 2023-03-02

## 2022-12-13 RX ORDER — AMLODIPINE BESYLATE 5 MG/1
5 TABLET ORAL DAILY
Qty: 30 TABLET | Refills: 1 | Status: SHIPPED | OUTPATIENT
Start: 2022-12-13 | End: 2023-02-20 | Stop reason: SDUPTHER

## 2022-12-13 RX ORDER — BENZONATATE 200 MG/1
200 CAPSULE ORAL 3 TIMES DAILY PRN
Qty: 21 CAPSULE | Refills: 0 | Status: SHIPPED | OUTPATIENT
Start: 2022-12-13 | End: 2022-12-20

## 2022-12-13 RX ORDER — PREDNISONE 20 MG/1
20 TABLET ORAL 2 TIMES DAILY
Qty: 10 TABLET | Refills: 0 | Status: SHIPPED | OUTPATIENT
Start: 2022-12-13 | End: 2022-12-18

## 2022-12-13 NOTE — ASSESSMENT & PLAN NOTE
Patient's (Body mass index is 44.84 kg/m².) indicates that they are morbidly obese (BMI > 40 or > 35 with obesity - related health condition) with health conditions that include hypertension and Prediabetes . Weight is improving with lifestyle modifications. BMI is is above average; BMI management plan is completed. We discussed low calorie, low carb based diet program and portion control.

## 2022-12-13 NOTE — ASSESSMENT & PLAN NOTE
Hypertension is Improving with treatment but having side effects to medication.  Amlodipine is causing swelling..  Medication changes per orders.  We will decrease amlodipine dose to 5 mg daily and add on metoprolol which will help with her palpitations.  Blood pressure will be reassessed in 6 weeks.

## 2022-12-13 NOTE — ASSESSMENT & PLAN NOTE
Tachycardia has improved but still having palpitations.  I will decrease amlodipine dose to 5 mg daily due to swelling.  I will start her on metoprolol 25 mg once daily.  She will be seeing cardiology next month.  She will follow-up with me in 6 weeks or sooner if any new or worsening of symptoms.

## 2022-12-13 NOTE — PROGRESS NOTES
"Chief Complaint  Hypertension, Liver Follow-up (LFT follow up ), and Irregular Heart Beat    Subjective          Zhane Traylor, 27 y.o. female presents to [facility]  History of Present Illness   She presents today for one month follow up. She has HTN that was not at goal and she was tachycardic so I had increased amlodipine dose to 10 mg on her last visit on 11/4/22.  She states she has been having some swelling in her lower extremities, especially at the end of the day.  Her blood pressure and heart rate is much better.  She does still complain of heart palpitations occasionally. I had also referred her to cardiology and she is scheduled for an appointment next month.     Elevated LFTs: She was a previous drinker but has quit.  She is due to have her liver functions rechecked today.    Obesity/prediabetes: She has lost 8 pounds since last month.  She states she has decreased sodas.    She states she was diagnosed with the flu 2 weeks ago.  She states she is got a chronic cough that she cannot get rid of.  She states she is taking an over-the-counter cough medicine at night to help her sleep.    Asthma is well controlled, on Singulair.  She only uses albuterol inhaler as needed.    Objective   Vital Signs:   /85 (BP Location: Left arm, Patient Position: Sitting, Cuff Size: Adult)   Pulse 88   Temp 97 °F (36.1 °C)   Ht 167.6 cm (66\")   Wt 126 kg (277 lb 12.8 oz)   SpO2 98%   BMI 44.84 kg/m²       Current Outpatient Medications:   •  albuterol sulfate  (90 Base) MCG/ACT inhaler, Inhale 2 puffs Every 4 (Four) Hours As Needed for Wheezing or Shortness of Air., Disp: 18 g, Rfl: 5  •  amLODIPine (NORVASC) 5 MG tablet, Take 1 tablet by mouth Daily., Disp: 30 tablet, Rfl: 1  •  cetirizine (zyrTEC) 10 MG tablet, Zyrtec 10 mg oral tablet take 1 tablet (10 mg) by oral route once daily   Active, Disp: , Rfl:   •  clonazePAM (KlonoPIN) 1 MG tablet, Take 1 tablet by mouth 3 " (Three) Times a Day., Disp: 90 tablet, Rfl: 0  •  Ergocalciferol (VITAMIN D2 PO), Take 1 capsule by mouth Daily., Disp: , Rfl:   •  Etonogestrel (Nexplanon) 68 MG implant subdermal implant, Inject 68 mg into the appropriate area of the skin as directed by provider., Disp: , Rfl:   •  Fluticasone-Salmeterol (ADVAIR/WIXELA) 250-50 MCG/ACT DISKUS, Inhale 1 puff 2 (Two) Times a Day. For asthma. Rinse mouth after use, Disp: 60 each, Rfl: 3  •  lamoTRIgine (LaMICtal) 100 MG tablet, take 1 tablet by mouth twice daily, Disp: 60 tablet, Rfl: 3  •  Latuda 40 MG tablet tablet, Take 1 tablet by mouth every night at bedtime., Disp: 30 tablet, Rfl: 2  •  levalbuterol (XOPENEX HFA) 45 MCG/ACT inhaler, Inhale 1-2 puffs Every 4 (Four) Hours As Needed for Wheezing or Shortness of Air., Disp: 15 g, Rfl: 5  •  LORazepam (ATIVAN) 1 MG tablet, Take 1 tablet by mouth 2 (Two) Times a Day., Disp: 60 tablet, Rfl: 0  •  lurasidone (Latuda) 40 MG tablet tablet, take 1 tablet by mouth every night, Disp: 30 tablet, Rfl: 3  •  montelukast (Singulair) 10 MG tablet, Take 1 tablet by mouth Every Night., Disp: 30 tablet, Rfl: 5  •  ramelteon (Rozerem) 8 MG tablet, take 1 tablet by mouth at bedtime, Disp: 30 tablet, Rfl: 3  •  Retin-A 0.025 % cream, apply daily after washing, Disp: 45 g, Rfl: 2  •  spironolactone (ALDACTONE) 25 MG tablet, Take 1 tablet by mouth 3 (Three) Times a Day., Disp: 90 tablet, Rfl: 5  •  tretinoin microspheres (Retin-A Micro) 0.04 % gel, Apply a pea-size amount to face every other day, Disp: 45 g, Rfl: 2  •  benzonatate (TESSALON) 200 MG capsule, Take 1 capsule by mouth 3 (Three) Times a Day As Needed for Cough for up to 7 days., Disp: 21 capsule, Rfl: 0  •  metoprolol succinate XL (Toprol XL) 25 MG 24 hr tablet, Take 1 tablet by mouth Daily., Disp: 30 tablet, Rfl: 2  •  predniSONE (DELTASONE) 20 MG tablet, Take 1 tablet by mouth 2 (Two) Times a Day for 5 days., Disp: 10 tablet, Rfl: 0   Past Medical History:   Diagnosis Date    • Alcoholism in remission (HCC) 02/05/2021   • Anxiety disorder 01/05/2021   • Asthma    • Constipation    • Encounter for blood transfusion    • H/O psychiatric care    • Left anterior cruciate ligament tear    • Major depression 02/05/2021   • Obesity    • Onychomycosis    • PONV (postoperative nausea and vomiting)       Physical Exam  Vitals reviewed.   Constitutional:       Appearance: Normal appearance. She is well-developed. She is morbidly obese.   Neck:      Thyroid: No thyroid mass, thyromegaly or thyroid tenderness.   Cardiovascular:      Rate and Rhythm: Normal rate and regular rhythm.      Heart sounds: No murmur heard.    No friction rub. No gallop.   Pulmonary:      Effort: Pulmonary effort is normal.      Breath sounds: Normal breath sounds. No wheezing or rhonchi.   Lymphadenopathy:      Cervical: No cervical adenopathy.   Skin:     General: Skin is warm and dry.   Neurological:      Mental Status: She is alert and oriented to person, place, and time.      Cranial Nerves: No cranial nerve deficit.   Psychiatric:         Mood and Affect: Mood and affect normal.         Behavior: Behavior normal.         Thought Content: Thought content normal. Thought content does not include homicidal or suicidal ideation.         Judgment: Judgment normal.        Result Review :     CMP    CMP 3/7/22 5/24/22 11/2/22   Glucose 88 89 97   BUN 12 20 15   Creatinine 1.10 (A) 0.88 0.83   Sodium 137 142 139   Potassium 4.0 4.1 4.1   Chloride 102 107 105   Calcium 9.8 9.2 9.6   Albumin 4.40 4.30 4.20   Total Bilirubin 0.3 0.2 0.3   Alkaline Phosphatase 91 90 81   AST (SGOT) 17 13 49 (A)   ALT (SGPT) 17 19 92 (A)   (A) Abnormal value            CBC    CBC 3/7/22 5/24/22 11/2/22   WBC 5.64 7.99 6.77   RBC 5.07 5.05 4.99   Hemoglobin 14.1 14.0 14.1   Hematocrit 44.3 43.9 43.7   MCV 87.4 86.9 87.6   MCH 27.8 27.7 28.3   MCHC 31.8 31.9 32.3   RDW 14.0 13.2 13.1   Platelets 280 302 293           A1C Last 3 Results    HGBA1C  Last 3 Results 5/24/22 11/2/22   Hemoglobin A1C 5.30 5.70 (A)   (A) Abnormal value                      Assessment and Plan    Diagnoses and all orders for this visit:    1. Primary hypertension (Primary)  Assessment & Plan:  Hypertension is Improving with treatment but having side effects to medication.  Amlodipine is causing swelling..  Medication changes per orders.  We will decrease amlodipine dose to 5 mg daily and add on metoprolol which will help with her palpitations.  Blood pressure will be reassessed in 6 weeks.    Orders:  -     amLODIPine (NORVASC) 5 MG tablet; Take 1 tablet by mouth Daily.  Dispense: 30 tablet; Refill: 1  -     metoprolol succinate XL (Toprol XL) 25 MG 24 hr tablet; Take 1 tablet by mouth Daily.  Dispense: 30 tablet; Refill: 2    2. Tachycardia  Assessment & Plan:  Tachycardia has improved but still having palpitations.  I will decrease amlodipine dose to 5 mg daily due to swelling.  I will start her on metoprolol 25 mg once daily.  She will be seeing cardiology next month.  She will follow-up with me in 6 weeks or sooner if any new or worsening of symptoms.    Orders:  -     amLODIPine (NORVASC) 5 MG tablet; Take 1 tablet by mouth Daily.  Dispense: 30 tablet; Refill: 1  -     metoprolol succinate XL (Toprol XL) 25 MG 24 hr tablet; Take 1 tablet by mouth Daily.  Dispense: 30 tablet; Refill: 2    3. Persistent cough  Comments:  Lingering cough s/p flu, will treat with steroid and cough pills, advised to call if no improvement or any worsening of symptoms.  Orders:  -     benzonatate (TESSALON) 200 MG capsule; Take 1 capsule by mouth 3 (Three) Times a Day As Needed for Cough for up to 7 days.  Dispense: 21 capsule; Refill: 0  -     predniSONE (DELTASONE) 20 MG tablet; Take 1 tablet by mouth 2 (Two) Times a Day for 5 days.  Dispense: 10 tablet; Refill: 0    4. Elevated LFTs  Assessment & Plan:  We will recheck liver function levels today, will notify of results.    Orders:  -     Hepatic  Function Panel    5. Borderline diabetes  Assessment & Plan:  She is decreasing sodas and working on eating healthier.  We discussed weight loss to help prevent diabetes.  We will plan to recheck A1c in about 3 months.      6. Class 3 severe obesity due to excess calories with serious comorbidity and body mass index (BMI) of 40.0 to 44.9 in adult (HCC)  Assessment & Plan:  Patient's (Body mass index is 44.84 kg/m².) indicates that they are morbidly obese (BMI > 40 or > 35 with obesity - related health condition) with health conditions that include hypertension and Prediabetes . Weight is improving with lifestyle modifications. BMI is is above average; BMI management plan is completed. We discussed low calorie, low carb based diet program and portion control.         Follow Up   Return in about 6 weeks (around 1/24/2023) for Next scheduled follow up HTN, Tachycardia.  Patient was given instructions and counseling regarding her condition or for health maintenance advice. Please see specific information pulled into the AVS if appropriate.       Sonya Squires, APRN  11/14/2022

## 2022-12-13 NOTE — ASSESSMENT & PLAN NOTE
She is decreasing sodas and working on eating healthier.  We discussed weight loss to help prevent diabetes.  We will plan to recheck A1c in about 3 months.

## 2022-12-15 RX ORDER — LAMOTRIGINE 100 MG/1
TABLET ORAL
Qty: 60 TABLET | Refills: 3 | OUTPATIENT
Start: 2022-12-15

## 2023-01-06 PROCEDURE — 87086 URINE CULTURE/COLONY COUNT: CPT | Performed by: FAMILY MEDICINE

## 2023-01-10 ENCOUNTER — OFFICE VISIT (OUTPATIENT)
Dept: CARDIOLOGY | Facility: CLINIC | Age: 28
End: 2023-01-10
Payer: COMMERCIAL

## 2023-01-10 VITALS
HEART RATE: 111 BPM | WEIGHT: 274.8 LBS | SYSTOLIC BLOOD PRESSURE: 139 MMHG | DIASTOLIC BLOOD PRESSURE: 94 MMHG | HEIGHT: 66 IN | BODY MASS INDEX: 44.16 KG/M2

## 2023-01-10 DIAGNOSIS — R42 EPISODIC LIGHTHEADEDNESS: ICD-10-CM

## 2023-01-10 DIAGNOSIS — E66.01 MORBID OBESITY WITH BMI OF 40.0-44.9, ADULT: ICD-10-CM

## 2023-01-10 DIAGNOSIS — R00.2 INTERMITTENT PALPITATIONS: Primary | ICD-10-CM

## 2023-01-10 DIAGNOSIS — G90.A POSTURAL ORTHOSTATIC TACHYCARDIA SYNDROME (POTS): ICD-10-CM

## 2023-01-10 DIAGNOSIS — I10 ESSENTIAL HYPERTENSION: ICD-10-CM

## 2023-01-10 PROCEDURE — 99203 OFFICE O/P NEW LOW 30 MIN: CPT | Performed by: INTERNAL MEDICINE

## 2023-01-10 PROCEDURE — 93000 ELECTROCARDIOGRAM COMPLETE: CPT | Performed by: INTERNAL MEDICINE

## 2023-01-10 RX ORDER — LOSARTAN POTASSIUM 50 MG/1
50 TABLET ORAL DAILY
Qty: 90 TABLET | Refills: 3 | Status: SHIPPED | OUTPATIENT
Start: 2023-01-10

## 2023-01-10 NOTE — PROGRESS NOTES
Chief Complaint  Rapid Heart Rate (/), Hypertension, Dizziness (/), and Shortness of Breath    Subjective            Zhane Traylor presents to [facility]  History of Present Illness  Ms. Traylor is a new patient who presents for initial evaluation today:  -She was referred here by CLEO Giang due to recurrent episodes of palpitations/tachycardia, as well as hypertension and mild chronic exertional dyspnea  -The patient cannot identify any precipitating activities for her episodes of palpitations and states these episodes are occurring multiple times per day   -She is significantly overweight with a BMI of 44, which appears to be at her chronic baseline   -Ms. Traylor does report some episodes of transient dizziness/lightheadedness.  Her orthostatics obtained in the office today showed no evidence of orthostatic hypotension (supine BP was 148/95 with a heart rate of 97 bpm, sitting BP was 151/101 with a heart rate of 108 bpm, standing BP was 171/107 with a heart rate of 114 bpm),although she did have a significant increase in her heart rate upon standing, suggestive of possible POTS.  -She states she was previously on amlodipine 10 mg daily for hypertension, but developed significant lower extremity swelling on that dose, so her dose was decreased to 5 mg daily.  She also remains on antihypertensive therapy with Aldactone, and her PCP recently started her on Toprol XL for her recurrent palpitations.    -Her ECG today showed sinus rhythm with low voltage in the precordial leads, but was otherwise unremarkable   -She reports a chronic non-productive cough, but no orthopnea, PND, syncope, chest pain/pressure, visual changes, or fever/chills   -Her previous Holter monitor in November 2021 showed sinus rhythm with no evidence of arrhythmias or other abnormalities     Past Medical History:   Diagnosis Date   • Alcoholism in remission (HCC) 02/05/2021   • Anxiety disorder 01/05/2021   •  Asthma    • Constipation    • Encounter for blood transfusion    • H/O psychiatric care    • Hypertension    • Left anterior cruciate ligament tear    • Major depression 2021   • Obesity    • Onychomycosis    • PONV (postoperative nausea and vomiting)        Past Surgical History:   • DILATATION AND CURETTAGE   • KNEE ACL RECONSTRUCTION    Procedure: LEFT KNEE ATHROSCOPIC ANTERIOR CRUCIATE LIGAMENT RECONSTRUCTION  WITH  ALLOGRAFT;  Surgeon: Troy Hsu MD;  Location: [facility] OR [facility];  Service: Orthopedics;  Laterality: Left;   • WISDOM TOOTH EXTRACTION       Social History     Tobacco Use   • Smoking status: Former     Packs/day: 0.50     Years: 6.00     Pack years: 3.00     Types: Cigarettes     Start date: 10/19/2010     Quit date: 10/19/2016     Years since quittin.2     Passive exposure: Never   • Smokeless tobacco: Never   Vaping Use   • Vaping Use: Never used   Substance Use Topics   • Alcohol use: Not Currently     Comment: LAST DRINK 2021 alcoholism in remission   • Drug use: Never       Family History   Problem Relation Age of Onset   • Atrial fibrillation Father    • Heart failure Maternal Grandmother    • Heart failure Maternal Grandfather    • Heart failure Paternal Grandmother    • Heart failure Paternal Grandfather    • Heart disease Other    • Diabetes Other    • Breast cancer Other         Current Outpatient Medications on File Prior to Visit   Medication Sig   • albuterol sulfate  (90 Base) MCG/ACT inhaler Inhale 2 puffs Every 4 (Four) Hours As Needed for Wheezing or Shortness of Air.   • amLODIPine (NORVASC) 5 MG tablet Take 1 tablet by mouth Daily.   • cetirizine (zyrTEC) 10 MG tablet Zyrtec 10 mg oral tablet take 1 tablet (10 mg) by oral route once daily   Active   • Ergocalciferol (VITAMIN D2 PO) Take 1 capsule by mouth Daily.   • Etonogestrel (Nexplanon) 68 MG implant subdermal implant Inject 68 mg into the appropriate area of the skin as directed by provider.   •  Fluticasone-Salmeterol (ADVAIR/WIXELA) 250-50 MCG/ACT DISKUS Inhale 1 puff 2 (Two) Times a Day. For asthma. Rinse mouth after use   • lamoTRIgine (LaMICtal) 100 MG tablet Take 1 tablet by mouth twice daily   • Latuda 40 MG tablet tablet Take 1 tablet by mouth every night at bedtime.   • levalbuterol (XOPENEX HFA) 45 MCG/ACT inhaler Inhale 1-2 puffs Every 4 (Four) Hours As Needed for Wheezing or Shortness of Air.   • LORazepam (ATIVAN) 1 MG tablet Take 1 tablet by mouth 3 times a day as needed for anxiety   • meloxicam (MOBIC) 15 MG tablet 1 tablet by mouth daily   • metoprolol succinate XL (Toprol XL) 25 MG 24 hr tablet Take 1 tablet by mouth Daily.   • montelukast (Singulair) 10 MG tablet Take 1 tablet by mouth Every Night.   • nitrofurantoin, macrocrystal-monohydrate, (MACROBID) 100 MG capsule Take 1 capsule by mouth 2 (Two) Times a Day.   • ramelteon (Rozerem) 8 MG tablet take 1 tablet by mouth at bedtime   • Retin-A 0.025 % cream apply daily after washing   • spironolactone (ALDACTONE) 25 MG tablet Take 1 tablet by mouth 3 (Three) Times a Day.   • tretinoin microspheres (Retin-A Micro) 0.04 % gel Apply a pea-size amount to face every other day   • [DISCONTINUED] clonazePAM (KlonoPIN) 1 MG tablet Take 1 tablet by mouth 3 (Three) Times a Day.   • [DISCONTINUED] lurasidone (Latuda) 40 MG tablet tablet Take 1 tablet by  mouth at bedtime   • [DISCONTINUED] lurasidone (Latuda) 40 MG tablet tablet take 1 tablet by mouth every night   • [DISCONTINUED] ramelteon (Rozerem) 8 MG tablet Take 1 tablet by mouth at bedtime     No current facility-administered medications on file prior to visit.       No Known Allergies    Review of Systems   Constitutional: Positive for fatigue. Negative for chills, fever and unexpected weight gain.   HENT: Negative for hearing loss, nosebleeds and sore throat.    Eyes: Negative for pain and visual disturbance.   Respiratory: Positive for shortness of breath. Negative for cough and wheezing.   "  Cardiovascular: Positive for palpitations. Negative for chest pain and leg swelling.   Gastrointestinal: Negative for abdominal pain, blood in stool and vomiting.   Endocrine: Negative for cold intolerance and heat intolerance.   Genitourinary: Negative for dysuria and hematuria.   Musculoskeletal: Positive for arthralgias. Negative for joint swelling and myalgias.   Skin: Negative for color change, pallor and rash.   Neurological: Positive for light-headedness. Negative for tremors, syncope, weakness and headache.   Hematological: Negative for adenopathy. Does not bruise/bleed easily.        Objective     /94   Pulse 111   Ht 167.6 cm (66\")   Wt 125 kg (274 lb 12.8 oz)   BMI 44.35 kg/m²       Physical Exam  Constitutional:       General: She is not in acute distress.     Appearance: Normal appearance.   HENT:      Head: Atraumatic.      Mouth/Throat:      Mouth: Mucous membranes are moist.      Pharynx: Oropharynx is clear. No oropharyngeal exudate.   Eyes:      General: No scleral icterus.     Conjunctiva/sclera: Conjunctivae normal.   Neck:      Vascular: No carotid bruit or JVD.   Cardiovascular:      Rate and Rhythm: Regular rhythm. Tachycardia present.      Chest Wall: PMI is not displaced.      Pulses:           Radial pulses are 2+ on the right side and 2+ on the left side.      Heart sounds: S1 normal and S2 normal. Heart sounds are distant. No murmur heard.    No friction rub. No gallop. No S3 sounds.   Pulmonary:      Effort: Pulmonary effort is normal. Prolonged expiration present. No tachypnea or respiratory distress.      Breath sounds: No decreased breath sounds, wheezing, rhonchi or rales.   Chest:      Chest wall: No tenderness.   Abdominal:      General: Bowel sounds are normal. There is no distension.      Palpations: Abdomen is soft. There is no mass.      Tenderness: There is no abdominal tenderness.      Comments: Morbidly obese.   Musculoskeletal:         General: No swelling, " tenderness or deformity.      Cervical back: Neck supple. No tenderness.      Right lower leg: No edema.      Left lower leg: No edema.   Skin:     General: Skin is warm and dry.      Coloration: Skin is not jaundiced.      Findings: No erythema or rash.      Nails: There is no clubbing.   Neurological:      General: No focal deficit present.      Mental Status: She is alert and oriented to person, place, and time.      Motor: No weakness.   Psychiatric:         Mood and Affect: Mood normal.         Behavior: Behavior normal.       Result Review :     The following data was reviewed by: Sourav Childress MD on 01/10/2023:    CMP    CMP 5/24/22 11/2/22 12/13/22   Glucose 89 97    BUN 20 15    Creatinine 0.88 0.83    eGFR 93.7 99.9    Sodium 142 139    Potassium 4.1 4.1    Chloride 107 105    Calcium 9.2 9.6    Total Protein 6.9 7.1 7.6   Albumin 4.30 4.20 4.70   Globulin 2.6 2.9    Total Bilirubin 0.2 0.3 0.3   Alkaline Phosphatase 90 81 89   AST (SGOT) 13 49 (A) 14   ALT (SGPT) 19 92 (A) 16   Albumin/Globulin Ratio 1.7 1.4    BUN/Creatinine Ratio 22.7 18.1    Anion Gap 12.7 9.0    (A) Abnormal value       Comments are available for some flowsheets but are not being displayed.           CBC    CBC 3/7/22 5/24/22 11/2/22   WBC 5.64 7.99 6.77   RBC 5.07 5.05 4.99   Hemoglobin 14.1 14.0 14.1   Hematocrit 44.3 43.9 43.7   MCV 87.4 86.9 87.6   MCH 27.8 27.7 28.3   MCHC 31.8 31.9 32.3   RDW 14.0 13.2 13.1   Platelets 280 302 293           Lipid Panel    Lipid Panel 5/24/22   Total Cholesterol 157   Triglycerides 81   HDL Cholesterol 46   VLDL Cholesterol 15   LDL Cholesterol  96   LDL/HDL Ratio 2.06             ECG 12 Lead    Date/Time: 1/10/2023 4:02 PM  Performed by: Sourav Childress MD  Authorized by: Sourav Childress MD   Comparison: compared with previous ECG from 3/7/2022  Similar to previous ECG  Rhythm: sinus rhythm  Rate: normal  BPM: 98  Conduction: conduction normal  ST Segments: ST segments normal  T  Waves: T waves normal  QRS axis: normal  Other findings: low voltage  Comments: Sinus rhythm with low voltage in the precordial leads, but otherwise an unremarkable ECG.  No significant change was observed from the patient's prior ECG in March 2022.        Assessment and Plan      Diagnoses and all orders for this visit:    1. Intermittent palpitations (Primary)  -     Adult Transthoracic Echo Complete w/ Color, Spectral and Contrast if necessary per protocol; Future  -     Holter Monitor - 24 Hour; Future  -     ECG 12 Lead    2. Essential hypertension  -     Adult Transthoracic Echo Complete w/ Color, Spectral and Contrast if necessary per protocol; Future  -     losartan (Cozaar) 50 MG tablet; Take 1 tablet by mouth Daily.  Dispense: 90 tablet; Refill: 3  -     Basic Metabolic Panel; Future    3. Postural orthostatic tachycardia syndrome (POTS)  -     Tilt Table; Future    4. Morbid obesity with BMI of 40.0-44.9, adult (HCC)    5. Episodic lightheadedness  -     Tilt Table; Future  -     ECG 12 Lead    -Will obtain a 24 hour Holter monitor for evaluation of her recurrent palpitations/tachycardia  -Check an echocardiogram to assess for LVH/hypertensive heart disease and to rule out significant structural pathology  -Given today's heart rate findings on her orthostatics and her reported episodes of dizziness/lightheadedness (particularly after standing or while walking), will obtain a tilt table study to assess for POTS   -Start additional antihypertensive therapy with losartan 50 mg daily.  She was instructed to maintain a BP log at home at least 3-4 times per week.   -Check labs as outlined above   -Timing of follow up will be based on the results of the aforementioned studies         Follow Up     No follow-ups on file.    Patient was given instructions and counseling regarding her condition or for health maintenance advice. Please see specific information pulled into the AVS if appropriate.     Zhane Traylor   reports that she quit smoking about 6 years ago. Her smoking use included cigarettes. She started smoking about 12 years ago. She has a 3.00 pack-year smoking history. She has never been exposed to tobacco smoke. She has never used smokeless tobacco.  I have educated her on the risk of diseases from using tobacco products such as cancer, COPD and heart disease.  Continued tobacco cessation was strongly advised.      Sourav Childress MD, FACC, [facility]  Interventional Cardiology

## 2023-01-11 RX ORDER — LORAZEPAM 1 MG/1
TABLET ORAL
Qty: 90 TABLET | Refills: 0 | Status: CANCELLED | OUTPATIENT
Start: 2023-01-11

## 2023-01-12 ENCOUNTER — OFFICE VISIT (OUTPATIENT)
Dept: PULMONOLOGY | Facility: CLINIC | Age: 28
End: 2023-01-12
Payer: COMMERCIAL

## 2023-01-12 VITALS
WEIGHT: 269.4 LBS | BODY MASS INDEX: 43.3 KG/M2 | TEMPERATURE: 97.5 F | RESPIRATION RATE: 16 BRPM | DIASTOLIC BLOOD PRESSURE: 79 MMHG | HEART RATE: 91 BPM | SYSTOLIC BLOOD PRESSURE: 130 MMHG | OXYGEN SATURATION: 100 % | HEIGHT: 66 IN

## 2023-01-12 DIAGNOSIS — J45.20 MILD INTERMITTENT ASTHMA WITHOUT COMPLICATION: ICD-10-CM

## 2023-01-12 DIAGNOSIS — E66.01 CLASS 3 SEVERE OBESITY DUE TO EXCESS CALORIES WITH SERIOUS COMORBIDITY AND BODY MASS INDEX (BMI) OF 40.0 TO 44.9 IN ADULT: Primary | ICD-10-CM

## 2023-01-12 PROCEDURE — 99203 OFFICE O/P NEW LOW 30 MIN: CPT | Performed by: INTERNAL MEDICINE

## 2023-01-16 ENCOUNTER — PATIENT ROUNDING (BHMG ONLY) (OUTPATIENT)
Dept: PULMONOLOGY | Facility: CLINIC | Age: 28
End: 2023-01-16
Payer: COMMERCIAL

## 2023-01-16 NOTE — PROGRESS NOTES
January 16, 2023    Hello, may I speak with Zhane Traylor?    My name is Christie      I am  with [facility]  [address]  [address]  [place] [address] [phone].    Before we get started may I verify your date of birth? 1995    I am calling to officially welcome you to our practice and ask about your recent visit. Is this a good time to talk? My chart message sent for patient rounding.

## 2023-01-23 NOTE — PROGRESS NOTES
Chief Complaint  Bronchitis (Chronic )    Subjective          Zhane Traylor, 27 y.o. female presents to [facility]  History of Present Illness   Patient presents today for a 6-week follow-up on hypertension, tachycardia, and leg swelling.  She had been having swelling of the lower extremities after increasing amlodipine dose to 10 mg.  We decreased amlodipine dose back down to 5 mg daily and added on metoprolol XL 25 mg to help with her palpitations.  She did see cardiology on 1/10/2023.  Dr. Childress has ordered a 24-hour Holter monitor, echocardiogram and a tilt table test.  He also started her on losartan 50 mg.     She verbalizes that she feels well but continues to have palpitations. She has not noticed a change or improvement to the palpitations. All of her Cardiology testing is scheduled on 02/27/23 and 03/21/2023.  She has also been referred for a sleep study and is scheduled for home sleep study. She has not yet had her BMP drawn per cardiology but plans to do that soon also.     Liver function improved and is now normal when rechecked last month.    Borderline diabetes/obesity: She is working on decreasing carbs and sugars in her diet.  Her last A1c was 5.7% on 11/2/2022.    Patient is also presenting today with cough and congestion that started about a week ago. She states she has a productive cough with yellow and thick sputum. She denies any fevers. She states she usually gets this congestion / cough a couple of times a year during the winter. She also states that she had a Pulmonology follow-up not long ago as there was some possible concern for COPD, however they only confirmed Asthma.  She has been using her albuterol inhaler some for her Asthma but complains that albuterol does increase her heart palpitations.  She also does have a Xopenex inhaler.  She states she had done well with prednisone in the past but does complain that it keeps her awake even if she takes it  "early in the day.    She did voice concerns this visit that she has only started having all of these issues since she had COVID a year and half ago.     PHQ-2 Depression Screening  Little interest or pleasure in doing things? 0-->not at all   Feeling down, depressed, or hopeless? 1-->several days   PHQ-2 Total Score 1        Tobacco Use: Medium Risk   • Smoking Tobacco Use: Former   • Smokeless Tobacco Use: Never   • Passive Exposure: Never      Objective   Vital Signs:   /78 (BP Location: Right arm, Patient Position: Sitting, Cuff Size: Adult)   Pulse 93   Temp 96.4 °F (35.8 °C)   Ht 167.6 cm (66\")   Wt 122 kg (269 lb)   SpO2 98%   BMI 43.42 kg/m²       Current Outpatient Medications:   •  amLODIPine (NORVASC) 5 MG tablet, Take 1 tablet by mouth Daily., Disp: 30 tablet, Rfl: 1  •  cetirizine (zyrTEC) 10 MG tablet, Zyrtec 10 mg oral tablet take 1 tablet (10 mg) by oral route once daily   Active, Disp: , Rfl:   •  Ergocalciferol (VITAMIN D2 PO), Take 1 capsule by mouth Daily., Disp: , Rfl:   •  Etonogestrel (Nexplanon) 68 MG implant subdermal implant, Inject 68 mg into the appropriate area of the skin as directed by provider., Disp: , Rfl:   •  Fluticasone-Salmeterol (ADVAIR/WIXELA) 250-50 MCG/ACT DISKUS, Inhale 1 puff 2 (Two) Times a Day. For asthma. Rinse mouth after use, Disp: 60 each, Rfl: 3  •  lamoTRIgine (LaMICtal) 100 MG tablet, Take 1 tablet by mouth twice daily, Disp: 60 tablet, Rfl: 3  •  Latuda 40 MG tablet tablet, Take 1 tablet by mouth every night at bedtime., Disp: 30 tablet, Rfl: 2  •  levalbuterol (XOPENEX HFA) 45 MCG/ACT inhaler, Inhale 1-2 puffs Every 4 (Four) Hours As Needed for Wheezing or Shortness of Air., Disp: 15 g, Rfl: 5  •  LORazepam (ATIVAN) 1 MG tablet, Take 1 tablet by mouth 3 times a day as needed for anxiety, Disp: 90 tablet, Rfl: 0  •  losartan (Cozaar) 50 MG tablet, Take 1 tablet by mouth Daily., Disp: 90 tablet, Rfl: 3  •  lurasidone (Latuda) 40 MG tablet tablet, Take 1 " tablet by mouth every night at bedtime., Disp: 30 tablet, Rfl: 3  •  meloxicam (MOBIC) 15 MG tablet, 1 tablet by mouth daily, Disp: 90 tablet, Rfl: 0  •  metoprolol succinate XL (Toprol XL) 25 MG 24 hr tablet, Take 1 tablet by mouth Daily., Disp: 30 tablet, Rfl: 2  •  montelukast (Singulair) 10 MG tablet, Take 1 tablet by mouth Every Night., Disp: 30 tablet, Rfl: 5  •  nitrofurantoin, macrocrystal-monohydrate, (MACROBID) 100 MG capsule, Take 1 capsule by mouth 2 (Two) Times a Day., Disp: 20 capsule, Rfl: 0  •  ramelteon (Rozerem) 8 MG tablet, take 1 tablet by mouth at bedtime, Disp: 30 tablet, Rfl: 3  •  Retin-A 0.025 % cream, apply daily after washing, Disp: 45 g, Rfl: 2  •  spironolactone (ALDACTONE) 25 MG tablet, Take 1 tablet by mouth 3 (Three) Times a Day., Disp: 90 tablet, Rfl: 5  •  tretinoin microspheres (Retin-A Micro) 0.04 % gel, Apply a pea-size amount to face every other day, Disp: 45 g, Rfl: 2  •  predniSONE (DELTASONE) 20 MG tablet, Take 3 tablets by mouth Daily for 1 day, THEN 2 tablets Daily for 2 days, THEN 1 tablet Daily for 2 days., Disp: 9 tablet, Rfl: 0  •  traZODone (DESYREL) 50 MG tablet, Take 0.5-1 tablets by mouth At Night As Needed for Sleep., Disp: 30 tablet, Rfl: 0   Past Medical History:   Diagnosis Date   • Alcoholism in remission (HCC) 02/05/2021   • Anxiety disorder 01/05/2021   • Asthma    • Constipation    • Encounter for blood transfusion    • H/O psychiatric care    • Hypertension    • Left anterior cruciate ligament tear    • Major depression 02/05/2021   • Obesity    • Onychomycosis    • PONV (postoperative nausea and vomiting)       Physical Exam  Vitals reviewed.   Constitutional:       Appearance: Normal appearance. She is well-developed.   Neck:      Thyroid: No thyroid mass, thyromegaly or thyroid tenderness.   Cardiovascular:      Rate and Rhythm: Normal rate and regular rhythm.      Heart sounds: No murmur heard.    No friction rub. No gallop.   Pulmonary:      Effort:  Pulmonary effort is normal.      Breath sounds: Normal breath sounds. No wheezing or rhonchi.      Comments: Congested cough noted.  Musculoskeletal:      Right lower leg: No edema.      Left lower leg: No edema.   Lymphadenopathy:      Cervical: No cervical adenopathy.   Skin:     General: Skin is warm and dry.   Neurological:      Mental Status: She is alert and oriented to person, place, and time.      Cranial Nerves: No cranial nerve deficit.   Psychiatric:         Mood and Affect: Mood and affect normal.         Behavior: Behavior normal.         Thought Content: Thought content normal. Thought content does not include homicidal or suicidal ideation.         Judgment: Judgment normal.        Result Review :                 Assessment and Plan    Diagnoses and all orders for this visit:    1. Moderate persistent asthma with acute exacerbation (Primary)  Assessment & Plan:  Asthma is Worsening with an acute exacerbation.  The patient is experiencing frequent daytime asthma symptoms. She is experiencing frequent nighttime asthma symptoms.  Asthma information handout given.  Discussed monitoring symptoms and use of quick-relief medications and contacting us early in the course of exacerbations.  Warning signs of respiratory distress were reviewed with the patient.   Medications: discontinue Albuterol inhaler due to side effect, begin Prednisone Dosepak for 5 days and resume Xopenex inhaler, advised to use every 4-6 hours for the next few days and then as needed.  Follow up in 3 months, or sooner should new symptoms or problems arise.  See AVS handout for asthma.           Orders:  -     predniSONE (DELTASONE) 20 MG tablet; Take 3 tablets by mouth Daily for 1 day, THEN 2 tablets Daily for 2 days, THEN 1 tablet Daily for 2 days.  Dispense: 9 tablet; Refill: 0    2. Insomnia, unspecified type  Assessment & Plan:  I will start her on trazodone 50 mg at night as needed, advised her to start half a tablet first and may  increase to full tablet if half a tablet is not sufficient to help her sleep.    Orders:  -     traZODone (DESYREL) 50 MG tablet; Take 0.5-1 tablets by mouth At Night As Needed for Sleep.  Dispense: 30 tablet; Refill: 0    3. Acute cough  Comments:  May take OTC Delsym already in home for symptom relief. May also take Tessalon pearles previously prescribed and still in home for symptom relief.       Follow Up   Return in about 3 months (around 4/24/2023) for Next scheduled follow up prediab, HTN, asthma.  Patient was given instructions and counseling regarding her condition or for health maintenance advice. Please see specific information pulled into the AVS if appropriate.     Patient was also seen by Maida Mcdowell, NP student.    Parts of this note are electronic transcriptions/translations of spoken language to printed text using the Dragon Dictation system.      Sonya Squires, APRN  01/24/2023

## 2023-01-24 ENCOUNTER — OFFICE VISIT (OUTPATIENT)
Dept: FAMILY MEDICINE CLINIC | Facility: CLINIC | Age: 28
End: 2023-01-24
Payer: COMMERCIAL

## 2023-01-24 VITALS
HEART RATE: 93 BPM | DIASTOLIC BLOOD PRESSURE: 78 MMHG | WEIGHT: 269 LBS | SYSTOLIC BLOOD PRESSURE: 138 MMHG | OXYGEN SATURATION: 98 % | TEMPERATURE: 96.4 F | HEIGHT: 66 IN | BODY MASS INDEX: 43.23 KG/M2

## 2023-01-24 DIAGNOSIS — R05.1 ACUTE COUGH: ICD-10-CM

## 2023-01-24 DIAGNOSIS — J45.41 MODERATE PERSISTENT ASTHMA WITH ACUTE EXACERBATION: Primary | ICD-10-CM

## 2023-01-24 DIAGNOSIS — G47.00 INSOMNIA, UNSPECIFIED TYPE: ICD-10-CM

## 2023-01-24 PROCEDURE — 99214 OFFICE O/P EST MOD 30 MIN: CPT | Performed by: NURSE PRACTITIONER

## 2023-01-24 RX ORDER — PREDNISONE 20 MG/1
TABLET ORAL
Qty: 9 TABLET | Refills: 0 | Status: SHIPPED | OUTPATIENT
Start: 2023-01-24 | End: 2023-01-29

## 2023-01-24 RX ORDER — TRAZODONE HYDROCHLORIDE 50 MG/1
25-50 TABLET ORAL NIGHTLY PRN
Qty: 30 TABLET | Refills: 0 | Status: SHIPPED | OUTPATIENT
Start: 2023-01-24

## 2023-01-24 NOTE — ASSESSMENT & PLAN NOTE
I will start her on trazodone 50 mg at night as needed, advised her to start half a tablet first and may increase to full tablet if half a tablet is not sufficient to help her sleep.

## 2023-01-24 NOTE — ASSESSMENT & PLAN NOTE
Asthma is Worsening with an acute exacerbation.  The patient is experiencing frequent daytime asthma symptoms. She is experiencing frequent nighttime asthma symptoms.  Asthma information handout given.  Discussed monitoring symptoms and use of quick-relief medications and contacting us early in the course of exacerbations.  Warning signs of respiratory distress were reviewed with the patient.   Medications: discontinue Albuterol inhaler due to side effect, begin Prednisone Dosepak for 5 days and resume Xopenex inhaler, advised to use every 4-6 hours for the next few days and then as needed.  Follow up in 3 months, or sooner should new symptoms or problems arise.  See AVS handout for asthma.

## 2023-01-24 NOTE — LETTER
January 24, 2023     Patient: Zhane Traylor   YOB: 1995   Date of Visit: 1/24/2023       To Whom It May Concern:    It is my medical opinion that Zhane Traylor may return to work in one day on 1/25/2023.            Sincerely,        CLEO Garza    CC: No Recipients

## 2023-01-27 ENCOUNTER — OFFICE VISIT (OUTPATIENT)
Dept: OBSTETRICS AND GYNECOLOGY | Facility: CLINIC | Age: 28
End: 2023-01-27
Payer: COMMERCIAL

## 2023-01-27 VITALS
SYSTOLIC BLOOD PRESSURE: 129 MMHG | HEIGHT: 66 IN | WEIGHT: 272.2 LBS | DIASTOLIC BLOOD PRESSURE: 84 MMHG | HEART RATE: 102 BPM | BODY MASS INDEX: 43.75 KG/M2

## 2023-01-27 DIAGNOSIS — B37.2 CANDIDIASIS, INTERTRIGO: ICD-10-CM

## 2023-01-27 DIAGNOSIS — Z01.419 WELL WOMAN EXAM: Primary | ICD-10-CM

## 2023-01-27 DIAGNOSIS — N89.8 VAGINAL IRRITATION: ICD-10-CM

## 2023-01-27 DIAGNOSIS — Z80.3 FAMILY HISTORY OF BREAST CANCER: ICD-10-CM

## 2023-01-27 LAB
C TRACH RRNA CVX QL NAA+PROBE: NOT DETECTED
CANDIDA SPECIES: NEGATIVE
GARDNERELLA VAGINALIS: NEGATIVE
N GONORRHOEA RRNA SPEC QL NAA+PROBE: NOT DETECTED
T VAGINALIS DNA VAG QL PROBE+SIG AMP: NEGATIVE

## 2023-01-27 PROCEDURE — 87660 TRICHOMONAS VAGIN DIR PROBE: CPT | Performed by: NURSE PRACTITIONER

## 2023-01-27 PROCEDURE — 99395 PREV VISIT EST AGE 18-39: CPT | Performed by: NURSE PRACTITIONER

## 2023-01-27 PROCEDURE — 87591 N.GONORRHOEAE DNA AMP PROB: CPT | Performed by: NURSE PRACTITIONER

## 2023-01-27 PROCEDURE — 87480 CANDIDA DNA DIR PROBE: CPT | Performed by: NURSE PRACTITIONER

## 2023-01-27 PROCEDURE — 2014F MENTAL STATUS ASSESS: CPT | Performed by: NURSE PRACTITIONER

## 2023-01-27 PROCEDURE — G0123 SCREEN CERV/VAG THIN LAYER: HCPCS | Performed by: NURSE PRACTITIONER

## 2023-01-27 PROCEDURE — 87491 CHLMYD TRACH DNA AMP PROBE: CPT | Performed by: NURSE PRACTITIONER

## 2023-01-27 PROCEDURE — 87510 GARDNER VAG DNA DIR PROBE: CPT | Performed by: NURSE PRACTITIONER

## 2023-01-27 PROCEDURE — 3008F BODY MASS INDEX DOCD: CPT | Performed by: NURSE PRACTITIONER

## 2023-01-27 RX ORDER — NYSTATIN 100000 [USP'U]/G
1 POWDER TOPICAL 3 TIMES DAILY
Qty: 60 G | Refills: 0 | Status: SHIPPED | OUTPATIENT
Start: 2023-01-27 | End: 2023-02-15

## 2023-01-27 NOTE — PROGRESS NOTES
"  HPI:   27 y.o. . Presents for well woman exam. Contraception or HRT: Contraception:  Nexplanon  Menses:   Spotting lasting 7 days, menses occur an average of every 2 months, changes products q 6 hrs on heaviest days.   Pain:  Mild, OTC meds control discomfort  Last pap: normal   Complaints: red, itchy moist area in lower abdominal groin folds, vaginal itch    Bilateral breast tenderness radiating to back for the past 7 months, left breast larger and tenderness greater on left side    Working on weightloss, lost over 20 pounds with reducing caloric intake       Past Medical History:   Diagnosis Date   • Alcoholism in remission (HCC) 2021   • Anxiety disorder 2021   • Asthma    • Constipation    • Encounter for blood transfusion    • H/O psychiatric care    • Hypertension    • Left anterior cruciate ligament tear    • Major depression 2021   • Obesity    • Onychomycosis    • PONV (postoperative nausea and vomiting)       Past Surgical History:   Procedure Laterality Date   • DILATATION AND CURETTAGE     • KNEE ACL RECONSTRUCTION Left 2022    Procedure: LEFT KNEE ATHROSCOPIC ANTERIOR CRUCIATE LIGAMENT RECONSTRUCTION  WITH  ALLOGRAFT;  Surgeon: Troy Hsu MD;  Location: [facility] [place];  Service: Orthopedics;  Laterality: Left;   • WISDOM TOOTH EXTRACTION        Family History   Problem Relation Age of Onset   • Atrial fibrillation Father    • Heart failure Paternal Grandfather    • Heart failure Paternal Grandmother    • Breast cancer Paternal Grandmother    • Heart failure Maternal Grandmother    • Heart failure Maternal Grandfather    • Breast cancer Paternal Aunt    • Breast cancer Paternal Great-Grandmother    • Ovarian cancer Neg Hx    • Uterine cancer Neg Hx    • Prostate cancer Neg Hx    • Colon cancer Neg Hx      Allergies as of 2023   • (No Known Allergies)        PCP: does manage PMHx and preventative labs    /84   Pulse 102   Ht 167.6 cm (66\")   Wt " 123 kg (272 lb 3.2 oz)   BMI 43.93 kg/m²     PHYSICAL EXAM: Chaperone present   General- NAD, alert and oriented, appropriate  Psych- Normal mood, good memory  Neck- No masses, no thyroid enlargement  Lymphatic- No palpable neck, axillary, or groin nodes  CV- Regular rhythm, no murmurs  Resp- CTA to bases, no wheezes  Abdomen- Soft, non distended, non tender, no masses  Breast left-  Bilaterally symmetrical, no masses, non tender, no nipple discharge  Breast right- Bilaterally symmetrical, no masses, non tender, no nipple discharge  External genitalia- Normal female, no lesions  Urethra/meatus- Normal, no masses, non tender, no prolapse  Bladder- Normal, no masses, non tender, no prolapse  Vagina- Normal, no atrophy, no lesions, small amount tan discharge, no prolapse  Cvx- Normal, no lesions, no discharge, No cervical motion tenderness  Uterus- Normal size, shape & consistency.  Non tender, mobile, & no prolapse  Adnexa- No mass, non tender  Anus/Rectum/Perineum- Not performed  Ext- No edema, no cyanosis    Skin- No lesions, mild erythema thigh/groin folds,  nexplanon present left bicept groove    ASSESSMENT and PLAN:    Diagnoses and all orders for this visit:    1. Well woman exam (Primary)  -     IGP,rfx Aptima HPV All Pth    2. Family history of breast cancer  -     [facility] Hereditary Cancer Screen    3. Vaginal irritation  -     Gardnerella vaginalis, Trichomonas vaginalis, Candida albicans, DNA - Swab, Vagina  -     Chlamydia trachomatis, Neisseria gonorrhoeae, PCR - Swab, Cervix    4. Candidiasis, intertrigo  -     nystatin (MYCOSTATIN) 759659 UNIT/GM powder; Apply 1 application topically to the appropriate area as directed 3 (Three) Times a Day for 14 days.  Dispense: 60 g; Refill: 0      Preventative:   BREAST HEALTH- Monthly self breast exam importance and how to reviewed. MMG and/or MRI (prn) reviewed per society guidelines and her individual history. Screen: Updated today  CERVICAL CANCER  Screening- Reviewed current ASCCP guidelines for screening w and wo cotest HPV, age specific.  Screen: Updated today  COLON CANCER Screening- Reviewed current medical society guidelines and options.  Screen:  Not medically needed  SEXUAL HEALTH: STD screening recommended.  Ordered,  Safe sex and condoms  BONE HEALTH- Reviewed current medical society guidelines and options for testing, calcium and vit D intake.  Weight bearing exercise.  DEXA: Not medically needed  VACCINATIONS Recommended: Vaccination are up to date.  Importance discussed, risk being unvaccinated reviewed.  Questions answered  Smoking status- NON SMOKER  Follow up PCP/Specialist PMHx and Labs  [facility]: Counseled and evaluated for genetic testing.  Testing accepted.  SAFE SEX/condoms importance reviewed.    fu pcp discuss back pain related to large breast, well fitted bra for support, limit caffeine, await genetic test results    She understands the importance of having any ordered tests to be performed in a timely fashion.  The risks of not performing them include, but are not limited to, advanced cancer stages, bone loss from osteoporosis and/or subsequent increase in morbidity and/or mortality.  She is encouraged to review her results online and/or contact or office if she has questions.     Follow Up:  Return for 6 weeks myriad.        Rupal Roberts, APRN  01/27/2023

## 2023-01-31 ENCOUNTER — TELEPHONE (OUTPATIENT)
Dept: FAMILY MEDICINE CLINIC | Facility: CLINIC | Age: 28
End: 2023-01-31

## 2023-01-31 ENCOUNTER — OFFICE VISIT (OUTPATIENT)
Dept: FAMILY MEDICINE CLINIC | Facility: CLINIC | Age: 28
End: 2023-01-31
Payer: COMMERCIAL

## 2023-01-31 VITALS
HEART RATE: 90 BPM | SYSTOLIC BLOOD PRESSURE: 148 MMHG | DIASTOLIC BLOOD PRESSURE: 78 MMHG | TEMPERATURE: 96.3 F | OXYGEN SATURATION: 100 %

## 2023-01-31 DIAGNOSIS — J45.41 MODERATE PERSISTENT ASTHMA WITH ACUTE EXACERBATION: Primary | ICD-10-CM

## 2023-01-31 DIAGNOSIS — J45.42 MODERATE PERSISTENT ASTHMA WITH STATUS ASTHMATICUS: Primary | ICD-10-CM

## 2023-01-31 DIAGNOSIS — R09.81 SINUS CONGESTION: ICD-10-CM

## 2023-01-31 PROCEDURE — 99214 OFFICE O/P EST MOD 30 MIN: CPT | Performed by: NURSE PRACTITIONER

## 2023-01-31 RX ORDER — PREDNISONE 20 MG/1
20 TABLET ORAL 2 TIMES DAILY
Qty: 10 TABLET | Refills: 0 | Status: SHIPPED | OUTPATIENT
Start: 2023-01-31 | End: 2023-02-06

## 2023-01-31 RX ORDER — AZITHROMYCIN 250 MG/1
TABLET, FILM COATED ORAL
Qty: 6 TABLET | Refills: 0 | Status: SHIPPED | OUTPATIENT
Start: 2023-01-31 | End: 2023-02-06

## 2023-01-31 RX ORDER — LEVALBUTEROL INHALATION SOLUTION 1.25 MG/3ML
1 SOLUTION RESPIRATORY (INHALATION) EVERY 4 HOURS PRN
Qty: 375 ML | Refills: 5 | Status: SHIPPED | OUTPATIENT
Start: 2023-01-31

## 2023-01-31 NOTE — PROGRESS NOTES
Chief Complaint  Cough, Asthma, and Sinusitis    Subjective          Zhane Traylor, 27 y.o. female presents to [facility]  History of Present Illness   Patient presents today for ongoing cough that has not resolved. She has asthma and reports having wheezing. Patient was recently seen and prescribed steroids to assist with cough. She states that she feels her symptoms improved around day three and then just got worse again. She states she coughs all night and is unable to get relief.    She is also complaining of sinus congestion in her nose. She denies any fevers, however she does report a productive cough with yellow secretions.  She denies sore throat.  She states she has had strep throat before, states does not feel like strep throat, and declines to be swabbed for strep throat.      Tobacco Use: Medium Risk   • Smoking Tobacco Use: Former   • Smokeless Tobacco Use: Never   • Passive Exposure: Never      Objective   Vital Signs:   /78 (BP Location: Left arm, Patient Position: Sitting)   Pulse 90   Temp 96.3 °F (35.7 °C) (Temporal)   SpO2 100%       Current Outpatient Medications:   •  amLODIPine (NORVASC) 5 MG tablet, Take 1 tablet by mouth Daily., Disp: 30 tablet, Rfl: 1  •  azithromycin (ZITHROMAX) 250 MG tablet, Take 2 tablets by mouth Daily for 1 day, THEN 1 tablet Daily for 4 days., Disp: 6 tablet, Rfl: 0  •  cetirizine (zyrTEC) 10 MG tablet, Zyrtec 10 mg oral tablet take 1 tablet (10 mg) by oral route once daily   Active, Disp: , Rfl:   •  clonazePAM (KlonoPIN) 1 MG tablet, Take 1 tablet by mouth twice per day, Disp: 60 tablet, Rfl: 0  •  Ergocalciferol (VITAMIN D2 PO), Take 1 capsule by mouth Daily., Disp: , Rfl:   •  Etonogestrel (Nexplanon) 68 MG implant subdermal implant, Inject 68 mg into the appropriate area of the skin as directed by provider., Disp: , Rfl:   •  Fluticasone-Salmeterol (ADVAIR/WIXELA) 250-50 MCG/ACT DISKUS, Inhale 1 puff 2 (Two) Times a  Day. For asthma. Rinse mouth after use, Disp: 60 each, Rfl: 3  •  lamoTRIgine (LaMICtal) 100 MG tablet, Take 1 tablet by mouth twice daily, Disp: 60 tablet, Rfl: 3  •  Latuda 40 MG tablet tablet, Take 1 tablet by mouth every night at bedtime., Disp: 30 tablet, Rfl: 2  •  levalbuterol (XOPENEX HFA) 45 MCG/ACT inhaler, Inhale 1-2 puffs Every 4 (Four) Hours As Needed for Wheezing or Shortness of Air., Disp: 15 g, Rfl: 5  •  levalbuterol (Xopenex) 1.25 MG/3ML nebulizer solution, Take 1 ampule by nebulization Every 4 (Four) Hours As Needed for Wheezing., Disp: 375 mL, Rfl: 5  •  losartan (Cozaar) 50 MG tablet, Take 1 tablet by mouth Daily., Disp: 90 tablet, Rfl: 3  •  lurasidone (Latuda) 40 MG tablet tablet, Take 1 tablet by mouth every night at bedtime., Disp: 30 tablet, Rfl: 3  •  lurasidone HCl (Latuda) 60 MG tablet tablet, Take 1 tablet by mouth every night at bedtime., Disp: 30 tablet, Rfl: 0  •  meloxicam (MOBIC) 15 MG tablet, 1 tablet by mouth daily, Disp: 90 tablet, Rfl: 0  •  metoprolol succinate XL (Toprol XL) 25 MG 24 hr tablet, Take 1 tablet by mouth Daily., Disp: 30 tablet, Rfl: 2  •  montelukast (Singulair) 10 MG tablet, Take 1 tablet by mouth Every Night., Disp: 30 tablet, Rfl: 5  •  nitrofurantoin, macrocrystal-monohydrate, (MACROBID) 100 MG capsule, Take 1 capsule by mouth 2 (Two) Times a Day., Disp: 20 capsule, Rfl: 0  •  nystatin (MYCOSTATIN) 223953 UNIT/GM powder, Apply 1 application topically to the appropriate area as directed 3 (Three) Times a Day for 14 days., Disp: 60 g, Rfl: 0  •  predniSONE (DELTASONE) 20 MG tablet, Take 1 tablet by mouth 2 (Two) Times a Day for 5 days., Disp: 10 tablet, Rfl: 0  •  ramelteon (Rozerem) 8 MG tablet, take 1 tablet by mouth at bedtime, Disp: 30 tablet, Rfl: 3  •  Retin-A 0.025 % cream, apply daily after washing, Disp: 45 g, Rfl: 2  •  spironolactone (ALDACTONE) 25 MG tablet, Take 1 tablet by mouth 3 (Three) Times a Day., Disp: 90 tablet, Rfl: 5  •  traZODone  (DESYREL) 50 MG tablet, Take 0.5-1 tablets by mouth At Night As Needed for Sleep., Disp: 30 tablet, Rfl: 0  •  tretinoin microspheres (Retin-A Micro) 0.04 % gel, Apply a pea-size amount to face every other day, Disp: 45 g, Rfl: 2   Past Medical History:   Diagnosis Date   • Alcoholism in remission (HCC) 02/05/2021   • Anxiety disorder 01/05/2021   • Asthma    • Constipation    • Encounter for blood transfusion    • H/O psychiatric care    • Hypertension    • Left anterior cruciate ligament tear    • Major depression 02/05/2021   • Obesity    • Onychomycosis    • PONV (postoperative nausea and vomiting)       Physical Exam  Vitals reviewed.   Constitutional:       Appearance: Normal appearance. She is well-developed.   HENT:      Right Ear: Hearing, tympanic membrane, ear canal and external ear normal.      Left Ear: Hearing, tympanic membrane, ear canal and external ear normal.      Mouth/Throat:      Mouth: Mucous membranes are moist.      Pharynx: Oropharyngeal exudate and posterior oropharyngeal erythema present. No pharyngeal swelling.   Neck:      Thyroid: No thyroid mass, thyromegaly or thyroid tenderness.   Cardiovascular:      Rate and Rhythm: Normal rate and regular rhythm.      Heart sounds: No murmur heard.    No friction rub. No gallop.   Pulmonary:      Effort: Pulmonary effort is normal. No tachypnea, accessory muscle usage or respiratory distress.      Breath sounds: Normal breath sounds and air entry. No wheezing or rhonchi.   Lymphadenopathy:      Cervical: No cervical adenopathy.   Skin:     General: Skin is warm and dry.   Neurological:      Mental Status: She is alert and oriented to person, place, and time.      Cranial Nerves: No cranial nerve deficit.   Psychiatric:         Attention and Perception: Attention normal.         Mood and Affect: Mood and affect normal.         Behavior: Behavior normal.         Thought Content: Thought content normal. Thought content does not include homicidal or  suicidal ideation.         Judgment: Judgment normal.        Result Review :                 Assessment and Plan    Diagnoses and all orders for this visit:    1. Moderate persistent asthma with status asthmaticus (Primary)  Assessment & Plan:  Asthma is Slowly improving but still persistent.  The patient is experiencing frequent daytime asthma symptoms. She is experiencing frequent nighttime asthma symptoms.  Medications: begin Xopenex inhalations via Nebulizer.  Continue Advair inhaler twice daily and Singulair daily for prevention.  I will give her prednisone 20 mg twice daily for an additional five days to assist with symptom management.        Orders:  -     predniSONE (DELTASONE) 20 MG tablet; Take 1 tablet by mouth 2 (Two) Times a Day for 5 days.  Dispense: 10 tablet; Refill: 0    2. Sinus congestion  Comments:  Take Zpack as prescribed. Complete entire regimen.  Call or follow-up if no improvement or any worsening of symptoms.  Orders:  -     azithromycin (ZITHROMAX) 250 MG tablet; Take 2 tablets by mouth Daily for 1 day, THEN 1 tablet Daily for 4 days.  Dispense: 6 tablet; Refill: 0  -     predniSONE (DELTASONE) 20 MG tablet; Take 1 tablet by mouth 2 (Two) Times a Day for 5 days.  Dispense: 10 tablet; Refill: 0      Follow Up   Return for Keep Scheduled Follow-up.  Advised to call or follow-up sooner if she has any new or worsening of symptoms.  Patient was given instructions and counseling regarding her condition or for health maintenance advice. Please see specific information pulled into the AVS if appropriate.     Patient was also seen by Maida Mcdowell, [employer] student.    Parts of this note are electronic transcriptions/translations of spoken language to printed text using the Dragon Dictation system.      Sonya Squires, APRN  01/31/2023

## 2023-01-31 NOTE — TELEPHONE ENCOUNTER
Yes, she would qualify for nebulizer due to her asthma.  Does she want to come here today and  a nebulizer machine?  I can then send the medication to a pharmacy.  Also, if she is still having a cough, I am going to go ahead and send her an antibiotic.  Ask her which pharmacy she wants me to send medications to

## 2023-01-31 NOTE — TELEPHONE ENCOUNTER
Caller: Zhane Traylor    Relationship: Self    Best call back number:    [phone]       What medication are you requesting: NEBULIZER    What are your current symptoms: COUGH    How long have you been experiencing symptoms: WAS SEEN IN OFFICE A WEEK AGO    Have you had these symptoms before:    [x] Yes  [] No    Have you been treated for these symptoms before:   [x] Yes  [] No    If a prescription is needed, what is your preferred pharmacy and phone number: [facility] [place]     Additional notes:PATIENT STATES THAT SHE IS FINISHED WITH HER PREDNISONE AND IS WANTING TO KNOW IF SHE MIGHT BE AN APPROPRIATE CANDIDATE FOR A NEBULIZER. SHE STATES THAT SHE IS STILL HAVING A PERSISTENT COUGH AND IS ASKING FOR A CALL BACK PLEASE. PATIENT STATES THAT SHE HAS NOT USED A NEBULIZER IN THE PAST

## 2023-01-31 NOTE — TELEPHONE ENCOUNTER
Caller: Zhane Traylor    Relationship: Self    Best call back number:[phone]    What is the best time to reach you: ANY    Who are you requesting to speak with (clinical staff, provider,  specific staff member): CLINICAL     What was the call regarding: PATIENT STATED HER PHARMACY WILL NEED A PA ON levalbuterol (XOPENEX HFA) 45 MCG/ACT inhaler BEFORE IT CAN BE FILLED     Do you require a callback: IF NEEDED

## 2023-01-31 NOTE — ASSESSMENT & PLAN NOTE
Asthma is Slowly improving but still persistent.  The patient is experiencing frequent daytime asthma symptoms. She is experiencing frequent nighttime asthma symptoms.  Medications: begin Xopenex inhalations via Nebulizer.  Continue Advair inhaler twice daily and Singulair daily for prevention.  I will give her prednisone 20 mg twice daily for an additional five days to assist with symptom management.

## 2023-02-01 ENCOUNTER — LAB (OUTPATIENT)
Dept: LAB | Facility: HOSPITAL | Age: 28
End: 2023-02-01
Payer: COMMERCIAL

## 2023-02-01 DIAGNOSIS — I10 ESSENTIAL HYPERTENSION: ICD-10-CM

## 2023-02-01 LAB
ANION GAP SERPL CALCULATED.3IONS-SCNC: 9 MMOL/L (ref 5–15)
BUN SERPL-MCNC: 23 MG/DL (ref 6–20)
BUN/CREAT SERPL: 20.9 (ref 7–25)
CALCIUM SPEC-SCNC: 9.4 MG/DL (ref 8.6–10.5)
CHLORIDE SERPL-SCNC: 105 MMOL/L (ref 98–107)
CO2 SERPL-SCNC: 25 MMOL/L (ref 22–29)
CREAT SERPL-MCNC: 1.1 MG/DL (ref 0.57–1)
EGFRCR SERPLBLD CKD-EPI 2021: 71.2 ML/MIN/1.73
GLUCOSE SERPL-MCNC: 114 MG/DL (ref 65–99)
POTASSIUM SERPL-SCNC: 4.2 MMOL/L (ref 3.5–5.2)
SODIUM SERPL-SCNC: 139 MMOL/L (ref 136–145)

## 2023-02-01 PROCEDURE — 36415 COLL VENOUS BLD VENIPUNCTURE: CPT

## 2023-02-01 PROCEDURE — 80048 BASIC METABOLIC PNL TOTAL CA: CPT

## 2023-02-02 LAB
CONV .: NORMAL
CYTOLOGIST CVX/VAG CYTO: NORMAL
CYTOLOGY CVX/VAG DOC CYTO: NORMAL
CYTOLOGY CVX/VAG DOC THIN PREP: NORMAL
DX ICD CODE: NORMAL
HIV 1 & 2 AB SER-IMP: NORMAL
OTHER STN SPEC: NORMAL
STAT OF ADQ CVX/VAG CYTO-IMP: NORMAL

## 2023-02-03 ENCOUNTER — TELEPHONE (OUTPATIENT)
Dept: CARDIOLOGY | Facility: CLINIC | Age: 28
End: 2023-02-03
Payer: COMMERCIAL

## 2023-02-03 NOTE — TELEPHONE ENCOUNTER
Patient called with regarding increased SOA and palpitations. Patient reports even with the medications- Patient is not currently taking steroids PCP discontinued. Patient reports increased dizzy spells and increase in fatigue.     Patient is reqeusting her ECHO be moved up. Currently scheduled at the hospital for 2/27/23

## 2023-02-05 NOTE — TELEPHONE ENCOUNTER
If we can accommodate moving the echo up sooner here in the clinic, I can place a new order for clinic.  We can also likely move her Holter study up as well.  Please check on both.

## 2023-02-07 DIAGNOSIS — R42 EPISODIC LIGHTHEADEDNESS: ICD-10-CM

## 2023-02-07 DIAGNOSIS — R00.2 INTERMITTENT PALPITATIONS: Primary | ICD-10-CM

## 2023-02-07 NOTE — TELEPHONE ENCOUNTER
VÍCTOR patient. Patient states she continues to feel tired, palpitations are better- continues to feel dizzy with change in positions- bending over to standing feels dizzy for a moment. Patient states it is some better and would be appreciative if she can get her appointment in the office prior to the 27th-     Please change order from hospital to clinic for ECHO and holter.    Patient is agreeable to 2/23/23,Thursday- as this would work better with her work schedule.

## 2023-02-16 LAB — REF LAB TEST METHOD: NORMAL

## 2023-02-20 DIAGNOSIS — I10 PRIMARY HYPERTENSION: ICD-10-CM

## 2023-02-20 DIAGNOSIS — R00.0 TACHYCARDIA: ICD-10-CM

## 2023-02-20 RX ORDER — AMLODIPINE BESYLATE 5 MG/1
5 TABLET ORAL DAILY
Qty: 30 TABLET | Refills: 1 | Status: SHIPPED | OUTPATIENT
Start: 2023-02-20

## 2023-03-02 RX ORDER — METOPROLOL SUCCINATE 50 MG/1
50 TABLET, EXTENDED RELEASE ORAL DAILY
Qty: 90 TABLET | Refills: 3 | Status: SHIPPED | OUTPATIENT
Start: 2023-03-02 | End: 2023-03-17 | Stop reason: SDUPTHER

## 2023-03-17 RX ORDER — METOPROLOL SUCCINATE 50 MG/1
50 TABLET, EXTENDED RELEASE ORAL DAILY
Qty: 90 TABLET | Refills: 3 | Status: SHIPPED | OUTPATIENT
Start: 2023-03-17 | End: 2023-03-28 | Stop reason: SDUPTHER

## 2023-03-17 NOTE — TELEPHONE ENCOUNTER
Caller: LaytonZhane    Relationship: Self    Best call back number: [phone]  Requested Prescriptions:   Requested Prescriptions     Pending Prescriptions Disp Refills   • metoprolol succinate XL (TOPROL-XL) 50 MG 24 hr tablet 90 tablet 3     Sig: Take 1 tablet by mouth Daily.        Pharmacy where request should be sent: [facility]     Additional details provided by patient: PT WAS TOLD TO DOUBLE HER METOPROLOL 25 MG TO 50 MG AND IS NOW CURRENTLY AT ABOUT A 3 DY SUPPLY. THE PHARMACY WILL NOT FILL THE MEDICATION UNTIL 3.24.23 DUE TO HER PRIOR DOSAGE. THEY ALREADY HAVE THR NEW SCRIPT BUT NEED AUTHORIZATION SINCE SHE IS OUT SOONER THAN EXPECTED. PLEASE ADVISE, THANK YOU.    Does the patient have less than a 3 day supply:  [x] Yes  [] No    Would you like a call back once the refill request has been completed: [] Yes [] No    If the office needs to give you a call back, can they leave a voicemail: [] Yes [] No    Mya Lopez Rep   03/17/23 11:22 EDT

## 2023-03-20 ENCOUNTER — TELEPHONE (OUTPATIENT)
Dept: CARDIOLOGY | Facility: CLINIC | Age: 28
End: 2023-03-20
Payer: COMMERCIAL

## 2023-03-20 NOTE — TELEPHONE ENCOUNTER
----- Message from Kacie Echols sent at 3/17/2023  9:30 AM EDT -----  Regarding: FW: Holter Results  Contact: [phone]    ----- Message -----  From: Zhane Traylor  Sent: 3/17/2023   9:06 AM EDT  [facility]  Subject: Holter Results                                   I checked with the pharmacy and I still don’t have a prescription for the metoprolol for 50 mg.

## 2023-03-20 NOTE — TELEPHONE ENCOUNTER
----- Message from Kacie Echols sent at 3/17/2023  9:30 AM EDT -----  Regarding: FW: Holter Results  Contact: [phone]    ----- Message -----  From: Zhane Traylor  Sent: 3/17/2023   9:06 AM EDT  [facility] [place]  Subject: Holter Results                                   I checked with the pharmacy and I still don’t have a prescription for the metoprolol for 50 mg.

## 2023-03-21 ENCOUNTER — HOSPITAL ENCOUNTER (OUTPATIENT)
Dept: CARDIOLOGY | Facility: HOSPITAL | Age: 28
Discharge: HOME | End: 2023-03-21
Admitting: INTERNAL MEDICINE
Payer: COMMERCIAL

## 2023-03-21 DIAGNOSIS — R42 EPISODIC LIGHTHEADEDNESS: ICD-10-CM

## 2023-03-21 DIAGNOSIS — G90.A POSTURAL ORTHOSTATIC TACHYCARDIA SYNDROME (POTS): ICD-10-CM

## 2023-03-21 LAB
MAXIMAL PREDICTED HEART RATE: 194 BPM
STRESS TARGET HR: 165 BPM

## 2023-03-21 PROCEDURE — 93660 TILT TABLE EVALUATION: CPT

## 2023-03-21 PROCEDURE — 93660 TILT TABLE EVALUATION: CPT | Performed by: INTERNAL MEDICINE

## 2023-03-21 NOTE — DISCHARGE INSTRUCTIONS
Cardiovascular Services Phone Number: [phone]    Normal activities may be resumed after you are released from the hospital.  Normal consumption of foods and liquids may be resumed immediately after the study.  Contact your physician who directed your tilt table study if you experience any symptoms again.  Resume your medications, following your doctor's instructions.  Notify your doctor if you experience your symptoms while taking your medications  Do not stop taking your medication without notifying your doctor.    In the event of an emergency, call 911 or go to your nearest emergency room.

## 2023-03-23 ENCOUNTER — HOSPITAL ENCOUNTER (OUTPATIENT)
Dept: SLEEP MEDICINE | Facility: HOSPITAL | Age: 28
Discharge: HOME | End: 2023-03-23
Admitting: INTERNAL MEDICINE
Payer: COMMERCIAL

## 2023-03-23 DIAGNOSIS — E66.01 CLASS 3 SEVERE OBESITY DUE TO EXCESS CALORIES WITH SERIOUS COMORBIDITY AND BODY MASS INDEX (BMI) OF 40.0 TO 44.9 IN ADULT: ICD-10-CM

## 2023-03-23 DIAGNOSIS — J45.20 MILD INTERMITTENT ASTHMA WITHOUT COMPLICATION: ICD-10-CM

## 2023-03-23 PROCEDURE — 95806 SLEEP STUDY UNATT&RESP EFFT: CPT

## 2023-03-23 PROCEDURE — 95806 SLEEP STUDY UNATT&RESP EFFT: CPT | Performed by: INTERNAL MEDICINE

## 2023-03-27 ENCOUNTER — TELEPHONE (OUTPATIENT)
Dept: FAMILY MEDICINE CLINIC | Facility: CLINIC | Age: 28
End: 2023-03-27

## 2023-03-27 NOTE — TELEPHONE ENCOUNTER
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

## 2023-03-28 ENCOUNTER — OFFICE VISIT (OUTPATIENT)
Dept: CARDIOLOGY | Facility: CLINIC | Age: 28
End: 2023-03-28
Payer: COMMERCIAL

## 2023-03-28 VITALS
WEIGHT: 270 LBS | HEART RATE: 83 BPM | HEIGHT: 66 IN | SYSTOLIC BLOOD PRESSURE: 121 MMHG | BODY MASS INDEX: 43.39 KG/M2 | DIASTOLIC BLOOD PRESSURE: 79 MMHG

## 2023-03-28 DIAGNOSIS — I10 ESSENTIAL HYPERTENSION: Primary | ICD-10-CM

## 2023-03-28 DIAGNOSIS — R00.2 INTERMITTENT PALPITATIONS: ICD-10-CM

## 2023-03-28 DIAGNOSIS — R55 VASOVAGAL SYNCOPE: ICD-10-CM

## 2023-03-28 PROCEDURE — 1159F MED LIST DOCD IN RCRD: CPT | Performed by: FAMILY MEDICINE

## 2023-03-28 PROCEDURE — 3074F SYST BP LT 130 MM HG: CPT | Performed by: FAMILY MEDICINE

## 2023-03-28 PROCEDURE — 1160F RVW MEDS BY RX/DR IN RCRD: CPT | Performed by: FAMILY MEDICINE

## 2023-03-28 PROCEDURE — 3078F DIAST BP <80 MM HG: CPT | Performed by: FAMILY MEDICINE

## 2023-03-28 PROCEDURE — 99214 OFFICE O/P EST MOD 30 MIN: CPT | Performed by: FAMILY MEDICINE

## 2023-03-28 RX ORDER — METOPROLOL SUCCINATE 50 MG/1
TABLET, EXTENDED RELEASE ORAL
Qty: 135 TABLET | Refills: 3 | Status: SHIPPED | OUTPATIENT
Start: 2023-03-28

## 2023-03-28 NOTE — PROGRESS NOTES
Chief Complaint  Follow-up, Palpitations, and Rapid Heart Rate    Subjective        History of Present Illness  Zhane Traylor presents to [facility] CARDIOLOGY   Zhane Traylor is a 27-year-old female patient coming in today to follow-up on recent testing.  She was seen previously for exacerbation of hypertension, and complaining of lightheaded dizzy sensation with movements.  She recently underwent tilt table test which was not significant for any evidence of POTS syndrome.  Since her antihypertensive medication was adjusted, she reports feeling better overall, she occasionally has a very mild sensation of palpitation but enies any further dizziness, lightheadedness, and no syncopal episodes. Tolerating blood pressure medications well.        PMH  Past Medical History:   Diagnosis Date   • Alcoholism in remission 2021   • Anxiety disorder 2021   • Asthma    • Constipation    • Encounter for blood transfusion    • H/O psychiatric care    • Hypertension    • Left anterior cruciate ligament tear    • Major depression 2021   • Obesity    • Onychomycosis    • PONV (postoperative nausea and vomiting)          ALLERGY  No Known Allergies       SURGICALHX  Past Surgical History:   Procedure Laterality Date   • DILATATION AND CURETTAGE     • KNEE ACL RECONSTRUCTION Left 2022    Procedure: LEFT KNEE ATHROSCOPIC ANTERIOR CRUCIATE LIGAMENT RECONSTRUCTION  WITH  ALLOGRAFT;  Surgeon: Troy Hsu MD;  [place]: [facility] [place];  Service: Orthopedics;  Laterality: Left;   • WISDOM TOOTH EXTRACTION            SOC  Social History     Socioeconomic History   • Marital status: Single   Tobacco Use   • Smoking status: Former     Packs/day: 0.50     Years: 6.00     Pack years: 3.00     Types: Cigarettes     Start date: 10/19/2010     Quit date: 10/19/2016     Years since quittin.4     Passive exposure: Never   • Smokeless tobacco: Never   Vaping Use   • Vaping Use: Never used    Substance and Sexual Activity   • Alcohol use: Not Currently     Comment: LAST DRINK 02/2021 alcoholism in remission   • Drug use: Never   • Sexual activity: Not Currently     Partners: Male     Birth control/protection: Nexplanon     Comment: 3-30-21         FAMHX  Family History   Problem Relation Age of Onset   • Atrial fibrillation Father    • Heart failure Paternal Grandfather    • Heart failure Paternal Grandmother    • Breast cancer Paternal Grandmother    • Heart failure Maternal Grandmother    • Heart failure Maternal Grandfather    • Breast cancer Paternal Aunt    • Breast cancer Paternal Great-Grandmother    • Ovarian cancer Neg Hx    • Uterine cancer Neg Hx    • Prostate cancer Neg Hx    • Colon cancer Neg Hx           MEDSIGONLY  Current Outpatient Medications on File Prior to Visit   Medication Sig   • amLODIPine (NORVASC) 5 MG tablet Take 1 tablet by mouth Daily.   • cetirizine (zyrTEC) 10 MG tablet Zyrtec 10 mg oral tablet take 1 tablet (10 mg) by oral route once daily   Active   • diazePAM (VALIUM) 2 MG tablet Take 1 tablet by mouth twice daily   • Ergocalciferol (VITAMIN D2 PO) Take 1 capsule by mouth Daily.   • Etonogestrel (Nexplanon) 68 MG implant subdermal implant Inject 1 each into the appropriate area of the skin as directed by provider.   • Fluticasone-Salmeterol (ADVAIR/WIXELA) 250-50 MCG/ACT DISKUS Inhale 1 puff 2 (Two) Times a Day. For asthma. Rinse mouth after use   • lamoTRIgine (LaMICtal) 100 MG tablet Take 1 tablet by mouth twice per day   • levalbuterol (XOPENEX HFA) 45 MCG/ACT inhaler Inhale 1-2 puffs Every 4 (Four) Hours As Needed for Wheezing or Shortness of Air.   • levalbuterol (Xopenex) 1.25 MG/3ML nebulizer solution Take 1 ampule by nebulization Every 4 (Four) Hours As Needed for Wheezing.   • losartan (Cozaar) 50 MG tablet Take 1 tablet by mouth Daily.   • lurasidone HCl (Latuda) 60 MG tablet tablet Take 1 tablet by mouth every night at bedtime.   • metoprolol succinate XL  "(TOPROL-XL) 50 MG 24 hr tablet Take 1 tablet by mouth Daily.   • montelukast (Singulair) 10 MG tablet Take 1 tablet by mouth Every Night.   • Retin-A 0.025 % cream apply daily after washing   • spironolactone (ALDACTONE) 25 MG tablet Take 1 tablet by mouth 3 (Three) Times a Day.   • traZODone (DESYREL) 50 MG tablet Take 0.5-1 tablets by mouth At Night As Needed for Sleep.     No current facility-administered medications on file prior to visit.       REVIEW OF SYSTEMS  Review of Systems   Constitutional: Negative for activity change, chills and fatigue.   Respiratory: Negative for cough, chest tightness and shortness of breath.    Cardiovascular: Positive for palpitations. Negative for chest pain and leg swelling.   Gastrointestinal: Negative for nausea and vomiting.   Skin: Negative for rash.   Neurological: Negative for dizziness, syncope and light-headedness.   Hematological: Does not bruise/bleed easily.        Objective   Vitals:    03/28/23 0946   BP: 121/79   Pulse: 83   Weight: 122 kg (270 lb)   Height: 167.6 cm (66\")         Physical Exam  General : Alert, awake, no acute distress  Neck : Supple, no carotid bruit, no jugular venous distention  CVS : Regular rate and rhythm, no murmur, rubs or gallops  Lungs: Clear to auscultation bilaterally, no crackles or rhonchi  Abdomen: Soft, nontender, bowel sounds active  Extremities: Warm, well-perfused, no pedal edema      Result Review     The following data was reviewed by CLEO Samuel     proBNP   Date Value Ref Range Status   03/07/2022 31.4 0.0 - 450.0 pg/mL Final     CMP    CMP 11/2/22 12/13/22 2/1/23   Glucose 97  114 (A)   BUN 15  23 (A)   Creatinine 0.83  1.10 (A)   eGFR 99.9  71.2   Sodium 139  139   Potassium 4.1  4.2   Chloride 105  105   Calcium 9.6  9.4   Total Protein 7.1 7.6    Albumin 4.20 4.70    Globulin 2.9     Total Bilirubin 0.3 0.3    Alkaline Phosphatase 81 89    AST (SGOT) 49 (A) 14    ALT (SGPT) 92 (A) 16    Albumin/Globulin Ratio " 1.4     BUN/Creatinine Ratio 18.1  20.9   Anion Gap 9.0  9.0   (A) Abnormal value       Comments are available for some flowsheets but are not being displayed.           CBC w/diff    CBC w/Diff 5/24/22 11/2/22   WBC 7.99 6.77   RBC 5.05 4.99   Hemoglobin 14.0 14.1   Hematocrit 43.9 43.7   MCV 86.9 87.6   MCH 27.7 28.3   MCHC 31.9 32.3   RDW 13.2 13.1   Platelets 302 293   Neutrophil Rel % 68.2 58.8   Immature Granulocyte Rel % 0.1 0.6 (A)   Lymphocyte Rel % 20.9 26.0   Monocyte Rel % 8.9 10.6   Eosinophil Rel % 1.5 3.0   Basophil Rel % 0.4 1.0   (A) Abnormal value             Lab Results   Component Value Date    TSH 1.910 05/24/2022      Lab Results   Component Value Date    FREET4 1.14 05/24/2022      No results found for: DDIMERQUANT  Magnesium   Date Value Ref Range Status   03/07/2022 2.2 1.6 - 2.6 mg/dL Final      No results found for: DIGOXIN   No results found for: TROPONINT        Lipid Panel    Lipid Panel 5/24/22   Total Cholesterol 157   Triglycerides 81   HDL Cholesterol 46   VLDL Cholesterol 15   LDL Cholesterol  96   LDL/HDL Ratio 2.06             Results for orders placed in visit on 02/23/23    Adult Transthoracic Echo Complete w/ Color, Spectral and Contrast if necessary per protocol    Interpretation Summary  Normal left ventricular systolic function with an estimated ejection fraction of 60-65%.  No regional wall motion abnormalities were observed.  Left ventricular diastolic function is consistent with impaired relaxation (grade I diastolic dysfunction).  No hemodynamically significant valvular pathology.  Right ventricular systolic pressure could not be accurately estimated due to an insufficient TR velocity profile.      Tilt Table 2/25/23    Interpretation Summary    Negative tilt table study with no significant vasodepressor or cardioinhibitory response observed and no symptoms reported during the study.  There was no evidence of postural orthostatic tachycardia syndrome (POTS).  However,  the patient is noted to be on beta blocker therapy with Toprol XL 50 mg daily, which may have affected these results.  Baseline orthostatics were unremarkable.         Assessment and Plan   Diagnoses and all orders for this visit:    1. Essential hypertension (Primary)  Assessment & Plan:  Blood pressure well controlled on current regiment, continue metoprolol, losartan, and amlodipine.   Tolerates amlodipine at 5 mg well, previously at higher dose and did have swelling.    Orders:  -     Basic Metabolic Panel; Future    2. Intermittent palpitations  Assessment & Plan:  Holter monitor showed frequent sinus tachycardia, without ectopy, she is doing well in terms of symptom management with metoprolol.  Encouraged her to limit caffeine intake, and work on weight loss.      3. Vasovagal syncope  Assessment & Plan:  No further episodes, tilt table test was unremarkable.      Other orders  -     metoprolol succinate XL (TOPROL-XL) 50 MG 24 hr tablet; Take 1/2 tablet in the morning and 1 tablet in the evening  Dispense: 135 tablet; Refill: 3              Follow Up   Return in about 6 months (around 9/28/2023) for with new MD d/t Dr Monroe spain.    Patient was given instructions and counseling regarding her condition or for health maintenance advice. Please see specific information pulled into the AVS if appropriate.     CLEO Samuel      Dictated Utilizing Dragon Dictation

## 2023-03-29 NOTE — TELEPHONE ENCOUNTER
The problem is is that we do not have any documentation to support her being sent to podiatry or discussing her heel spurs.  I am not sure what we can do.

## 2023-04-08 NOTE — ASSESSMENT & PLAN NOTE
Blood pressure well controlled on current regiment, continue metoprolol, losartan, and amlodipine.   Tolerates amlodipine at 5 mg well, previously at higher dose and did have swelling.  Counseled her if she attempted to conceive/became pregnant, we would need to reevaluate her blood pressure medication management for safety reasons.  Voiced good understanding.,  And is currently unreliable contraceptive with implanted Nexplanon.    Recent chemistry showed creatinine 1.1, will recheck BMP before her next follow-up visit.

## 2023-04-08 NOTE — ASSESSMENT & PLAN NOTE
Holter monitor showed frequent sinus tachycardia, without ectopy, she is doing well in terms of symptom management with metoprolol.  Encouraged her to limit caffeine intake, and work on weight loss.

## 2023-04-11 ENCOUNTER — OFFICE VISIT (OUTPATIENT)
Dept: PULMONOLOGY | Facility: CLINIC | Age: 28
End: 2023-04-11
Payer: COMMERCIAL

## 2023-04-11 VITALS
OXYGEN SATURATION: 96 % | HEIGHT: 66 IN | TEMPERATURE: 98 F | WEIGHT: 271.4 LBS | HEART RATE: 88 BPM | SYSTOLIC BLOOD PRESSURE: 131 MMHG | BODY MASS INDEX: 43.62 KG/M2 | RESPIRATION RATE: 16 BRPM | DIASTOLIC BLOOD PRESSURE: 84 MMHG

## 2023-04-11 DIAGNOSIS — J45.20 MILD INTERMITTENT ASTHMA WITHOUT COMPLICATION: ICD-10-CM

## 2023-04-11 DIAGNOSIS — E66.01 CLASS 3 SEVERE OBESITY DUE TO EXCESS CALORIES WITH SERIOUS COMORBIDITY AND BODY MASS INDEX (BMI) OF 40.0 TO 44.9 IN ADULT: Primary | ICD-10-CM

## 2023-04-11 PROCEDURE — 3079F DIAST BP 80-89 MM HG: CPT | Performed by: INTERNAL MEDICINE

## 2023-04-11 PROCEDURE — 1159F MED LIST DOCD IN RCRD: CPT | Performed by: INTERNAL MEDICINE

## 2023-04-11 PROCEDURE — 1160F RVW MEDS BY RX/DR IN RCRD: CPT | Performed by: INTERNAL MEDICINE

## 2023-04-11 PROCEDURE — 3075F SYST BP GE 130 - 139MM HG: CPT | Performed by: INTERNAL MEDICINE

## 2023-04-11 PROCEDURE — 99213 OFFICE O/P EST LOW 20 MIN: CPT | Performed by: INTERNAL MEDICINE

## 2023-04-11 NOTE — PROGRESS NOTES
Pulmonary Office Follow-up    Subjective     Zhane Traylor is seen today at the office for   Chief Complaint   Patient presents with   • Follow-up     3 month fu   • Asthma   • Cough   • Shortness of Breath         HPI  Zhane Traylor is a 27 y.o. female with a PMH significant for mild asthma and wheezing presents for follow-up patient has had a sleep study done patient shortness of breath and wheezing has almost totally resolved      Tobacco use history:  Former smoker      Patient Active Problem List   Diagnosis   • Alcoholism in remission   • Allergic rhinitis   • Anxiety disorder   • Asthma   • Borderline diabetes   • Major depression   • Class 3 severe obesity due to excess calories with serious comorbidity and body mass index (BMI) of 40.0 to 44.9 in adult   • Injury of left knee   • Left knee pain   • Left anterior cruciate ligament tear   • Closed fracture of left tibial plateau   • Tear of lateral meniscus of left knee   • Acute medial meniscus tear of left knee   • Sprain of medial collateral ligament of left knee   • Acute injury of left anterior cruciate ligament   • Aftercare following surgery of left knee arthroscopic ACL reconstruction with allograft, 2/21/2022   • MPFL tear   • Sprain of medial collateral ligament of right knee   • Essential hypertension   • Tachycardia   • Vitamin D deficiency   • Iron deficiency   • Mild intermittent asthma with exacerbation   • History of alcohol abuse   • Elevated LFTs   • Insomnia   • Intermittent palpitations   • Vasovagal syncope       Review of Systems  Review of Systems   All other systems reviewed and are negative.    As described in the HPI. Otherwise, remainder of ROS (14 systems) were negative.    Medications, Allergies, Social, and Family Histories reviewed as per EMR.    Objective     Vitals:    04/11/23 1550   BP: 131/84   Pulse: 88   Resp: 16   Temp: 98 °F (36.7 °C)   SpO2: 96%         04/11/23  1550   Weight: 123 kg (271 lb 6.4 oz)        Physical Exam  Vitals and nursing note reviewed.   Constitutional:       Appearance: She is obese.   HENT:      Head: Normocephalic and atraumatic.      Nose: Nose normal.      Mouth/Throat:      Mouth: Mucous membranes are moist.      Pharynx: Oropharynx is clear.   Eyes:      Extraocular Movements: Extraocular movements intact.      Conjunctiva/sclera: Conjunctivae normal.      Pupils: Pupils are equal, round, and reactive to light.   Cardiovascular:      Rate and Rhythm: Normal rate and regular rhythm.      Pulses: Normal pulses.      Heart sounds: Normal heart sounds.   Pulmonary:      Effort: Pulmonary effort is normal.      Breath sounds: Normal breath sounds.   Abdominal:      General: Abdomen is flat. Bowel sounds are normal.      Palpations: Abdomen is soft.   Musculoskeletal:         General: Normal range of motion.      Cervical back: Normal range of motion and neck supple.   Skin:     General: Skin is warm.      Capillary Refill: Capillary refill takes 2 to 3 seconds.   Neurological:      General: No focal deficit present.      Mental Status: She is alert and oriented to person, place, and time.   Psychiatric:         Mood and Affect: Mood normal.         Behavior: Behavior normal.         No radiology results for the last 90 days.     Assessment & Plan     Diagnoses and all orders for this visit:    1. Class 3 severe obesity due to excess calories with serious comorbidity and body mass index (BMI) of 40.0 to 44.9 in adult (Primary)    2. Mild intermittent asthma without complication         Discussion/ Recommendations:   Patient is advised to reduce weight her sleep study was reviewed which does not reveal any significant obstructive sleep apnea  Patient is advised to continue medications  Strict diet and regular exercise  Her BMI is 43.81  Vaccinations discussed and recommended    Class 3 Severe Obesity (BMI >=40). Obesity-related health conditions include the following: none. Obesity is  unchanged. BMI is is above average; BMI management plan is completed. We discussed low calorie, low carb based diet program, portion control and increasing exercise.        Return in about 4 months (around 8/11/2023).          This document has been electronically signed by Ramon Knowles MD on April 11, 2023 15:58 EDT

## 2023-04-18 DIAGNOSIS — R00.0 TACHYCARDIA: ICD-10-CM

## 2023-04-18 DIAGNOSIS — I10 PRIMARY HYPERTENSION: ICD-10-CM

## 2023-04-18 RX ORDER — AMLODIPINE BESYLATE 5 MG/1
5 TABLET ORAL DAILY
Qty: 30 TABLET | Refills: 1 | OUTPATIENT
Start: 2023-04-18

## 2023-04-20 ENCOUNTER — OFFICE VISIT (OUTPATIENT)
Dept: OBSTETRICS AND GYNECOLOGY | Facility: CLINIC | Age: 28
End: 2023-04-20
Payer: COMMERCIAL

## 2023-04-20 VITALS
WEIGHT: 264 LBS | SYSTOLIC BLOOD PRESSURE: 104 MMHG | HEIGHT: 66 IN | HEART RATE: 94 BPM | DIASTOLIC BLOOD PRESSURE: 66 MMHG | BODY MASS INDEX: 42.43 KG/M2

## 2023-04-20 DIAGNOSIS — Z80.3 FAMILY HISTORY OF BREAST CANCER: Primary | ICD-10-CM

## 2023-04-20 NOTE — PROGRESS NOTES
"[facility] FU      CC: Follow up genetic testing    [facility] Hereditary Cancer Screen (01/27/2023 17:09)     HPI: Negative, [facility] Lifetime Breast Cancer risk 12.8%, [facility] Lifetime Breast Cancer risk 19.6%    PHYSICAL EXAM:  /66   Pulse 94   Ht 167.6 cm (66\")   Wt 120 kg (264 lb)   BMI 42.61 kg/m²   General- NAD, alert and oriented, appropriate  Psych- Normal mood, good memory    ASSESSMENT and PLAN:    Diagnoses and all orders for this visit:    1. Family history of breast cancer (Primary)    Counseling: Refer tp [facility] report.  Copy of results and Plan provided. Results and any recommendations for FU and/or early detection and/or prevention reviewed. Patient concerns were discussed and answered. If VUS present reviewed importance of keeping active POC if status changes.    Follow Up:  Return for as needed.    I spent 15 minutes caring for Zhane on this date of service. This time includes time spent by me in the following activities:reviewing tests, counseling and educating the patient/family/caregiver and documenting information in the medical record    Rupal Roberts, APRN  04/20/2023    "

## 2023-04-24 NOTE — PROGRESS NOTES
Chief Complaint  Bronchitis (Chronic bronchitis ), Cough, and Nasal Congestion    Subjective          Zhane Traylor, 27 y.o. female presents to [facility]  History of Present Illness   Patient today for a 3-month follow-up on prediabetes, hypertension and asthma.    She is sick today with cough and congestion for the past 4-5 days.  One of her coworkers did test positive for COVID last week.  She took a home COVID test last night and it was negative.  We are retested her today for COVID and was negative.    She has Xopenex inhaler and nebulizer to use as needed for asthma but does need a refill.  She also is on Singulair 10 mg every evening and Advair inhaler for prevention.    Hypertension: Her blood pressure has improved.  She is on losartan 50 mg daily, amlodipine 5 mg, and metoprolol XL 1/2 tablet in the morning and 1 tablet in the evening for hypertension.    Prediabetes: Her last A1c was stable at 5.7% in November 2022.    She has had low vitamin D, last vitamin D level was 28.6 in November 2022.  She is currently taking vitamin D OTC, unknown dose.     She has had anemia, her last iron panel 5 months ago was stable.    I had started her on trazodone to take as needed due to some insomnia.  She is following with psychiatry for anxiety.  She is currently on diazepam.    Tobacco Use: Medium Risk   • Smoking Tobacco Use: Former   • Smokeless Tobacco Use: Never   • Passive Exposure: Past      Objective   Vital Signs:   /86 (BP Location: Right arm, Patient Position: Sitting, Cuff Size: Adult)   Pulse 86   Temp 97 °F (36.1 °C)   SpO2 99%       Current Outpatient Medications:   •  amLODIPine (NORVASC) 5 MG tablet, Take 1 tablet by mouth Daily., Disp: 90 tablet, Rfl: 1  •  cetirizine (zyrTEC) 10 MG tablet, Zyrtec 10 mg oral tablet take 1 tablet (10 mg) by oral route once daily   Active, Disp: , Rfl:   •  diazePAM (VALIUM) 2 MG tablet, take 1 tablet by mouth twice daily,  Disp: 60 tablet, Rfl: 0  •  Ergocalciferol (VITAMIN D2 PO), Take 1 capsule by mouth Daily., Disp: , Rfl:   •  Etonogestrel (Nexplanon) 68 MG implant subdermal implant, Inject 1 each into the appropriate area of the skin as directed by provider., Disp: , Rfl:   •  Fluticasone-Salmeterol (ADVAIR/WIXELA) 250-50 MCG/ACT DISKUS, Inhale 1 puff 2 (Two) Times a Day. For asthma. Rinse mouth after use, Disp: 60 each, Rfl: 3  •  lamoTRIgine (LaMICtal) 100 MG tablet, Take 1 tablet by mouth twice per day, Disp: 60 tablet, Rfl: 3  •  levalbuterol (XOPENEX HFA) 45 MCG/ACT inhaler, Inhale 1-2 puffs Every 4 (Four) Hours As Needed for Wheezing or Shortness of Air., Disp: 15 g, Rfl: 5  •  levalbuterol (Xopenex) 1.25 MG/3ML nebulizer solution, Take 1 ampule by nebulization Every 4 (Four) Hours As Needed for Wheezing or Shortness of Air., Disp: 90 mL, Rfl: 5  •  losartan (Cozaar) 50 MG tablet, Take 1 tablet by mouth Daily., Disp: 90 tablet, Rfl: 3  •  lurasidone HCl (Latuda) 60 MG tablet tablet, Take 1 tablet by mouth every night at bedtime., Disp: 30 tablet, Rfl: 0  •  metoprolol succinate XL (TOPROL-XL) 50 MG 24 hr tablet, Take 1/2 tablet in the morning and 1 tablet in the evening (Patient taking differently: 1.5 tablets. Take 1/2 tablet in the morning and 1 tablet in the evening), Disp: 135 tablet, Rfl: 3  •  montelukast (Singulair) 10 MG tablet, Take 1 tablet by mouth Every Night., Disp: 30 tablet, Rfl: 5  •  Retin-A 0.025 % cream, apply daily after washing, Disp: 45 g, Rfl: 2  •  spironolactone (ALDACTONE) 25 MG tablet, Take 1 tablet by mouth 3 (Three) Times a Day., Disp: 90 tablet, Rfl: 5  •  traZODone (DESYREL) 50 MG tablet, Take 0.5-1 tablets by mouth At Night As Needed for Sleep., Disp: 30 tablet, Rfl: 0  •  benzonatate (TESSALON) 200 MG capsule, Take 1 capsule by mouth 3 (Three) Times a Day As Needed for Cough for up to 7 days., Disp: 21 capsule, Rfl: 0  •  predniSONE (DELTASONE) 20 MG tablet, Take 3 tablets by mouth Daily for  2 days, THEN 2 tablets Daily for 2 days, THEN 1 tablet Daily for 2 days., Disp: 12 tablet, Rfl: 0   Past Medical History:   Diagnosis Date   • Alcoholism in remission 02/05/2021   • Anxiety disorder 01/05/2021   • Asthma    • Constipation    • Encounter for blood transfusion    • H/O psychiatric care    • Hypertension    • Left anterior cruciate ligament tear    • Major depression 02/05/2021   • Obesity    • Onychomycosis    • PONV (postoperative nausea and vomiting)       Physical Exam  Vitals reviewed.   Constitutional:       Appearance: Normal appearance. She is well-developed.   HENT:      Right Ear: Ear canal and external ear normal. A middle ear effusion is present.      Left Ear: Ear canal and external ear normal. A middle ear effusion is present.      Nose:      Right Sinus: Frontal sinus tenderness present. No maxillary sinus tenderness.      Left Sinus: Frontal sinus tenderness present. No maxillary sinus tenderness.      Mouth/Throat:      Mouth: Mucous membranes are moist.      Pharynx: No pharyngeal swelling, oropharyngeal exudate or posterior oropharyngeal erythema.      Comments: Postnasal drainage noted.    Neck:      Thyroid: No thyroid mass, thyromegaly or thyroid tenderness.   Cardiovascular:      Rate and Rhythm: Normal rate and regular rhythm.      Heart sounds: No murmur heard.    No friction rub. No gallop.   Pulmonary:      Effort: Pulmonary effort is normal.      Breath sounds: Normal breath sounds. No wheezing or rhonchi.   Lymphadenopathy:      Cervical: No cervical adenopathy.   Skin:     General: Skin is warm and dry.   Neurological:      Mental Status: She is alert and oriented to person, place, and time.      Cranial Nerves: No cranial nerve deficit.   Psychiatric:         Mood and Affect: Mood and affect normal.         Behavior: Behavior normal.         Thought Content: Thought content normal. Thought content does not include homicidal or suicidal ideation.         Judgment: Judgment  normal.        Result Review :   {The following data was reviewed by CLEO Garza    CMP   CMP        11/2/2022    12:14 12/13/2022    08:04 2/1/2023    18:07   CMP   Glucose 97    114     BUN 15    23     Creatinine 0.83    1.10     EGFR 99.9    71.2     Sodium 139    139     Potassium 4.1    4.2     Chloride 105    105     Calcium 9.6    9.4     Total Protein 7.1   7.6      Albumin 4.20   4.70      Globulin 2.9       Total Bilirubin 0.3   0.3      Alkaline Phosphatase 81   89      AST (SGOT) 49   14      ALT (SGPT) 92   16      Albumin/Globulin Ratio 1.4       BUN/Creatinine Ratio 18.1    20.9     Anion Gap 9.0    9.0       CBC   CBC        5/24/2022    08:30 11/2/2022    12:14   CBC   WBC 7.99   6.77     RBC 5.05   4.99     Hemoglobin 14.0   14.1     Hematocrit 43.9   43.7     MCV 86.9   87.6     MCH 27.7   28.3     MCHC 31.9   32.3     RDW 13.2   13.1     Platelets 302   293       LIPID   Lipid Panel        5/24/2022    08:30   Lipid Panel   Total Cholesterol 157     Triglycerides 81     HDL Cholesterol 46     VLDL Cholesterol 15     LDL Cholesterol  96     LDL/HDL Ratio 2.06       TSH   TSH        5/24/2022    08:30   TSH   TSH 1.910       [id number]   A1C Last 3 Results        5/24/2022    08:30 11/2/2022    12:14   HGBA1C Last 3 Results   Hemoglobin A1C 5.30   5.70       VITD   Lab Results   Component Value Date    NVZZ32MR 28.6 (L) 11/02/2022     FREET4   Lab Results   Component Value Date    FREET4 1.14 05/24/2022     VITB12   Lab Results   Component Value Date    IRFQOFAU36 401 11/02/2022     IRON    Iron   Date Value Ref Range Status   11/02/2022 70 37 - 145 mcg/dL Final     Iron Saturation   Date Value Ref Range Status   11/02/2022 15 (L) 20 - 50 % Final     Transferrin   Date Value Ref Range Status   11/02/2022 309 200 - 360 mg/dL Final     TIBC   Date Value Ref Range Status   11/02/2022 460 298 - 536 mcg/dL Final     Lab Results (last 24 hours)     Procedure Component Value Units Date/Time     POCT SARS-CoV-2 Antigen MELLISSA + Flu [[id number]]  (Normal) Collected: 04/25/23 0737    Specimen: Swab Updated: 04/25/23 0738     SARS Antigen Not Detected     Influenza A Antigen MELLISSA Not Detected     Influenza B Antigen MELLISSA Not Detected     Internal Control Passed     Lot Number [id number]     Expiration Date 3/15/24    Vitamin D,25-Hydroxy [[id number]] Collected: 04/25/23 0752    Specimen: Blood Updated: 04/25/23 0752    Hemoglobin A1c [[id number]] Collected: 04/25/23 0752    Specimen: Blood Updated: 04/25/23 0752    Comprehensive Metabolic Panel [[id number]] Collected: 04/25/23 0752    Specimen: Blood Updated: 04/25/23 0752    Lipid Panel [[id number]] Collected: 04/25/23 0752    Specimen: Blood Updated: 04/25/23 0752    TSH+Free T4 [[id number]] Collected: 04/25/23 0752    Specimen: Blood Updated: 04/25/23 0752    CBC Auto Differential [[id number]] Collected: 04/25/23 0752    Specimen: Blood Updated: 04/25/23 0752    Iron Profile [[id number]] Collected: 04/25/23 0752    Specimen: Blood Updated: 04/25/23 0752    Ferritin [[id number]] Collected: 04/25/23 0752    Specimen: Blood Updated: 04/25/23 0752    Vitamin B12 & Folate [[id number]] Collected: 04/25/23 0752    Specimen: Blood from Arm, Right Updated: 04/25/23 0752               Assessment and Plan    Diagnoses and all orders for this visit:    1. Moderate persistent asthma with acute exacerbation (Primary)  Assessment & Plan:  Asthma is worsening.  The patient is experiencing frequent daytime asthma symptoms. She is experiencing frequent nighttime asthma symptoms.  Asthma information handout given.  Discussed monitoring symptoms and use of quick-relief medications and contacting us early in the course of exacerbations.  Medications: continue Xopenex nebulizers every 4-6 hours while sick and then as needed, continue Singulair 10 mg daily, and begin Prednisone Dosepak.  Follow up in 3 months, or sooner should new symptoms or problems arise.        Orders:  -     predniSONE  (DELTASONE) 20 MG tablet; Take 3 tablets by mouth Daily for 2 days, THEN 2 tablets Daily for 2 days, THEN 1 tablet Daily for 2 days.  Dispense: 12 tablet; Refill: 0  -     levalbuterol (Xopenex) 1.25 MG/3ML nebulizer solution; Take 1 ampule by nebulization Every 4 (Four) Hours As Needed for Wheezing or Shortness of Air.  Dispense: 90 mL; Refill: 5    2. Primary hypertension  Assessment & Plan:  Hypertension is improving with treatment.  Continue current treatment regimen.  Continue current medications.  Blood pressure will be reassessed in 3 months.    Orders:  -     amLODIPine (NORVASC) 5 MG tablet; Take 1 tablet by mouth Daily.  Dispense: 90 tablet; Refill: 1  -     Comprehensive Metabolic Panel  -     Lipid Panel  -     TSH+Free T4    3. Tachycardia  Assessment & Plan:  Heart rate has improved on medications, patient will continue current doses.    Orders:  -     amLODIPine (NORVASC) 5 MG tablet; Take 1 tablet by mouth Daily.  Dispense: 90 tablet; Refill: 1    4. Acute cough  -     POCT SARS-CoV-2 Antigen MELLISSA + Flu  -     benzonatate (TESSALON) 200 MG capsule; Take 1 capsule by mouth 3 (Three) Times a Day As Needed for Cough for up to 7 days.  Dispense: 21 capsule; Refill: 0  -     predniSONE (DELTASONE) 20 MG tablet; Take 3 tablets by mouth Daily for 2 days, THEN 2 tablets Daily for 2 days, THEN 1 tablet Daily for 2 days.  Dispense: 12 tablet; Refill: 0    5. Borderline diabetes  Assessment & Plan:  We will recheck her A1c today.  Advised to follow a diet low in carbs and sugars, handout provided, see AVS    Orders:  -     Hemoglobin A1c  -     TSH+Free T4    6. Vitamin D deficiency  Assessment & Plan:  She is currently taking over-the-counter vitamin D.  We will recheck her vitamin D level today with her labs.    Orders:  -     Vitamin D,25-Hydroxy    7. Iron deficiency  Assessment & Plan:  We will recheck iron levels today with her labs.    Orders:  -     CBC Auto Differential  -     Iron Profile  -      Ferritin  -     Vitamin B12 & Folate    8. Acute frontal sinusitis, recurrence not specified  -     predniSONE (DELTASONE) 20 MG tablet; Take 3 tablets by mouth Daily for 2 days, THEN 2 tablets Daily for 2 days, THEN 1 tablet Daily for 2 days.  Dispense: 12 tablet; Refill: 0  Discussed with patient upper respiratory infection is viral and antibiotics are not warranted.  Discussed symptom management, take Tylenol or ibuprofen as needed, drink lots of fluids and rest.  Instructed to call back if symptoms do not improve or worsen by day 10.  Discussed possibility of false negative results.  Advised since she has had a positive exposure to COVID and has symptoms, to wear a mask when she goes anywhere.  Advised good handwashing to help prevent spread of infection.    Follow Up   Return in about 3 months (around 7/25/2023) for Next scheduled follow up.  Patient was given instructions and counseling regarding her condition or for health maintenance advice. Please see specific information pulled into the AVS if appropriate.     Parts of this note are electronic transcriptions/translations of spoken language to printed text using the Dragon Dictation system.      Sonya Squires, APRN  04/25/2023

## 2023-04-25 ENCOUNTER — OFFICE VISIT (OUTPATIENT)
Dept: FAMILY MEDICINE CLINIC | Facility: CLINIC | Age: 28
End: 2023-04-25
Payer: COMMERCIAL

## 2023-04-25 VITALS
TEMPERATURE: 97 F | HEART RATE: 86 BPM | DIASTOLIC BLOOD PRESSURE: 86 MMHG | SYSTOLIC BLOOD PRESSURE: 122 MMHG | OXYGEN SATURATION: 99 %

## 2023-04-25 DIAGNOSIS — R05.1 ACUTE COUGH: ICD-10-CM

## 2023-04-25 DIAGNOSIS — R00.0 TACHYCARDIA: ICD-10-CM

## 2023-04-25 DIAGNOSIS — E55.9 VITAMIN D DEFICIENCY: ICD-10-CM

## 2023-04-25 DIAGNOSIS — E61.1 IRON DEFICIENCY: ICD-10-CM

## 2023-04-25 DIAGNOSIS — J45.41 MODERATE PERSISTENT ASTHMA WITH ACUTE EXACERBATION: Primary | ICD-10-CM

## 2023-04-25 DIAGNOSIS — E53.8 FOLATE DEFICIENCY: ICD-10-CM

## 2023-04-25 DIAGNOSIS — R73.03 BORDERLINE DIABETES: ICD-10-CM

## 2023-04-25 DIAGNOSIS — I10 PRIMARY HYPERTENSION: ICD-10-CM

## 2023-04-25 DIAGNOSIS — E53.8 VITAMIN B12 DEFICIENCY: ICD-10-CM

## 2023-04-25 DIAGNOSIS — J01.10 ACUTE FRONTAL SINUSITIS, RECURRENCE NOT SPECIFIED: ICD-10-CM

## 2023-04-25 LAB
25(OH)D3 SERPL-MCNC: 24.8 NG/ML (ref 30–100)
ALBUMIN SERPL-MCNC: 4 G/DL (ref 3.5–5.2)
ALBUMIN/GLOB SERPL: 1.4 G/DL
ALP SERPL-CCNC: 69 U/L (ref 39–117)
ALT SERPL W P-5'-P-CCNC: 15 U/L (ref 1–33)
ANION GAP SERPL CALCULATED.3IONS-SCNC: 8.5 MMOL/L (ref 5–15)
AST SERPL-CCNC: 11 U/L (ref 1–32)
BASOPHILS # BLD AUTO: 0.06 10*3/MM3 (ref 0–0.2)
BASOPHILS NFR BLD AUTO: 0.6 % (ref 0–1.5)
BILIRUB SERPL-MCNC: <0.2 MG/DL (ref 0–1.2)
BUN SERPL-MCNC: 15 MG/DL (ref 6–20)
BUN/CREAT SERPL: 14.9 (ref 7–25)
CALCIUM SPEC-SCNC: 9.9 MG/DL (ref 8.6–10.5)
CHLORIDE SERPL-SCNC: 109 MMOL/L (ref 98–107)
CHOLEST SERPL-MCNC: 159 MG/DL (ref 0–200)
CO2 SERPL-SCNC: 22.5 MMOL/L (ref 22–29)
CREAT SERPL-MCNC: 1.01 MG/DL (ref 0.57–1)
DEPRECATED RDW RBC AUTO: 38.3 FL (ref 37–54)
EGFRCR SERPLBLD CKD-EPI 2021: 78.9 ML/MIN/1.73
EOSINOPHIL # BLD AUTO: 0.36 10*3/MM3 (ref 0–0.4)
EOSINOPHIL NFR BLD AUTO: 3.3 % (ref 0.3–6.2)
ERYTHROCYTE [DISTWIDTH] IN BLOOD BY AUTOMATED COUNT: 12.2 % (ref 12.3–15.4)
EXPIRATION DATE: NORMAL
FERRITIN SERPL-MCNC: 79.4 NG/ML (ref 13–150)
FLUAV AG UPPER RESP QL IA.RAPID: NOT DETECTED
FLUBV AG UPPER RESP QL IA.RAPID: NOT DETECTED
FOLATE SERPL-MCNC: 4.66 NG/ML (ref 4.78–24.2)
GLOBULIN UR ELPH-MCNC: 2.9 GM/DL
GLUCOSE SERPL-MCNC: 89 MG/DL (ref 65–99)
HBA1C MFR BLD: 5.6 % (ref 4.8–5.6)
HCT VFR BLD AUTO: 41 % (ref 34–46.6)
HDLC SERPL-MCNC: 39 MG/DL (ref 40–60)
HGB BLD-MCNC: 13.6 G/DL (ref 12–15.9)
IMM GRANULOCYTES # BLD AUTO: 0.04 10*3/MM3 (ref 0–0.05)
IMM GRANULOCYTES NFR BLD AUTO: 0.4 % (ref 0–0.5)
INTERNAL CONTROL: NORMAL
IRON 24H UR-MRATE: 52 MCG/DL (ref 37–145)
IRON SATN MFR SERPL: 12 % (ref 20–50)
LDLC SERPL CALC-MCNC: 104 MG/DL (ref 0–100)
LDLC/HDLC SERPL: 2.63 {RATIO}
LYMPHOCYTES # BLD AUTO: 2.39 10*3/MM3 (ref 0.7–3.1)
LYMPHOCYTES NFR BLD AUTO: 22 % (ref 19.6–45.3)
Lab: NORMAL
MCH RBC QN AUTO: 28.7 PG (ref 26.6–33)
MCHC RBC AUTO-ENTMCNC: 33.2 G/DL (ref 31.5–35.7)
MCV RBC AUTO: 86.5 FL (ref 79–97)
MONOCYTES # BLD AUTO: 0.83 10*3/MM3 (ref 0.1–0.9)
MONOCYTES NFR BLD AUTO: 7.6 % (ref 5–12)
NEUTROPHILS NFR BLD AUTO: 66.1 % (ref 42.7–76)
NEUTROPHILS NFR BLD AUTO: 7.2 10*3/MM3 (ref 1.7–7)
NRBC BLD AUTO-RTO: 0 /100 WBC (ref 0–0.2)
PLATELET # BLD AUTO: 280 10*3/MM3 (ref 140–450)
PMV BLD AUTO: 9.9 FL (ref 6–12)
POTASSIUM SERPL-SCNC: 3.9 MMOL/L (ref 3.5–5.2)
PROT SERPL-MCNC: 6.9 G/DL (ref 6–8.5)
RBC # BLD AUTO: 4.74 10*6/MM3 (ref 3.77–5.28)
SARS-COV-2 AG UPPER RESP QL IA.RAPID: NOT DETECTED
SODIUM SERPL-SCNC: 140 MMOL/L (ref 136–145)
T4 FREE SERPL-MCNC: 1.23 NG/DL (ref 0.93–1.7)
TIBC SERPL-MCNC: 422 MCG/DL (ref 298–536)
TRANSFERRIN SERPL-MCNC: 283 MG/DL (ref 200–360)
TRIGL SERPL-MCNC: 87 MG/DL (ref 0–150)
TSH SERPL DL<=0.05 MIU/L-ACNC: 2.81 UIU/ML (ref 0.27–4.2)
VIT B12 BLD-MCNC: 268 PG/ML (ref 211–946)
VLDLC SERPL-MCNC: 16 MG/DL (ref 5–40)
WBC NRBC COR # BLD: 10.88 10*3/MM3 (ref 3.4–10.8)

## 2023-04-25 PROCEDURE — 84466 ASSAY OF TRANSFERRIN: CPT | Performed by: NURSE PRACTITIONER

## 2023-04-25 PROCEDURE — 80061 LIPID PANEL: CPT | Performed by: NURSE PRACTITIONER

## 2023-04-25 PROCEDURE — 82746 ASSAY OF FOLIC ACID SERUM: CPT | Performed by: NURSE PRACTITIONER

## 2023-04-25 PROCEDURE — 84439 ASSAY OF FREE THYROXINE: CPT | Performed by: NURSE PRACTITIONER

## 2023-04-25 PROCEDURE — 84443 ASSAY THYROID STIM HORMONE: CPT | Performed by: NURSE PRACTITIONER

## 2023-04-25 PROCEDURE — 82607 VITAMIN B-12: CPT | Performed by: NURSE PRACTITIONER

## 2023-04-25 PROCEDURE — 80053 COMPREHEN METABOLIC PANEL: CPT | Performed by: NURSE PRACTITIONER

## 2023-04-25 PROCEDURE — 83036 HEMOGLOBIN GLYCOSYLATED A1C: CPT | Performed by: NURSE PRACTITIONER

## 2023-04-25 PROCEDURE — 82306 VITAMIN D 25 HYDROXY: CPT | Performed by: NURSE PRACTITIONER

## 2023-04-25 PROCEDURE — 85025 COMPLETE CBC W/AUTO DIFF WBC: CPT | Performed by: NURSE PRACTITIONER

## 2023-04-25 PROCEDURE — 82728 ASSAY OF FERRITIN: CPT | Performed by: NURSE PRACTITIONER

## 2023-04-25 PROCEDURE — 83540 ASSAY OF IRON: CPT | Performed by: NURSE PRACTITIONER

## 2023-04-25 RX ORDER — LEVALBUTEROL INHALATION SOLUTION 1.25 MG/3ML
1 SOLUTION RESPIRATORY (INHALATION) EVERY 4 HOURS PRN
Qty: 90 ML | Refills: 5 | Status: SHIPPED | OUTPATIENT
Start: 2023-04-25

## 2023-04-25 RX ORDER — BENZONATATE 200 MG/1
200 CAPSULE ORAL 3 TIMES DAILY PRN
Qty: 21 CAPSULE | Refills: 0 | Status: SHIPPED | OUTPATIENT
Start: 2023-04-25 | End: 2023-05-02

## 2023-04-25 RX ORDER — PREDNISONE 20 MG/1
TABLET ORAL
Qty: 12 TABLET | Refills: 0 | Status: SHIPPED | OUTPATIENT
Start: 2023-04-25 | End: 2023-05-01

## 2023-04-25 RX ORDER — AMLODIPINE BESYLATE 5 MG/1
5 TABLET ORAL DAILY
Qty: 90 TABLET | Refills: 1 | Status: SHIPPED | OUTPATIENT
Start: 2023-04-25

## 2023-04-25 NOTE — ASSESSMENT & PLAN NOTE
Asthma is worsening.  The patient is experiencing frequent daytime asthma symptoms. She is experiencing frequent nighttime asthma symptoms.  Asthma information handout given.  Discussed monitoring symptoms and use of quick-relief medications and contacting us early in the course of exacerbations.  Medications: continue Xopenex nebulizers every 4-6 hours while sick and then as needed, continue Singulair 10 mg daily, and begin Prednisone Dosepak.  Follow up in 3 months, or sooner should new symptoms or problems arise.

## 2023-04-25 NOTE — ASSESSMENT & PLAN NOTE
She is currently taking over-the-counter vitamin D.  We will recheck her vitamin D level today with her labs.

## 2023-04-25 NOTE — ASSESSMENT & PLAN NOTE
We will recheck her A1c today.  Advised to follow a diet low in carbs and sugars, handout provided, see AVS

## 2023-04-26 RX ORDER — FOLIC ACID 1 MG/1
1 TABLET ORAL DAILY
Qty: 90 TABLET | Refills: 3 | Status: SHIPPED | OUTPATIENT
Start: 2023-04-26

## 2023-04-26 RX ORDER — LANOLIN ALCOHOL/MO/W.PET/CERES
1000 CREAM (GRAM) TOPICAL DAILY
Qty: 90 TABLET | Refills: 3 | Status: SHIPPED | OUTPATIENT
Start: 2023-04-26

## 2023-06-09 ENCOUNTER — TELEPHONE (OUTPATIENT)
Dept: FAMILY MEDICINE CLINIC | Facility: CLINIC | Age: 28
End: 2023-06-09
Payer: COMMERCIAL

## 2023-06-09 DIAGNOSIS — M79.672 BILATERAL FOOT PAIN: Primary | ICD-10-CM

## 2023-06-09 DIAGNOSIS — M79.671 BILATERAL FOOT PAIN: Primary | ICD-10-CM

## 2023-06-09 DIAGNOSIS — M77.50 BONE SPUR OF FOOT: ICD-10-CM

## 2023-06-09 NOTE — TELEPHONE ENCOUNTER
Pt works at [facility] where she would like to be referred and will be able to make her own apt after referral is placed. She states Dr would like referral for both feet but left is the foot that bothers her the most.

## 2023-06-09 NOTE — TELEPHONE ENCOUNTER
Order placed, will need to get referral done for passport approval.  Patient will make her own appointment.

## 2023-06-09 NOTE — TELEPHONE ENCOUNTER
Patient called and said that since she has [employer] insurance now she needs a referral to see [facility] - she was dx with a bone spur a while ago and she said it has flared up - [facility] won't treat her until she gets a referral from [facility].    Can you put in a referral for this or do you need to see her first?

## 2023-06-09 NOTE — TELEPHONE ENCOUNTER
Need to know which foot she has the bone spur and I can put in the referral without her being seen.

## 2023-07-05 ENCOUNTER — TELEPHONE (OUTPATIENT)
Dept: CARDIOLOGY | Facility: CLINIC | Age: 28
End: 2023-07-05

## 2023-07-05 NOTE — TELEPHONE ENCOUNTER
Caller: TraylorZhane    Relationship: Self    Best call back number: [phone]    What medications are you currently taking:   Current Outpatient Medications on File Prior to Visit   Medication Sig Dispense Refill    amLODIPine (NORVASC) 5 MG tablet Take 1 tablet by mouth Daily. 90 tablet 1    azithromycin (Zithromax Z-Bin) 250 MG tablet Take 2 Tablets By Mouth On Day 1,  Then Take 1 Tablet By Mouth Daily for Days 2 Through 5. 6 tablet 0    cetirizine (zyrTEC) 10 MG tablet Zyrtec 10 mg oral tablet take 1 tablet (10 mg) by oral route once daily   Active      diazePAM (VALIUM) 2 MG tablet Take 1 tablet by mouth 2 (Two) Times a Day. 60 tablet 0    Ergocalciferol (VITAMIN D2 PO) Take 1 capsule by mouth Daily.      Etonogestrel (Nexplanon) 68 MG implant subdermal implant Inject 1 each into the appropriate area of the skin as directed by provider.      Fluticasone-Salmeterol (ADVAIR/WIXELA) 250-50 MCG/ACT DISKUS Inhale 1 puff 2 (Two) Times a Day. For asthma. Rinse mouth after use 60 each 3    folic acid (FOLVITE) 1 MG tablet Take 1 tablet by mouth Daily. 90 tablet 3    gabapentin (NEURONTIN) 300 MG capsule 1 capsule by mouth three times a day 90 capsule 0    lamoTRIgine (LaMICtal) 100 MG tablet Take 1 tablet by mouth twice per day 60 tablet 3    levalbuterol (XOPENEX HFA) 45 MCG/ACT inhaler Inhale 1-2 puffs Every 4 (Four) Hours As Needed for Wheezing or Shortness of Air. 15 g 5    levalbuterol (Xopenex) 1.25 MG/3ML nebulizer solution Take 1 ampule by nebulization Every 4 (Four) Hours As Needed for Wheezing or Shortness of Air. 90 mL 5    losartan (Cozaar) 50 MG tablet Take 1 tablet by mouth Daily. 90 tablet 3    lurasidone HCl (Latuda) 60 MG tablet tablet Take 1 tablet by mouth every night at bedtime. 30 tablet 0    methylPREDNISolone (Medrol) 4 MG dose pack Take as directed on package 21 tablet 0    metoprolol succinate XL (TOPROL-XL) 50 MG 24 hr tablet Take 1/2 tablet in the morning and 1 tablet in the evening  (Patient taking differently: 1.5 tablets. Take 1/2 tablet in the morning and 1 tablet in the evening) 135 tablet 3    montelukast (Singulair) 10 MG tablet Take 1 tablet by mouth Every Night. 30 tablet 5    Retin-A 0.025 % cream apply daily after washing 45 g 2    spironolactone (ALDACTONE) 25 MG tablet Take 1 tablet by mouth 3 (Three) Times a Day. 90 tablet 5    traZODone (DESYREL) 50 MG tablet Take 0.5-1 tablets by mouth At Night As Needed for Sleep. 30 tablet 0    vitamin B-12 (CYANOCOBALAMIN) 1000 MCG tablet Take 1 tablet by mouth Daily. 90 tablet 3    vitamin D3 (Vitamin D) 125 MCG (5000 UT) capsule capsule Take 1 capsule by mouth Daily. 90 capsule 3     No current facility-administered medications on file prior to visit.          When did you start taking these medications: ABOUT 6 MONTHS AGO    Which medication are you concerned about: BETA BLOCKER    Who prescribed you this medication: DR.PRICE    What are your concerns: PATIENT HAS BEEN LOSING NOTICEABLE CHUNKS OF HAIR. SHE HAS CONTACTED HER PCP ALREADY. SHE IS CONCERNED THAT PERHAPS THE BETA BLOCKER THAT SHE IS TAKING MAY CAUSE HAIR LOSS.    How long have you had these concerns: AT LEAST A COUPLE OF MONTHS

## 2023-07-06 NOTE — TELEPHONE ENCOUNTER
Hair loss can be associated with beta-blocker medication such as metoprolol.  I would recommend she stop her current dose of metoprolol.  Generally this will resolve, however it may take some time for her hair to grow back out.  If after being off this medications she starts having palpitations again, have her contact the office.  Likely next step would be to change amlodipine to diltiazem, but would recommend trialing how she does without metoprolol.  Also recommend monitoring blood pressure and may trend up after stopping metoprolol.

## 2023-07-14 ENCOUNTER — TELEPHONE (OUTPATIENT)
Dept: ORTHOPEDIC SURGERY | Facility: CLINIC | Age: 28
End: 2023-07-14

## 2023-07-14 NOTE — TELEPHONE ENCOUNTER
HUB AGENT ATTEMPTED NON-CLINICAL WARM TRANSFER - NO ANSWER     PLEASE CALL / LEAVE VMAIL w/PATIENT [phone] TO DISCUSS RESCHEDULING TODAY's 07-14-23 FOLLOW UP APPT @ 0745 w/DR BACA FOR FOLLOW UP FOR THE LEFT KNEE. IT IS SWOLLEN PAIN IN THAT AREA. CANT WALK ON IT.     PATIENT STATED SHE MISSED APPT DUE TO BEING OUT OF TOWN TODAY / NOT GETTING BACK FROM VACATION IN TIME - PATIENT WOULD BE AVAILABLE THIS COMING MON 07-17-23 ONWARD     PATIENT STATED SHE COULDN'T CALL TILL AFTER 0800 SINCE THE OFFICE WASN'T OPEN YET - PATIENT INFORMED [facility] OFC DOESN'T USUALLY OPEN TILL 0800, THE HUB IS OPEN 0388 - 1770 MON - FRI TO TO TAKE CALLS     THANKS

## 2023-07-25 ENCOUNTER — OFFICE VISIT (OUTPATIENT)
Dept: FAMILY MEDICINE CLINIC | Facility: CLINIC | Age: 28
End: 2023-07-25
Payer: COMMERCIAL

## 2023-07-25 VITALS
BODY MASS INDEX: 44.26 KG/M2 | WEIGHT: 275.4 LBS | OXYGEN SATURATION: 99 % | SYSTOLIC BLOOD PRESSURE: 138 MMHG | TEMPERATURE: 96.6 F | HEIGHT: 66 IN | DIASTOLIC BLOOD PRESSURE: 72 MMHG | HEART RATE: 84 BPM

## 2023-07-25 DIAGNOSIS — E61.1 IRON DEFICIENCY: ICD-10-CM

## 2023-07-25 DIAGNOSIS — E55.9 VITAMIN D DEFICIENCY: ICD-10-CM

## 2023-07-25 DIAGNOSIS — R10.11 RUQ PAIN: ICD-10-CM

## 2023-07-25 DIAGNOSIS — R73.03 PREDIABETES: Primary | ICD-10-CM

## 2023-07-25 DIAGNOSIS — E53.8 VITAMIN B12 DEFICIENCY: ICD-10-CM

## 2023-07-25 DIAGNOSIS — J45.30 MILD PERSISTENT ASTHMA WITHOUT COMPLICATION: ICD-10-CM

## 2023-07-25 DIAGNOSIS — G47.00 INSOMNIA, UNSPECIFIED TYPE: ICD-10-CM

## 2023-07-25 DIAGNOSIS — E53.8 FOLATE DEFICIENCY: ICD-10-CM

## 2023-07-25 DIAGNOSIS — E66.01 CLASS 3 SEVERE OBESITY WITH SERIOUS COMORBIDITY AND BODY MASS INDEX (BMI) OF 40.0 TO 44.9 IN ADULT, UNSPECIFIED OBESITY TYPE: ICD-10-CM

## 2023-07-25 DIAGNOSIS — I10 PRIMARY HYPERTENSION: ICD-10-CM

## 2023-07-25 LAB
25(OH)D3 SERPL-MCNC: 31.4 NG/ML (ref 30–100)
ALBUMIN SERPL-MCNC: 4.2 G/DL (ref 3.5–5.2)
ALBUMIN/GLOB SERPL: 1.6 G/DL
ALP SERPL-CCNC: 78 U/L (ref 39–117)
ALT SERPL W P-5'-P-CCNC: 12 U/L (ref 1–33)
ANION GAP SERPL CALCULATED.3IONS-SCNC: 12 MMOL/L (ref 5–15)
AST SERPL-CCNC: 10 U/L (ref 1–32)
BASOPHILS # BLD AUTO: 0.08 10*3/MM3 (ref 0–0.2)
BASOPHILS NFR BLD AUTO: 1 % (ref 0–1.5)
BILIRUB SERPL-MCNC: 0.2 MG/DL (ref 0–1.2)
BUN SERPL-MCNC: 18 MG/DL (ref 6–20)
BUN/CREAT SERPL: 19.4 (ref 7–25)
CALCIUM SPEC-SCNC: 9.3 MG/DL (ref 8.6–10.5)
CHLORIDE SERPL-SCNC: 103 MMOL/L (ref 98–107)
CO2 SERPL-SCNC: 25 MMOL/L (ref 22–29)
CREAT SERPL-MCNC: 0.93 MG/DL (ref 0.57–1)
DEPRECATED RDW RBC AUTO: 38.3 FL (ref 37–54)
EGFRCR SERPLBLD CKD-EPI 2021: 86.6 ML/MIN/1.73
EOSINOPHIL # BLD AUTO: 0.3 10*3/MM3 (ref 0–0.4)
EOSINOPHIL NFR BLD AUTO: 3.8 % (ref 0.3–6.2)
ERYTHROCYTE [DISTWIDTH] IN BLOOD BY AUTOMATED COUNT: 12.5 % (ref 12.3–15.4)
FERRITIN SERPL-MCNC: 75.3 NG/ML (ref 13–150)
FOLATE SERPL-MCNC: >20 NG/ML (ref 4.78–24.2)
GLOBULIN UR ELPH-MCNC: 2.7 GM/DL
GLUCOSE SERPL-MCNC: 97 MG/DL (ref 65–99)
HBA1C MFR BLD: 5.6 % (ref 4.8–5.6)
HCT VFR BLD AUTO: 40.3 % (ref 34–46.6)
HGB BLD-MCNC: 13.4 G/DL (ref 12–15.9)
IMM GRANULOCYTES # BLD AUTO: 0.05 10*3/MM3 (ref 0–0.05)
IMM GRANULOCYTES NFR BLD AUTO: 0.6 % (ref 0–0.5)
IRON 24H UR-MRATE: 62 MCG/DL (ref 37–145)
IRON SATN MFR SERPL: 15 % (ref 20–50)
LYMPHOCYTES # BLD AUTO: 2.47 10*3/MM3 (ref 0.7–3.1)
LYMPHOCYTES NFR BLD AUTO: 31 % (ref 19.6–45.3)
MCH RBC QN AUTO: 28.4 PG (ref 26.6–33)
MCHC RBC AUTO-ENTMCNC: 33.3 G/DL (ref 31.5–35.7)
MCV RBC AUTO: 85.4 FL (ref 79–97)
MONOCYTES # BLD AUTO: 0.66 10*3/MM3 (ref 0.1–0.9)
MONOCYTES NFR BLD AUTO: 8.3 % (ref 5–12)
NEUTROPHILS NFR BLD AUTO: 4.41 10*3/MM3 (ref 1.7–7)
NEUTROPHILS NFR BLD AUTO: 55.3 % (ref 42.7–76)
NRBC BLD AUTO-RTO: 0 /100 WBC (ref 0–0.2)
PLATELET # BLD AUTO: 332 10*3/MM3 (ref 140–450)
PMV BLD AUTO: 10 FL (ref 6–12)
POTASSIUM SERPL-SCNC: 4 MMOL/L (ref 3.5–5.2)
PROT SERPL-MCNC: 6.9 G/DL (ref 6–8.5)
RBC # BLD AUTO: 4.72 10*6/MM3 (ref 3.77–5.28)
SODIUM SERPL-SCNC: 140 MMOL/L (ref 136–145)
TIBC SERPL-MCNC: 416 MCG/DL (ref 298–536)
TRANSFERRIN SERPL-MCNC: 279 MG/DL (ref 200–360)
VIT B12 BLD-MCNC: 571 PG/ML (ref 211–946)
WBC NRBC COR # BLD: 7.97 10*3/MM3 (ref 3.4–10.8)

## 2023-07-25 PROCEDURE — 84466 ASSAY OF TRANSFERRIN: CPT | Performed by: NURSE PRACTITIONER

## 2023-07-25 PROCEDURE — 82746 ASSAY OF FOLIC ACID SERUM: CPT | Performed by: NURSE PRACTITIONER

## 2023-07-25 PROCEDURE — 83540 ASSAY OF IRON: CPT | Performed by: NURSE PRACTITIONER

## 2023-07-25 PROCEDURE — 85025 COMPLETE CBC W/AUTO DIFF WBC: CPT | Performed by: NURSE PRACTITIONER

## 2023-07-25 PROCEDURE — 80053 COMPREHEN METABOLIC PANEL: CPT | Performed by: NURSE PRACTITIONER

## 2023-07-25 PROCEDURE — 82728 ASSAY OF FERRITIN: CPT | Performed by: NURSE PRACTITIONER

## 2023-07-25 PROCEDURE — 83036 HEMOGLOBIN GLYCOSYLATED A1C: CPT | Performed by: NURSE PRACTITIONER

## 2023-07-25 PROCEDURE — 82607 VITAMIN B-12: CPT | Performed by: NURSE PRACTITIONER

## 2023-07-25 PROCEDURE — 82306 VITAMIN D 25 HYDROXY: CPT | Performed by: NURSE PRACTITIONER

## 2023-07-25 RX ORDER — MONTELUKAST SODIUM 10 MG/1
10 TABLET ORAL NIGHTLY
Qty: 30 TABLET | Refills: 5 | Status: SHIPPED | OUTPATIENT
Start: 2023-07-25

## 2023-07-25 RX ORDER — TRAZODONE HYDROCHLORIDE 50 MG/1
25-50 TABLET ORAL NIGHTLY PRN
Qty: 30 TABLET | Refills: 0 | Status: SHIPPED | OUTPATIENT
Start: 2023-07-25

## 2023-07-25 NOTE — TELEPHONE ENCOUNTER
Check with patient to see if she wants this at [facility] pharmacy or or [facility] pharmacy? I had sent her meds this morning to [facility] pharmacy. OK to refill trazodone.

## 2023-07-25 NOTE — PROGRESS NOTES
"Chief Complaint  Hypertension and Asthma    Subjective          Zhane Traylor, 28 y.o. female presents to [facility]  History of Present Illness   Patient today for a 3-month follow-up on prediabetes, hypertension and asthma.    She is complaining of right upper quadrant pain that radiates to her back.  She states that this is chronic and intermittent.    Asthma: She has Xopenex inhaler and nebulizer to use as needed for asthma but does need a refill.  She also is on Singulair 10 mg every evening and Advair inhaler for prevention.    Hypertension:  She is on losartan 50 mg daily, amlodipine 5 mg, and metoprolol XL one tab at bedtime.  She states that she has been having some hair loss and was concerned that the beta-blocker was causing hair loss.  She states cardiology told her she could stop the metoprolol to see how she does.  She states that when she stopped the metoprolol she was having heart palpitations and her heart rate increased.  She is supposed to be taking half a tablet in the morning and 1 tablet in the evening but she states she had only been taking the 1 tablet in the evening.    Prediabetes/Obesity: A1c improved to 5.6% 3 months ago. She is complaining of difficulty losing weight.     She has had low vitamin D, last vitamin D level was 24, started on vitamin D 5000 units daily 3 months ago.     Vitamin B12 and folate deficiency, started on oral vitamin B12 and folic acid.     I had started her on trazodone to take as needed due to some insomnia. She is only taking as needed and states it is helping.   She is following with psychiatry for anxiety.  She is currently on diazepam.       Tobacco Use: Medium Risk    Smoking Tobacco Use: Former    Smokeless Tobacco Use: Never    Passive Exposure: Past      Objective   Vital Signs:   /72   Pulse 84   Temp 96.6 °F (35.9 °C)   Ht 167.6 cm (66\")   Wt 125 kg (275 lb 6.4 oz)   SpO2 99%   BMI 44.45 kg/m²       Current " Outpatient Medications:     amLODIPine (NORVASC) 5 MG tablet, Take 1 tablet by mouth Daily., Disp: 90 tablet, Rfl: 1    cephalexin (KEFLEX) 500 MG capsule, 1 capsule by mouth every 8 hours, Disp: 30 capsule, Rfl: 0    cetirizine (zyrTEC) 10 MG tablet, Zyrtec 10 mg oral tablet take 1 tablet (10 mg) by oral route once daily   Active, Disp: , Rfl:     diazePAM (VALIUM) 2 MG tablet, Take 1 tablet by mouth 2 (Two) Times a Day., Disp: 60 tablet, Rfl: 0    diclofenac (VOLTAREN) 75 MG EC tablet, 1 tablet by mouth twice a day, Disp: 180 tablet, Rfl: 0    Ergocalciferol (VITAMIN D2 PO), Take 1 capsule by mouth Daily., Disp: , Rfl:     Etonogestrel (Nexplanon) 68 MG implant subdermal implant, Inject 1 each into the appropriate area of the skin as directed by provider., Disp: , Rfl:     Fluticasone-Salmeterol (ADVAIR/WIXELA) 250-50 MCG/ACT DISKUS, Inhale 1 puff 2 (Two) Times a Day. For asthma. Rinse mouth after use, Disp: 60 each, Rfl: 3    folic acid (FOLVITE) 1 MG tablet, Take 1 tablet by mouth Daily., Disp: 90 tablet, Rfl: 3    gabapentin (NEURONTIN) 300 MG capsule, 1 capsule by mouth three times a day, Disp: 270 capsule, Rfl: 0    lamoTRIgine (LaMICtal) 100 MG tablet, Take 1 tablet by mouth twice per day, Disp: 60 tablet, Rfl: 3    lamoTRIgine (LaMICtal) 100 MG tablet, Take 1 tablet by mouth 2 (Two) Times a Day., Disp: 60 tablet, Rfl: 1    levalbuterol (XOPENEX HFA) 45 MCG/ACT inhaler, Inhale 1-2 puffs Every 4 (Four) Hours As Needed for Wheezing or Shortness of Air., Disp: 15 g, Rfl: 5    levalbuterol (Xopenex) 1.25 MG/3ML nebulizer solution, Take 1 ampule by nebulization Every 4 (Four) Hours As Needed for Wheezing or Shortness of Air., Disp: 90 mL, Rfl: 5    losartan (Cozaar) 50 MG tablet, Take 1 tablet by mouth Daily., Disp: 90 tablet, Rfl: 3    lurasidone HCl (Latuda) 60 MG tablet tablet, Take 1 tablet by mouth every night at bedtime., Disp: 30 tablet, Rfl: 1    metoprolol succinate XL (TOPROL-XL) 50 MG 24 hr tablet,  Take 1/2 tablet in the morning and 1 tablet in the evening (Patient taking differently: 1.5 tablets. Take 1/2 tablet in the morning and 1 tablet in the evening), Disp: 135 tablet, Rfl: 3    montelukast (Singulair) 10 MG tablet, Take 1 tablet by mouth Every Night., Disp: 30 tablet, Rfl: 5    Retin-A 0.025 % cream, apply daily after washing, Disp: 45 g, Rfl: 2    spironolactone (ALDACTONE) 25 MG tablet, Take 1 tablet by mouth 3 (Three) Times a Day., Disp: 90 tablet, Rfl: 5    vitamin B-12 (CYANOCOBALAMIN) 1000 MCG tablet, Take 1 tablet by mouth Daily., Disp: 90 tablet, Rfl: 3    vitamin D3 (Vitamin D) 125 MCG (5000 UT) capsule capsule, Take 1 capsule by mouth Daily., Disp: 90 capsule, Rfl: 3    metFORMIN (Glucophage) 500 MG tablet, Take 1 tablet by mouth Daily With Dinner., Disp: 30 tablet, Rfl: 2    traZODone (DESYREL) 50 MG tablet, Take 0.5-1 tablets by mouth At Night As Needed for Sleep., Disp: 30 tablet, Rfl: 0   Past Medical History:   Diagnosis Date    Alcoholism in remission 02/05/2021    Anxiety disorder 01/05/2021    Asthma     Constipation     Encounter for blood transfusion     H/O psychiatric care     Hypertension     Left anterior cruciate ligament tear     Major depression 02/05/2021    Obesity     Onychomycosis     PONV (postoperative nausea and vomiting)       Physical Exam  Vitals reviewed.   Constitutional:       Appearance: Normal appearance. She is well-developed. She is morbidly obese.   Neck:      Thyroid: No thyroid mass, thyromegaly or thyroid tenderness.   Cardiovascular:      Rate and Rhythm: Normal rate and regular rhythm.      Heart sounds: No murmur heard.    No friction rub. No gallop.   Pulmonary:      Effort: Pulmonary effort is normal.      Breath sounds: Normal breath sounds. No wheezing or rhonchi.   Abdominal:      Palpations: Abdomen is soft.      Tenderness: There is abdominal tenderness in the right upper quadrant. There is no rebound.   Lymphadenopathy:      Cervical: No  cervical adenopathy.   Skin:     General: Skin is warm and dry.   Neurological:      Mental Status: She is alert and oriented to person, place, and time.      Cranial Nerves: No cranial nerve deficit.   Psychiatric:         Mood and Affect: Mood and affect normal.         Behavior: Behavior normal.         Thought Content: Thought content normal. Thought content does not include homicidal or suicidal ideation.         Judgment: Judgment normal.      Result Review :   {The following data was reviewed by CLEO Garza    No Images in the past 120 days found..    Common Labs   Common labs          2/1/2023    18:07 4/25/2023    07:52 7/25/2023    08:04   Common Labs   Glucose 114  89  97    BUN 23  15  18    Creatinine 1.10  1.01  0.93    Sodium 139  140  140    Potassium 4.2  3.9  4.0    Chloride 105  109  103    Calcium 9.4  9.9  9.3    Albumin  4.0  4.2    Total Bilirubin  <0.2  0.2    Alkaline Phosphatase  69  78    AST (SGOT)  11  10    ALT (SGPT)  15  12    WBC  10.88  7.97    Hemoglobin  13.6  13.4    Hematocrit  41.0  40.3    Platelets  280  332    Total Cholesterol  159     Triglycerides  87     HDL Cholesterol  39     LDL Cholesterol   104     Hemoglobin A1C  5.60  5.60      TSH   TSH          4/25/2023    07:52   TSH   TSH 2.810      VITD   Lab Results   Component Value Date    SJVN78GX 31.4 07/25/2023     VITB12   Lab Results   Component Value Date    RZXQYAHD19 571 07/25/2023     IRON    Iron   Date Value Ref Range Status   07/25/2023 62 37 - 145 mcg/dL Final     Iron Saturation (TSAT)   Date Value Ref Range Status   07/25/2023 15 (L) 20 - 50 % Final     Transferrin   Date Value Ref Range Status   07/25/2023 279 200 - 360 mg/dL Final     TIBC   Date Value Ref Range Status   07/25/2023 416 298 - 536 mcg/dL Final                Assessment and Plan    Diagnoses and all orders for this visit:    1. Prediabetes (Primary)  Assessment & Plan:  Due to her obesity and prediabetes, we will start her on  metformin.  We discussed potential side effects and drug efficacy.  Patient will follow-up with me in 3 months or sooner if any new or worsening symptoms.  Advised her to call if she has any adverse side effects.    Orders:  -     metFORMIN (Glucophage) 500 MG tablet; Take 1 tablet by mouth Daily With Dinner.  Dispense: 30 tablet; Refill: 2  -     Hemoglobin A1c  -     Comprehensive Metabolic Panel    2. Class 3 severe obesity with serious comorbidity and body mass index (BMI) of 40.0 to 44.9 in adult, unspecified obesity type  Assessment & Plan:  Patient's (Body mass index is 44.45 kg/m².) indicates that they are morbidly/severely obese (BMI > 40 or > 35 with obesity - related health condition) with health conditions that include hypertension and prediabetes  . Weight is unchanged. BMI  is above average; BMI management plan is completed. We discussed low calorie, low carb based diet program, portion control, increasing exercise, and we discussed treating prediabetes to help with insulin resistance. I will start her on metformin. We discuss if metformin is unsuccessful, we can try Ozempic .     Orders:  -     metFORMIN (Glucophage) 500 MG tablet; Take 1 tablet by mouth Daily With Dinner.  Dispense: 30 tablet; Refill: 2    3. Mild persistent asthma without complication  Assessment & Plan:  Asthma is improving with treatment.  The patient is experiencing no daytime asthma symptoms. She is experiencing no nighttime asthma symptoms.  Medications: continue Singulair and Advair as prescribed and Xopenex as needed.        Orders:  -     montelukast (Singulair) 10 MG tablet; Take 1 tablet by mouth Every Night.  Dispense: 30 tablet; Refill: 5    4. RUQ pain  Comments:  Due to her right upper quadrant pain, will order gallbladder ultrasound.  Orders:  -     US Gallbladder; Future    5. Primary hypertension  Assessment & Plan:  Hypertension is  stable .  Advised her to try taking half of metoprolol tablet to wean off of the  medication due to her potential side effect of hair loss.  She will continue losartan and amlodipine as prescribed.  Blood pressure will be reassessed in 3 months.      6. Iron deficiency  Assessment & Plan:  We will recheck her iron levels today.    Orders:  -     CBC Auto Differential  -     Iron Profile  -     Ferritin    7. Vitamin B12 deficiency  Assessment & Plan:  She will continue vitamin B12, we will check levels today.    Orders:  -     CBC Auto Differential  -     Vitamin B12 & Folate    8. Folate deficiency  Assessment & Plan:  She will continue folic acid, will check levels today.    Orders:  -     CBC Auto Differential  -     Vitamin B12 & Folate    9. Vitamin D deficiency  Assessment & Plan:  Stable, will continue vitamin D supplement.  We will check vitamin D level today.    Orders:  -     Vitamin D,25-Hydroxy        Follow Up   Return in about 3 months (around 10/25/2023) for 30 min apt for complex pt prediab, obesity, B12 def.  Patient was given instructions and counseling regarding her condition or for health maintenance advice. Please see specific information pulled into the AVS if appropriate.     Parts of this note are electronic transcriptions/translations of spoken language to printed text using the Dragon Dictation system.      Sonya Squires, APRN  07/25/2023

## 2023-07-26 PROBLEM — E53.8 FOLATE DEFICIENCY: Status: ACTIVE | Noted: 2023-07-26

## 2023-07-26 PROBLEM — E53.8 VITAMIN B12 DEFICIENCY: Status: ACTIVE | Noted: 2023-07-26

## 2023-07-26 NOTE — ASSESSMENT & PLAN NOTE
Hypertension is  stable .  Advised her to try taking half of metoprolol tablet to wean off of the medication due to her potential side effect of hair loss.  She will continue losartan and amlodipine as prescribed.  Blood pressure will be reassessed in 3 months.

## 2023-07-26 NOTE — ASSESSMENT & PLAN NOTE
Asthma is improving with treatment.  The patient is experiencing no daytime asthma symptoms. She is experiencing no nighttime asthma symptoms.  Medications: continue Singulair and Advair as prescribed and Xopenex as needed.

## 2023-07-26 NOTE — ASSESSMENT & PLAN NOTE
Patient's (Body mass index is 44.45 kg/m².) indicates that they are morbidly/severely obese (BMI > 40 or > 35 with obesity - related health condition) with health conditions that include hypertension and prediabetes  . Weight is unchanged. BMI  is above average; BMI management plan is completed. We discussed low calorie, low carb based diet program, portion control, increasing exercise, and we discussed treating prediabetes to help with insulin resistance. I will start her on metformin. We discuss if metformin is unsuccessful, we can try Ozempic .

## 2023-07-26 NOTE — ASSESSMENT & PLAN NOTE
Due to her obesity and prediabetes, we will start her on metformin.  We discussed potential side effects and drug efficacy.  Patient will follow-up with me in 3 months or sooner if any new or worsening symptoms.  Advised her to call if she has any adverse side effects.

## 2023-08-10 ENCOUNTER — OFFICE VISIT (OUTPATIENT)
Dept: PULMONOLOGY | Facility: CLINIC | Age: 28
End: 2023-08-10
Payer: COMMERCIAL

## 2023-08-10 VITALS
DIASTOLIC BLOOD PRESSURE: 73 MMHG | WEIGHT: 273.4 LBS | TEMPERATURE: 99.5 F | SYSTOLIC BLOOD PRESSURE: 107 MMHG | BODY MASS INDEX: 43.94 KG/M2 | HEART RATE: 114 BPM | OXYGEN SATURATION: 95 % | HEIGHT: 66 IN | RESPIRATION RATE: 16 BRPM

## 2023-08-10 DIAGNOSIS — E66.01 CLASS 3 SEVERE OBESITY DUE TO EXCESS CALORIES WITH SERIOUS COMORBIDITY AND BODY MASS INDEX (BMI) OF 40.0 TO 44.9 IN ADULT: Primary | ICD-10-CM

## 2023-08-10 DIAGNOSIS — R09.82 POSTNASAL DRIP: ICD-10-CM

## 2023-08-10 DIAGNOSIS — J45.20 MILD INTERMITTENT ASTHMA WITHOUT COMPLICATION: ICD-10-CM

## 2023-08-10 PROCEDURE — 3078F DIAST BP <80 MM HG: CPT | Performed by: INTERNAL MEDICINE

## 2023-08-10 PROCEDURE — 1160F RVW MEDS BY RX/DR IN RCRD: CPT | Performed by: INTERNAL MEDICINE

## 2023-08-10 PROCEDURE — 99214 OFFICE O/P EST MOD 30 MIN: CPT | Performed by: INTERNAL MEDICINE

## 2023-08-10 PROCEDURE — 3074F SYST BP LT 130 MM HG: CPT | Performed by: INTERNAL MEDICINE

## 2023-08-10 PROCEDURE — 1159F MED LIST DOCD IN RCRD: CPT | Performed by: INTERNAL MEDICINE

## 2023-08-10 NOTE — PROGRESS NOTES
Pulmonary Office Follow-up    Subjective     Zhane Traylor is seen today at the office for   Chief Complaint   Patient presents with    Follow-up    Asthma         HPI  Zhane Traylor is a 28 y.o. female with a PMH significant for asthma and obesity presents for follow-up patient has had recurrent bouts of bronchospasm requiring steroids couple of times before this visit she complains of mild chest congestion patient has been taking Xopenex as needed along with Advair and nebulizer treatment as needed she denies any significant expectoration chest pain or fever      Tobacco use history:  Former smoker      Patient Active Problem List   Diagnosis    Alcoholism in remission    Allergic rhinitis    Anxiety disorder    Asthma    Prediabetes    Major depression    Class 3 severe obesity with serious comorbidity and body mass index (BMI) of 40.0 to 44.9 in adult    Injury of left knee    Left knee pain    Left anterior cruciate ligament tear    Closed fracture of left tibial plateau    Tear of lateral meniscus of left knee    Acute medial meniscus tear of left knee    Sprain of medial collateral ligament of left knee    Acute injury of left anterior cruciate ligament    Aftercare following surgery of left knee arthroscopic ACL reconstruction with allograft, 2/21/2022    MPFL tear    Sprain of medial collateral ligament of right knee    Primary hypertension    Tachycardia    Vitamin D deficiency    Iron deficiency    Mild intermittent asthma with exacerbation    History of alcohol abuse    Elevated LFTs    Insomnia    Intermittent palpitations    Vasovagal syncope    Vitamin B12 deficiency    Folate deficiency       Review of Systems  Review of Systems   HENT:  Positive for postnasal drip.    Respiratory:  Positive for shortness of breath.    All other systems reviewed and are negative.  As described in the HPI. Otherwise, remainder of ROS (14 systems) were negative.    Medications, Allergies, Social, and Family  Histories reviewed as per EMR.    Objective     Vitals:    08/10/23 1551   BP: 107/73   Pulse: 114   Resp: 16   Temp: 99.5 øF (37.5 øC)   SpO2: 95%         08/10/23  1551   Weight: 124 kg (273 lb 6.4 oz)       Physical Exam  Vitals and nursing note reviewed.   Constitutional:       Appearance: Normal appearance. She is obese.   HENT:      Head: Normocephalic and atraumatic.      Nose: Congestion present.      Mouth/Throat:      Mouth: Mucous membranes are moist.      Pharynx: Oropharynx is clear.   Eyes:      Extraocular Movements: Extraocular movements intact.      Conjunctiva/sclera: Conjunctivae normal.      Pupils: Pupils are equal, round, and reactive to light.   Cardiovascular:      Rate and Rhythm: Normal rate and regular rhythm.      Pulses: Normal pulses.      Heart sounds: Normal heart sounds.   Pulmonary:      Effort: Pulmonary effort is normal.      Breath sounds: Normal breath sounds.   Abdominal:      General: Abdomen is flat. Bowel sounds are normal.      Palpations: Abdomen is soft.   Musculoskeletal:         General: Normal range of motion.      Cervical back: Normal range of motion and neck supple.   Skin:     General: Skin is warm.      Capillary Refill: Capillary refill takes 2 to 3 seconds.   Neurological:      General: No focal deficit present.      Mental Status: She is alert and oriented to person, place, and time.   Psychiatric:         Mood and Affect: Mood normal.         Behavior: Behavior normal.       No radiology results for the last 90 days.     Assessment & Plan     Diagnoses and all orders for this visit:    1. Class 3 severe obesity due to excess calories with serious comorbidity and body mass index (BMI) of 40.0 to 44.9 in adult (Primary)    2. Mild intermittent asthma without complication         Discussion/ Recommendations:   We will order chest x-ray  Continue Advair along with Xopenex  Advised to reduce weight  Patient is planning to start Ozempic in a couple of  months  Regular exercise  Astepro nasal spray for allergic rhinitis and postnasal drip  Vaccinations discussed and recommended             Return in about 4 months (around 12/10/2023).          This document has been electronically signed by Ramon Knowles MD on August 10, 2023 16:04 EDT

## 2023-08-16 ENCOUNTER — HOSPITAL ENCOUNTER (OUTPATIENT)
Dept: ULTRASOUND IMAGING | Facility: HOSPITAL | Age: 28
Discharge: HOME | End: 2023-08-16
Payer: COMMERCIAL

## 2023-08-16 ENCOUNTER — HOSPITAL ENCOUNTER (OUTPATIENT)
Dept: MRI IMAGING | Facility: HOSPITAL | Age: 28
Discharge: HOME | End: 2023-08-16
Payer: COMMERCIAL

## 2023-08-16 DIAGNOSIS — M25.562 LEFT KNEE PAIN, UNSPECIFIED CHRONICITY: ICD-10-CM

## 2023-08-16 DIAGNOSIS — Z47.89 AFTERCARE FOLLOWING SURGERY OF THE MUSCULOSKELETAL SYSTEM: ICD-10-CM

## 2023-08-16 DIAGNOSIS — R10.11 RUQ PAIN: ICD-10-CM

## 2023-08-16 PROCEDURE — 76705 ECHO EXAM OF ABDOMEN: CPT

## 2023-08-16 PROCEDURE — 73721 MRI JNT OF LWR EXTRE W/O DYE: CPT

## 2023-08-22 ENCOUNTER — OFFICE VISIT (OUTPATIENT)
Dept: FAMILY MEDICINE CLINIC | Facility: CLINIC | Age: 28
End: 2023-08-22
Payer: COMMERCIAL

## 2023-08-22 VITALS
BODY MASS INDEX: 43.57 KG/M2 | WEIGHT: 271.1 LBS | DIASTOLIC BLOOD PRESSURE: 94 MMHG | SYSTOLIC BLOOD PRESSURE: 156 MMHG | TEMPERATURE: 98 F | HEART RATE: 109 BPM | OXYGEN SATURATION: 97 % | HEIGHT: 66 IN

## 2023-08-22 DIAGNOSIS — K59.00 CONSTIPATION, UNSPECIFIED CONSTIPATION TYPE: ICD-10-CM

## 2023-08-22 DIAGNOSIS — R10.13 EPIGASTRIC ABDOMINAL PAIN: ICD-10-CM

## 2023-08-22 DIAGNOSIS — I10 PRIMARY HYPERTENSION: ICD-10-CM

## 2023-08-22 DIAGNOSIS — R11.2 NAUSEA AND VOMITING, UNSPECIFIED VOMITING TYPE: Primary | ICD-10-CM

## 2023-08-22 DIAGNOSIS — F17.219 CIGARETTE NICOTINE DEPENDENCE WITH NICOTINE-INDUCED DISORDER: ICD-10-CM

## 2023-08-22 PROCEDURE — 86677 HELICOBACTER PYLORI ANTIBODY: CPT | Performed by: NURSE PRACTITIONER

## 2023-08-22 PROCEDURE — 1159F MED LIST DOCD IN RCRD: CPT | Performed by: NURSE PRACTITIONER

## 2023-08-22 PROCEDURE — 1160F RVW MEDS BY RX/DR IN RCRD: CPT | Performed by: NURSE PRACTITIONER

## 2023-08-22 PROCEDURE — 99407 BEHAV CHNG SMOKING > 10 MIN: CPT | Performed by: NURSE PRACTITIONER

## 2023-08-22 PROCEDURE — 99214 OFFICE O/P EST MOD 30 MIN: CPT | Performed by: NURSE PRACTITIONER

## 2023-08-22 PROCEDURE — 3077F SYST BP >= 140 MM HG: CPT | Performed by: NURSE PRACTITIONER

## 2023-08-22 PROCEDURE — 3080F DIAST BP >= 90 MM HG: CPT | Performed by: NURSE PRACTITIONER

## 2023-08-22 RX ORDER — NICOTINE 21 MG/24HR
1 PATCH, TRANSDERMAL 24 HOURS TRANSDERMAL EVERY 24 HOURS
Qty: 14 PATCH | Refills: 0 | Status: SHIPPED | OUTPATIENT
Start: 2023-08-22 | End: 2023-09-05

## 2023-08-22 RX ORDER — FAMOTIDINE 20 MG/1
20 TABLET, FILM COATED ORAL 2 TIMES DAILY PRN
Qty: 60 TABLET | Refills: 0 | Status: SHIPPED | OUTPATIENT
Start: 2023-08-22

## 2023-08-22 RX ORDER — ONDANSETRON 8 MG/1
8 TABLET, ORALLY DISINTEGRATING ORAL EVERY 8 HOURS PRN
Qty: 30 TABLET | Refills: 2 | Status: SHIPPED | OUTPATIENT
Start: 2023-08-22

## 2023-08-22 RX ORDER — NICOTINE 21 MG/24HR
1 PATCH, TRANSDERMAL 24 HOURS TRANSDERMAL EVERY 24 HOURS
Qty: 14 PATCH | Refills: 0 | Status: SHIPPED | OUTPATIENT
Start: 2023-09-05 | End: 2023-09-19

## 2023-08-22 NOTE — ASSESSMENT & PLAN NOTE
Tobacco use is newly identified.  Smoking cessation counseling was provided.  Pharmacotherapy was prescribed as ordered.  We discussed different options and she would like to try nicotine patches.  I did send prescription for nicotine patches.  We discussed starting on the high dose for the first 2 weeks, then decreasing to the next dose for 2 weeks and then decreasing to the lowest dose for 2 to 4 weeks.  Tobacco use will be reassessed  in 2 months .

## 2023-08-22 NOTE — ASSESSMENT & PLAN NOTE
She is having new onset of constipation, most likely due to the iron and multivitamin and other medicines she is taking.  I recommend that she start over-the-counter fiber Gummies.  Also advised she can use MiraLAX as needed.

## 2023-08-22 NOTE — ASSESSMENT & PLAN NOTE
We discussed possibility of H. pylori and will order test today.  Advised her of treatment if she does have H. pylori.  I will start her on Pepcid 20 mg twice daily.  HIDA scan has been ordered and she is waiting on approval from her insurance.

## 2023-08-22 NOTE — LETTER
Miranda Casanova is a 26 year old female presenting with RT ITB syndrome, RT runner's knee, and TMJ.      ALLERGIES:  No Known Allergies     Denies Latex allergy or sensitivity.     Patient notes that she currently is not a smoker.    Visit Vitals  LMP 07/16/2023 (Exact Date)       No changes to Patient's medical history or social history since their last visit.         August 22, 2023     Patient: Zhane Traylor   YOB: 1995   Date of Visit: 8/22/2023       To Whom It May Concern:    It is my medical opinion that Zhane Traylor may return to work in one day on 8/23/2023.            Sincerely,        CLEO Garza    CC: No Recipients

## 2023-08-22 NOTE — ASSESSMENT & PLAN NOTE
Blood pressure is high today but patient has missed taking her medications.  Vies to take her medications when she gets home.

## 2023-08-22 NOTE — PROGRESS NOTES
Chief Complaint  Nausea (Ongoing issue) and Nicotine Dependence (Trying to quit smoking )    Subjective          Zhane Traylor, 28 y.o. female presents to [facility]  History of Present Illness   Patient presents today due to complaints of nausea after trying to quit smoking.  She states she did not want to tell anybody that she was smoking.  She has tried to quit smoking and then when she tried to smoke again she has felt nauseous.  She is complaining that her blood pressure is high due to her anxiety.  She also states she had not taken her blood pressure medicines today.  She is wanting help quitting smoking.    Zhane Traylor  reports that she has been smoking cigarettes. She started smoking about 12 years ago. She has a 4.50 pack-year smoking history. She has been exposed to tobacco smoke. She has never used smokeless tobacco.. I have educated her on the risk of diseases from using tobacco products such as cancer, COPD, heart disease, reproductive problems, low birth weight, and cataracts.     I advised her to quit and she is willing to quit. We have discussed the following method/s for tobacco cessation:  Education Material Counseling [place] OTC Cessation Products Prescription Medicaiton.  Together we have set a quit date for 1 week from today.  She will follow up with me in 2 months or sooner to check on her progress.    I spent >10 minutes counseling the patient.    Tobacco Use: High Risk    Smoking Tobacco Use: Every Day    Smokeless Tobacco Use: Never    Passive Exposure: Past      She also is complaining of constipation.  She states that she is going 2 or 3 days without bowel movements and that her bowel movements are sporadic.  She is on a lot of medications and knows that that may be what has changed.  She is on a multivitamin with iron, vitamin B and folic acid.  She has not tried any over-the-counter stool softeners.      She is also still complaining of the pain  "radiating from her epigastric and right upper quadrant around to her back.  Her ultrasound of the gallbladder was negative.  She is waiting for approval from the insurance to get the HIDA scan.    Objective   Vital Signs:   /94   Pulse 109   Temp 98 øF (36.7 øC)   Ht 167.6 cm (66\")   Wt 123 kg (271 lb 1.6 oz)   SpO2 97%   BMI 43.76 kg/mý       Current Outpatient Medications:     amLODIPine (NORVASC) 5 MG tablet, Take 1 tablet by mouth Daily., Disp: 90 tablet, Rfl: 1    cetirizine (zyrTEC) 10 MG tablet, Zyrtec 10 mg oral tablet take 1 tablet (10 mg) by oral route once daily   Active, Disp: , Rfl:     diclofenac (VOLTAREN) 75 MG EC tablet, 1 tablet by mouth twice a day, Disp: 180 tablet, Rfl: 0    Etonogestrel (Nexplanon) 68 MG implant subdermal implant, Inject 1 each into the appropriate area of the skin as directed by provider., Disp: , Rfl:     Fluticasone-Salmeterol (ADVAIR/WIXELA) 250-50 MCG/ACT DISKUS, Inhale 1 puff 2 (Two) Times a Day. For asthma. Rinse mouth after use, Disp: 60 each, Rfl: 3    folic acid (FOLVITE) 1 MG tablet, Take 1 tablet by mouth Daily., Disp: 90 tablet, Rfl: 3    gabapentin (NEURONTIN) 300 MG capsule, 1 capsule by mouth three times a day, Disp: 270 capsule, Rfl: 0    lamoTRIgine (LaMICtal) 100 MG tablet, Take 1 tablet by mouth 2 (Two) Times a Day., Disp: 60 tablet, Rfl: 3    levalbuterol (XOPENEX HFA) 45 MCG/ACT inhaler, Inhale 1-2 puffs Every 4 (Four) Hours As Needed for Wheezing or Shortness of Air., Disp: 15 g, Rfl: 5    levalbuterol (Xopenex) 1.25 MG/3ML nebulizer solution, Take 1 ampule by nebulization Every 4 (Four) Hours As Needed for Wheezing or Shortness of Air., Disp: 90 mL, Rfl: 5    losartan (Cozaar) 50 MG tablet, Take 1 tablet by mouth Daily., Disp: 90 tablet, Rfl: 3    lurasidone HCl (Latuda) 60 MG tablet tablet, Take 1 tablet by mouth every night at bedtime., Disp: 30 tablet, Rfl: 3    metFORMIN (Glucophage) 500 MG tablet, Take 1 tablet by mouth Daily With " Dinner., Disp: 30 tablet, Rfl: 2    metoprolol succinate XL (TOPROL-XL) 50 MG 24 hr tablet, Take 1/2 tablet in the morning and 1 tablet in the evening (Patient taking differently: 1.5 tablets. Take 1/2 tablet in the morning and 1 tablet in the evening), Disp: 135 tablet, Rfl: 3    montelukast (Singulair) 10 MG tablet, Take 1 tablet by mouth Every Night., Disp: 30 tablet, Rfl: 5    Retin-A 0.025 % cream, apply daily after washing, Disp: 45 g, Rfl: 2    spironolactone (ALDACTONE) 25 MG tablet, Take 1 tablet by mouth 3 (Three) Times a Day., Disp: 90 tablet, Rfl: 5    traZODone (DESYREL) 50 MG tablet, Take 0.5-1 tablets by mouth At Night As Needed for Sleep., Disp: 30 tablet, Rfl: 0    vitamin B-12 (CYANOCOBALAMIN) 1000 MCG tablet, Take 1 tablet by mouth Daily., Disp: 90 tablet, Rfl: 3    vitamin D3 (Vitamin D) 125 MCG (5000 UT) capsule capsule, Take 1 capsule by mouth Daily., Disp: 90 capsule, Rfl: 3    famotidine (Pepcid) 20 MG tablet, Take 1 tablet by mouth 2 (Two) Times a Day As Needed for Indigestion., Disp: 60 tablet, Rfl: 0    famotidine (PEPCID) 20 MG tablet, Take 1 tablet by mouth 2 (Two) Times a Day As Needed., Disp: 60 tablet, Rfl: 0    LORazepam (ATIVAN) 1 MG tablet, Take 1 tablet by mouth 2 (Two) Times a Day., Disp: 60 tablet, Rfl: 0    Multiple Vitamins-Minerals (MULTIVITAMIN ADULT, MINERALS, PO), Take  by mouth., Disp: , Rfl:     [START ON 9/5/2023] nicotine (Nicoderm CQ) 14 MG/24HR patch, Place 1 patch on the skin as directed by provider Daily for 14 days., Disp: 14 patch, Rfl: 0    nicotine (NICODERM CQ) 14 MG/24HR patch, Place 1 patch on the skin as directed by provider Daily As Directed., Disp: 14 each, Rfl: 0    nicotine (Nicoderm CQ) 21 MG/24HR patch, Place 1 patch on the skin as directed by provider Daily for 14 days., Disp: 14 patch, Rfl: 0    nicotine (NICODERM CQ) 21 MG/24HR patch, Place 1 patch on the skin as directed by provider Daily As Directed., Disp: 14 each, Rfl: 0    [START ON 9/19/2023]  nicotine (Nicoderm CQ) 7 MG/24HR patch, Place 1 patch on the skin as directed by provider Daily for 30 days., Disp: 30 patch, Rfl: 0    nicotine (NICODERM CQ) 7 MG/24HR patch, Place 1 patch on the skin as directed by provider Daily As Directed., Disp: 28 each, Rfl: 0    ondansetron ODT (ZOFRAN-ODT) 8 MG disintegrating tablet, Place 1 tablet on the tongue Every 8 (Eight) Hours As Needed for Nausea or Vomiting., Disp: 30 tablet, Rfl: 2   Past Medical History:   Diagnosis Date    Alcoholism in remission 02/05/2021    Anxiety disorder 01/05/2021    Asthma     Constipation     Encounter for blood transfusion     H/O psychiatric care     Hypertension     Left anterior cruciate ligament tear     Major depression 02/05/2021    Obesity     Onychomycosis     PONV (postoperative nausea and vomiting)       Physical Exam  Vitals reviewed.   Constitutional:       Appearance: Normal appearance. She is well-developed. She is morbidly obese.   Neck:      Thyroid: No thyroid mass, thyromegaly or thyroid tenderness.   Cardiovascular:      Rate and Rhythm: Normal rate and regular rhythm.      Heart sounds: No murmur heard.    No friction rub. No gallop.   Pulmonary:      Effort: Pulmonary effort is normal.      Breath sounds: Normal breath sounds. No wheezing or rhonchi.   Abdominal:      General: Abdomen is protuberant. Bowel sounds are normal.      Palpations: Abdomen is soft.      Tenderness: There is no abdominal tenderness.   Lymphadenopathy:      Cervical: No cervical adenopathy.   Skin:     General: Skin is warm and dry.   Neurological:      Mental Status: She is alert and oriented to person, place, and time.      Cranial Nerves: No cranial nerve deficit.   Psychiatric:         Mood and Affect: Mood and affect normal.         Behavior: Behavior normal.         Thought Content: Thought content normal. Thought content does not include homicidal or suicidal ideation.         Judgment: Judgment normal.      Result Review :   {The  following data was reviewed by CLEO Garza    US Gallbladder    Result Date: 8/16/2023   Unremarkable right upper quadrant ultrasound     MARISA BANGURA MD       Electronically Signed and Approved By: MARISA BANGURA MD on 8/16/2023 at 10:59             MRI Knee Left Without Contrast    Result Date: 8/16/2023    1. Status post anterior cruciate ligament reconstruction. 2. Expansile fluid signal in the proximal tibial tunnel with surrounding tibial marrow edema signal.  This could be associated with graft motion and pain. 3. Graft appears to slightly abut the roof of the intercondylar notch which could indicate roof impingement.  Mild hyperintense signal in the graft without definite focal defect. 4. Possible small focus of arthrofibrosis along the distal graft.      YOSVANY GRIMES MD       Electronically Signed and Approved By: YOSVANY GRIMES MD on 8/16/2023 at 11:35               Common Labs   Common labs          2/1/2023    18:07 4/25/2023    07:52 7/25/2023    08:04   Common Labs   Glucose 114  89  97    BUN 23  15  18    Creatinine 1.10  1.01  0.93    Sodium 139  140  140    Potassium 4.2  3.9  4.0    Chloride 105  109  103    Calcium 9.4  9.9  9.3    Albumin  4.0  4.2    Total Bilirubin  <0.2  0.2    Alkaline Phosphatase  69  78    AST (SGOT)  11  10    ALT (SGPT)  15  12    WBC  10.88  7.97    Hemoglobin  13.6  13.4    Hematocrit  41.0  40.3    Platelets  280  332    Total Cholesterol  159     Triglycerides  87     HDL Cholesterol  39     LDL Cholesterol   104     Hemoglobin A1C  5.60  5.60             Assessment and Plan    Diagnoses and all orders for this visit:    1. Nausea and vomiting, unspecified vomiting type (Primary)  Assessment & Plan:  I will prescribe her Zofran to take as needed for nausea/vomiting.    Orders:  -     ondansetron ODT (ZOFRAN-ODT) 8 MG disintegrating tablet; Place 1 tablet on the tongue Every 8 (Eight) Hours As Needed for Nausea or Vomiting.  Dispense: 30 tablet;  Refill: 2  -     Helicobacter Pylori, IgA IgG IgM  -     famotidine (Pepcid) 20 MG tablet; Take 1 tablet by mouth 2 (Two) Times a Day As Needed for Indigestion.  Dispense: 60 tablet; Refill: 0    2. Cigarette nicotine dependence with nicotine-induced disorder  Assessment & Plan:  Tobacco use is newly identified.  Smoking cessation counseling was provided.  Pharmacotherapy was prescribed as ordered.  We discussed different options and she would like to try nicotine patches.  I did send prescription for nicotine patches.  We discussed starting on the high dose for the first 2 weeks, then decreasing to the next dose for 2 weeks and then decreasing to the lowest dose for 2 to 4 weeks.  Tobacco use will be reassessed  in 2 months .    Orders:  -     nicotine (Nicoderm CQ) 21 MG/24HR patch; Place 1 patch on the skin as directed by provider Daily for 14 days.  Dispense: 14 patch; Refill: 0  -     nicotine (Nicoderm CQ) 14 MG/24HR patch; Place 1 patch on the skin as directed by provider Daily for 14 days.  Dispense: 14 patch; Refill: 0  -     nicotine (Nicoderm CQ) 7 MG/24HR patch; Place 1 patch on the skin as directed by provider Daily for 30 days.  Dispense: 30 patch; Refill: 0    3. Epigastric abdominal pain  Assessment & Plan:  We discussed possibility of H. pylori and will order test today.  Advised her of treatment if she does have H. pylori.  I will start her on Pepcid 20 mg twice daily.  HIDA scan has been ordered and she is waiting on approval from her insurance.    Orders:  -     Helicobacter Pylori, IgA IgG IgM  -     famotidine (Pepcid) 20 MG tablet; Take 1 tablet by mouth 2 (Two) Times a Day As Needed for Indigestion.  Dispense: 60 tablet; Refill: 0    4. Constipation, unspecified constipation type  Assessment & Plan:  She is having new onset of constipation, most likely due to the iron and multivitamin and other medicines she is taking.  I recommend that she start over-the-counter fiber Gummies.  Also advised  she can use MiraLAX as needed.      5. Primary hypertension  Assessment & Plan:  Blood pressure is high today but patient has missed taking her medications.  Vies to take her medications when she gets home.          Follow Up   Return for Keep Scheduled Follow-up 10/27/2023.  Patient was given instructions and counseling regarding her condition or for health maintenance advice. Please see specific information pulled into the AVS if appropriate.     Parts of this note are electronic transcriptions/translations of spoken language to printed text using the Dragon Dictation system.      Sonya Squires, CLEO  08/22/2023

## 2023-08-24 LAB
H PYLORI IGA SER-ACNC: <9 UNITS (ref 0–8.9)
H PYLORI IGG SER IA-ACNC: 0.1 INDEX VALUE (ref 0–0.79)
H PYLORI IGM SER-ACNC: <9 UNITS (ref 0–8.9)

## 2023-09-15 ENCOUNTER — HOSPITAL ENCOUNTER (OUTPATIENT)
Dept: NUCLEAR MEDICINE | Facility: HOSPITAL | Age: 28
Discharge: HOME | End: 2023-09-15
Admitting: NURSE PRACTITIONER
Payer: COMMERCIAL

## 2023-09-15 DIAGNOSIS — R10.11 RUQ PAIN: ICD-10-CM

## 2023-09-15 PROCEDURE — 0 TECHNETIUM TC 99M MEBROFENIN KIT: Performed by: NURSE PRACTITIONER

## 2023-09-15 PROCEDURE — A9537 TC99M MEBROFENIN: HCPCS | Performed by: NURSE PRACTITIONER

## 2023-09-15 PROCEDURE — 78227 HEPATOBIL SYST IMAGE W/DRUG: CPT

## 2023-09-15 RX ORDER — KIT FOR THE PREPARATION OF TECHNETIUM TC 99M MEBROFENIN 45 MG/10ML
1 INJECTION, POWDER, LYOPHILIZED, FOR SOLUTION INTRAVENOUS
Status: COMPLETED | OUTPATIENT
Start: 2023-09-15 | End: 2023-09-15

## 2023-09-15 RX ADMIN — KIT FOR THE PREPARATION OF TECHNETIUM TC 99M MEBROFENIN 1 DOSE: 45 INJECTION, POWDER, LYOPHILIZED, FOR SOLUTION INTRAVENOUS at 10:57

## 2023-10-10 DIAGNOSIS — G47.00 INSOMNIA, UNSPECIFIED TYPE: ICD-10-CM

## 2023-10-10 RX ORDER — TRAZODONE HYDROCHLORIDE 50 MG/1
25-50 TABLET ORAL NIGHTLY PRN
Qty: 30 TABLET | Refills: 0 | Status: SHIPPED | OUTPATIENT
Start: 2023-10-10

## 2023-10-26 ENCOUNTER — HOSPITAL ENCOUNTER (OUTPATIENT)
Dept: GENERAL RADIOLOGY | Facility: HOSPITAL | Age: 28
Discharge: HOME | End: 2023-10-26
Admitting: INTERNAL MEDICINE
Payer: COMMERCIAL

## 2023-10-26 DIAGNOSIS — R09.82 POSTNASAL DRIP: ICD-10-CM

## 2023-10-26 DIAGNOSIS — J45.20 MILD INTERMITTENT ASTHMA WITHOUT COMPLICATION: ICD-10-CM

## 2023-10-26 PROCEDURE — 71046 X-RAY EXAM CHEST 2 VIEWS: CPT

## 2023-10-27 ENCOUNTER — OFFICE VISIT (OUTPATIENT)
Dept: FAMILY MEDICINE CLINIC | Facility: CLINIC | Age: 28
End: 2023-10-27
Payer: COMMERCIAL

## 2023-10-27 VITALS
WEIGHT: 270.9 LBS | OXYGEN SATURATION: 100 % | TEMPERATURE: 97.4 F | SYSTOLIC BLOOD PRESSURE: 135 MMHG | DIASTOLIC BLOOD PRESSURE: 91 MMHG | BODY MASS INDEX: 43.54 KG/M2 | HEART RATE: 94 BPM | HEIGHT: 66 IN

## 2023-10-27 DIAGNOSIS — R73.03 PREDIABETES: Primary | ICD-10-CM

## 2023-10-27 DIAGNOSIS — E55.9 VITAMIN D DEFICIENCY: ICD-10-CM

## 2023-10-27 DIAGNOSIS — I10 PRIMARY HYPERTENSION: ICD-10-CM

## 2023-10-27 DIAGNOSIS — E53.8 VITAMIN B12 DEFICIENCY: ICD-10-CM

## 2023-10-27 DIAGNOSIS — J45.40 MODERATE PERSISTENT ASTHMA, UNSPECIFIED WHETHER COMPLICATED: ICD-10-CM

## 2023-10-27 DIAGNOSIS — E66.01 CLASS 3 SEVERE OBESITY WITH SERIOUS COMORBIDITY AND BODY MASS INDEX (BMI) OF 40.0 TO 44.9 IN ADULT, UNSPECIFIED OBESITY TYPE: ICD-10-CM

## 2023-10-27 DIAGNOSIS — Z23 NEED FOR INFLUENZA VACCINATION: ICD-10-CM

## 2023-10-27 NOTE — ASSESSMENT & PLAN NOTE
Patient's (Body mass index is 43.72 kg/m².) indicates that they are morbidly/severely obese (BMI > 40 or > 35 with obesity - related health condition) with health conditions that include hypertension and prediabetes  . Weight is unchanged. BMI  is above average; BMI management plan is completed. We discussed low calorie, low carb based diet program, portion control, increasing exercise, Information on healthy weight added to patient's after visit summary, and information on bariatric surgery provided, see AVS .

## 2023-10-27 NOTE — ASSESSMENT & PLAN NOTE
Asthma is improving with treatment.  The patient is experiencing no daytime asthma symptoms. She is experiencing no nighttime asthma symptoms.  Asthma information handout given.  Reduce exposure to inhaled allergens: Stop smoking.  Continue all medications as prescribed.

## 2023-10-27 NOTE — ASSESSMENT & PLAN NOTE
I advised her that Ozempic is not indicated for prediabetes and that she is not a candidate.  A1c is stable, will plan to recheck in 3 months.

## 2023-10-27 NOTE — PROGRESS NOTES
"Chief Complaint  Hypertension, Prediabetes, and Obesity    Subjective          Zhane Traylor, 28 y.o. female presents to [facility]  History of Present Illness   Patient today for a 3-month follow-up on prediabetes, hypertension and asthma.    She states she is no longer having nausea or vomiting since starting Pepcid.    Asthma: She has Xopenex inhaler and nebulizer to use as needed for asthma.  She also is on Singulair 10 mg every evening and Advair inhaler for prevention.  She states that she is working on quitting smoking and has decreased the amount.  She is following with pulmonology now.    Hypertension:  She is on losartan 50 mg daily, amlodipine 5 mg, and metoprolol XL one tab at bedtime.  Her blood pressure is slightly above goal today but she states she forgot to take her medications last night.    Prediabetes/Obesity: A1c improved to 5.6% 3 months ago. She is complaining of difficulty losing weight. She has been on metformin, but has not lost any weight in the last 3 months.  She was hoping she could start Ozempic.    She has had low vitamin D, last vitamin D level did improve but is still low normal, on vitamin D 5000 units daily.     Vitamin B12 and folate did improve on oral vitamin B12 and folic acid.     I had started her on trazodone to take as needed due to some insomnia. She is only taking as needed and states it is helping.   She is following with psychiatry for anxiety.         Tobacco Use: High Risk (10/27/2023)    Patient History     Smoking Tobacco Use: Every Day     Smokeless Tobacco Use: Never     Passive Exposure: Past      Objective   Vital Signs:   /91   Pulse 94   Temp 97.4 °F (36.3 °C)   Ht 167.6 cm (66\")   Wt 123 kg (270 lb 14.4 oz)   SpO2 100%   BMI 43.72 kg/m²       Current Outpatient Medications:     amLODIPine (NORVASC) 5 MG tablet, Take 1 tablet by mouth Daily., Disp: 90 tablet, Rfl: 1    cetirizine (zyrTEC) 10 MG tablet, Zyrtec 10 " mg oral tablet take 1 tablet (10 mg) by oral route once daily   Active, Disp: , Rfl:     diclofenac (VOLTAREN) 75 MG EC tablet, 1 tablet by mouth twice a day, Disp: 180 tablet, Rfl: 0    Etonogestrel (Nexplanon) 68 MG implant subdermal implant, Inject 1 each into the appropriate area of the skin as directed by provider., Disp: , Rfl:     famotidine (Pepcid) 20 MG tablet, Take 1 tablet by mouth 2 (Two) Times a Day As Needed for Indigestion., Disp: 60 tablet, Rfl: 0    Fluticasone-Salmeterol (ADVAIR/WIXELA) 250-50 MCG/ACT DISKUS, Inhale 1 puff 2 (Two) Times a Day. For asthma. Rinse mouth after use, Disp: 60 each, Rfl: 3    folic acid (FOLVITE) 1 MG tablet, Take 1 tablet by mouth Daily., Disp: 90 tablet, Rfl: 3    gabapentin (NEURONTIN) 300 MG capsule, Take 1 capsule by mouth 3 times a day., Disp: 270 capsule, Rfl: 0    lamoTRIgine (LaMICtal) 100 MG tablet, Take 1 tablet by mouth 2 (Two) Times a Day., Disp: 60 tablet, Rfl: 3    levalbuterol (XOPENEX HFA) 45 MCG/ACT inhaler, Inhale 1-2 puffs Every 4 (Four) Hours As Needed for Wheezing or Shortness of Air., Disp: 15 g, Rfl: 5    levalbuterol (Xopenex) 1.25 MG/3ML nebulizer solution, Take 1 ampule by nebulization Every 4 (Four) Hours As Needed for Wheezing or Shortness of Air., Disp: 90 mL, Rfl: 5    LORazepam ER (Loreev XR) 3 MG capsule extended-release 24 hour sprinkle, Take 1 capsule by mouth Daily., Disp: 30 capsule, Rfl: 0    losartan (Cozaar) 50 MG tablet, Take 1 tablet by mouth Daily., Disp: 90 tablet, Rfl: 3    lurasidone HCl (Latuda) 60 MG tablet tablet, Take 1 tablet by mouth every night at bedtime., Disp: 30 tablet, Rfl: 3    metFORMIN (Glucophage) 500 MG tablet, Take 1 tablet by mouth Daily With Dinner., Disp: 30 tablet, Rfl: 2    metoprolol succinate XL (TOPROL-XL) 50 MG 24 hr tablet, Take 1/2 tablet in the morning and 1 tablet in the evening (Patient taking differently: 1.5 tablets. Take 1/2 tablet in the morning and 1 tablet in the evening), Disp: 135  tablet, Rfl: 3    montelukast (Singulair) 10 MG tablet, Take 1 tablet by mouth Every Night., Disp: 30 tablet, Rfl: 5    Multiple Vitamins-Minerals (MULTIVITAMIN ADULT, MINERALS, PO), Take  by mouth., Disp: , Rfl:     ondansetron ODT (ZOFRAN-ODT) 8 MG disintegrating tablet, Place 1 tablet on the tongue Every 8 (Eight) Hours As Needed for Nausea or Vomiting., Disp: 30 tablet, Rfl: 2    Retin-A 0.025 % cream, apply daily after washing, Disp: 45 g, Rfl: 2    spironolactone (ALDACTONE) 25 MG tablet, Take 1 tablet by mouth 3 (Three) Times a Day., Disp: 90 tablet, Rfl: 5    traZODone (DESYREL) 50 MG tablet, Take 1/2 to 1 tablets by mouth At Night As Needed for Sleep., Disp: 30 tablet, Rfl: 0    vitamin B-12 (CYANOCOBALAMIN) 1000 MCG tablet, Take 1 tablet by mouth Daily., Disp: 90 tablet, Rfl: 3    vitamin D3 (Vitamin D) 125 MCG (5000 UT) capsule capsule, Take 1 capsule by mouth Daily., Disp: 90 capsule, Rfl: 3   Past Medical History:   Diagnosis Date    Alcoholism in remission 02/05/2021    Anxiety disorder 01/05/2021    Asthma     Constipation     Encounter for blood transfusion     H/O psychiatric care     Hypertension     Left anterior cruciate ligament tear     Major depression 02/05/2021    Obesity     Onychomycosis     PONV (postoperative nausea and vomiting)       Physical Exam  Vitals reviewed.   Constitutional:       Appearance: Normal appearance. She is well-developed. She is morbidly obese.   Neck:      Thyroid: No thyroid mass, thyromegaly or thyroid tenderness.   Cardiovascular:      Rate and Rhythm: Normal rate and regular rhythm.      Heart sounds: No murmur heard.     No friction rub. No gallop.   Pulmonary:      Effort: Pulmonary effort is normal.      Breath sounds: Normal breath sounds. No wheezing or rhonchi.   Lymphadenopathy:      Cervical: No cervical adenopathy.   Skin:     General: Skin is warm and dry.   Neurological:      Mental Status: She is alert and oriented to person, place, and time.       Cranial Nerves: No cranial nerve deficit.   Psychiatric:         Mood and Affect: Mood and affect normal.         Behavior: Behavior normal.         Thought Content: Thought content normal. Thought content does not include homicidal or suicidal ideation.         Judgment: Judgment normal.        Result Review :   {The following data was reviewed by CLEO Garza    XR Chest 2 View    Result Date: 10/26/2023   No acute cardiopulmonary findings.     FLORI HAQ MD       Electronically Signed and Approved By: FLORI HAQ MD on 10/26/2023 at 13:44             NM HIDA SCAN WITH PHARMACOLOGICAL INTERVENTION    Result Date: 9/15/2023   Normal examination.      YENIFER BASS MD       Electronically Signed and Approved By: YENIFER BASS MD on 9/15/2023 at 13:06             Common Labs   Common labs          2/1/2023    18:07 4/25/2023    07:52 7/25/2023    08:04   Common Labs   Glucose 114  89  97    BUN 23  15  18    Creatinine 1.10  1.01  0.93    Sodium 139  140  140    Potassium 4.2  3.9  4.0    Chloride 105  109  103    Calcium 9.4  9.9  9.3    Albumin  4.0  4.2    Total Bilirubin  <0.2  0.2    Alkaline Phosphatase  69  78    AST (SGOT)  11  10    ALT (SGPT)  15  12    WBC  10.88  7.97    Hemoglobin  13.6  13.4    Hematocrit  41.0  40.3    Platelets  280  332    Total Cholesterol  159     Triglycerides  87     HDL Cholesterol  39     LDL Cholesterol   104     Hemoglobin A1C  5.60  5.60      VITD   Lab Results   Component Value Date    VQSE21EH 31.4 07/25/2023     VITB12   Lab Results   Component Value Date    ZQPCKCZN27 571 07/25/2023     IRON    Iron   Date Value Ref Range Status   07/25/2023 62 37 - 145 mcg/dL Final     Iron Saturation (TSAT)   Date Value Ref Range Status   07/25/2023 15 (L) 20 - 50 % Final     Transferrin   Date Value Ref Range Status   07/25/2023 279 200 - 360 mg/dL Final     TIBC   Date Value Ref Range Status   07/25/2023 416 298 - 536 mcg/dL Final            Assessment and Plan     Diagnoses and all orders for this visit:    1. Prediabetes (Primary)  Assessment & Plan:  I advised her that Ozempic is not indicated for prediabetes and that she is not a candidate.  A1c is stable, will plan to recheck in 3 months.      2. Class 3 severe obesity with serious comorbidity and body mass index (BMI) of 40.0 to 44.9 in adult, unspecified obesity type  Assessment & Plan:  Patient's (Body mass index is 43.72 kg/m².) indicates that they are morbidly/severely obese (BMI > 40 or > 35 with obesity - related health condition) with health conditions that include hypertension and prediabetes  . Weight is unchanged. BMI  is above average; BMI management plan is completed. We discussed low calorie, low carb based diet program, portion control, increasing exercise, Information on healthy weight added to patient's after visit summary, and information on bariatric surgery provided, see AVS .       3. Primary hypertension  Assessment & Plan:  Hypertension is  above goal today but patient has missed her medication doses .  Continue current treatment regimen.  Weight loss.  Stop smoking.  Continue current medications.  Ambulatory blood pressure monitoring.  Blood pressure will be reassessed in 3 months.      4. Vitamin D deficiency  Assessment & Plan:  She is currently taking vitamin D, will plan to recheck vitamin D level in 3 months.      5. Vitamin B12 deficiency  Assessment & Plan:  She is taking vitamin B12 and folic acid, will plan to recheck levels in 3 months.      6. Need for influenza vaccination  -     Fluzone (or Fluarix & Flulaval for VFC) >6mos    7. Moderate persistent asthma, unspecified whether complicated  Assessment & Plan:  Asthma is improving with treatment.  The patient is experiencing no daytime asthma symptoms. She is experiencing no nighttime asthma symptoms.  Asthma information handout given.  Reduce exposure to inhaled allergens: Stop smoking.  Continue all medications as  prescribed.              Follow Up   Return in about 3 months (around 1/27/2024) for Next scheduled follow up.  Patient was given instructions and counseling regarding her condition or for health maintenance advice. Please see specific information pulled into the AVS if appropriate.     Parts of this note are electronic transcriptions/translations of spoken language to printed text using the Dragon Dictation system.      Sonya Squires, CLEO  10/27/2023

## 2023-10-27 NOTE — ASSESSMENT & PLAN NOTE
Hypertension is  above goal today but patient has missed her medication doses .  Continue current treatment regimen.  Weight loss.  Stop smoking.  Continue current medications.  Ambulatory blood pressure monitoring.  Blood pressure will be reassessed in 3 months.

## 2023-10-31 ENCOUNTER — OFFICE VISIT (OUTPATIENT)
Dept: CARDIOLOGY | Facility: CLINIC | Age: 28
End: 2023-10-31
Payer: COMMERCIAL

## 2023-10-31 VITALS
HEART RATE: 92 BPM | DIASTOLIC BLOOD PRESSURE: 80 MMHG | HEIGHT: 66 IN | SYSTOLIC BLOOD PRESSURE: 128 MMHG | BODY MASS INDEX: 43.07 KG/M2 | WEIGHT: 268 LBS

## 2023-10-31 DIAGNOSIS — I10 PRIMARY HYPERTENSION: ICD-10-CM

## 2023-10-31 DIAGNOSIS — R00.0 TACHYCARDIA: Primary | ICD-10-CM

## 2023-10-31 DIAGNOSIS — R42 DIZZINESS: ICD-10-CM

## 2023-10-31 PROCEDURE — 1159F MED LIST DOCD IN RCRD: CPT | Performed by: INTERNAL MEDICINE

## 2023-10-31 PROCEDURE — 3079F DIAST BP 80-89 MM HG: CPT | Performed by: INTERNAL MEDICINE

## 2023-10-31 PROCEDURE — 1160F RVW MEDS BY RX/DR IN RCRD: CPT | Performed by: INTERNAL MEDICINE

## 2023-10-31 PROCEDURE — 3074F SYST BP LT 130 MM HG: CPT | Performed by: INTERNAL MEDICINE

## 2023-10-31 PROCEDURE — 99214 OFFICE O/P EST MOD 30 MIN: CPT | Performed by: INTERNAL MEDICINE

## 2023-10-31 NOTE — ASSESSMENT & PLAN NOTE
Previous Holter monitor study showed no arrhythmias or ectopic beats but did show episodes of sinus tachycardia.  She is on beta-blockers for tachycardia and high blood pressure.  However she is currently reporting significant hair loss.  We will decrease the dose of metoprolol succinate to 25 mg daily.

## 2023-10-31 NOTE — PROGRESS NOTES
CARDIOLOGY FOLLOW-UP PROGRESS NOTE        Chief Complaint  Follow-up, Palpitations, and Alopecia    Subjective            Zhane Traylor presents to [facility] CARDIOLOGY  History of Present Illness    Ms Traylor is here for routine follow-up visit.  She was previously following up with Dr. Childress.    She was previously seen and evaluated for dizziness and palpitations.  Work-up including echocardiogram, Holter monitor study and tilt table study were unremarkable.  She is currently on beta-blockers for symptomatic benefits and tachycardia.  Today she reports no major symptoms.  Dizziness episodes are almost completely subsided.  She still gets palpitations, which are very rare.  She does report hair loss for the past several months.      Past History:    Medical History:  Past Medical History:   Diagnosis Date    Alcoholism in remission 02/05/2021    Anxiety disorder 01/05/2021    Asthma     Constipation     Encounter for blood transfusion     H/O psychiatric care     Hypertension     Left anterior cruciate ligament tear     Major depression 02/05/2021    Obesity     Onychomycosis     PONV (postoperative nausea and vomiting)        Surgical History: has a past surgical history that includes Dilation and curettage of uterus; [place] tooth extraction (2012); and Anterior cruciate ligament repair (Left, 2/21/2022).     Family History: family history includes Atrial fibrillation in her father; Breast cancer in her paternal aunt, paternal grandmother, and paternal great-grandmother; Heart failure in her maternal grandfather, maternal grandmother, paternal grandfather, and paternal grandmother.     Social History: reports that she has been smoking cigarettes. She started smoking about 13 years ago. She has a 4.50 pack-year smoking history. She has been exposed to tobacco smoke. She has never used smokeless tobacco. She reports that she does not currently use alcohol. She reports that she does not use  drugs.    Allergies: Patient has no known allergies.    Current Outpatient Medications on File Prior to Visit   Medication Sig    amLODIPine (NORVASC) 5 MG tablet Take 1 tablet by mouth Daily.    cetirizine (zyrTEC) 10 MG tablet Zyrtec 10 mg oral tablet take 1 tablet (10 mg) by oral route once daily   Active    diclofenac (VOLTAREN) 75 MG EC tablet 1 tablet by mouth twice a day    Etonogestrel (Nexplanon) 68 MG implant subdermal implant Inject 1 each into the appropriate area of the skin as directed by provider.    famotidine (Pepcid) 20 MG tablet Take 1 tablet by mouth 2 (Two) Times a Day As Needed for Indigestion.    Fluticasone-Salmeterol (ADVAIR/WIXELA) 250-50 MCG/ACT DISKUS Inhale 1 puff 2 (Two) Times a Day. For asthma. Rinse mouth after use    folic acid (FOLVITE) 1 MG tablet Take 1 tablet by mouth Daily.    gabapentin (NEURONTIN) 300 MG capsule Take 1 capsule by mouth 3 times a day.    lamoTRIgine (LaMICtal) 100 MG tablet Take 1 tablet by mouth 2 (Two) Times a Day.    levalbuterol (XOPENEX HFA) 45 MCG/ACT inhaler Inhale 1-2 puffs Every 4 (Four) Hours As Needed for Wheezing or Shortness of Air.    levalbuterol (Xopenex) 1.25 MG/3ML nebulizer solution Take 1 ampule by nebulization Every 4 (Four) Hours As Needed for Wheezing or Shortness of Air.    LORazepam ER (Loreev XR) 3 MG capsule extended-release 24 hour sprinkle Take 1 capsule by mouth Daily.    losartan (Cozaar) 50 MG tablet Take 1 tablet by mouth Daily.    lurasidone HCl (Latuda) 60 MG tablet tablet Take 1 tablet by mouth every night at bedtime.    metFORMIN (Glucophage) 500 MG tablet Take 1 tablet by mouth Daily With Dinner.    metoprolol succinate XL (TOPROL-XL) 50 MG 24 hr tablet Take 1/2 tablet in the morning and 1 tablet in the evening (Patient taking differently: Take 1 tablet by mouth Daily.)    montelukast (Singulair) 10 MG tablet Take 1 tablet by mouth Every Night.    Multiple Vitamins-Minerals (MULTIVITAMIN ADULT, MINERALS, PO) Take  by mouth.  "   ondansetron ODT (ZOFRAN-ODT) 8 MG disintegrating tablet Place 1 tablet on the tongue Every 8 (Eight) Hours As Needed for Nausea or Vomiting.    Retin-A 0.025 % cream apply daily after washing    spironolactone (ALDACTONE) 25 MG tablet Take 1 tablet by mouth 3 (Three) Times a Day.    traZODone (DESYREL) 50 MG tablet Take 1/2 to 1 tablets by mouth At Night As Needed for Sleep.    vitamin B-12 (CYANOCOBALAMIN) 1000 MCG tablet Take 1 tablet by mouth Daily.    vitamin D3 (Vitamin D) 125 MCG (5000 UT) capsule capsule Take 1 capsule by mouth Daily.     No current facility-administered medications on file prior to visit.          Review of Systems   Respiratory:  Negative for cough, shortness of breath and wheezing.    Cardiovascular:  Positive for palpitations. Negative for chest pain and leg swelling.   Gastrointestinal:  Negative for nausea and vomiting.   Neurological:  Negative for dizziness and syncope.        Objective     /80   Pulse 92   Ht 167.6 cm (66\")   Wt 122 kg (268 lb)   BMI 43.26 kg/m²       Physical Exam    General : Alert, awake, no acute distress  Neck : Supple, no carotid bruit, no jugular venous distention  CVS : Regular rate and rhythm, no murmur, rubs or gallops  Lungs: Clear to auscultation bilaterally, no crackles or rhonchi  Abdomen: Soft, nontender, bowel sounds heard in all 4 quadrants  Extremities: Warm, well-perfused, no pedal edema    Result Review :     The following data was reviewed by: Phuc Dupont MD on 10/31/2023:    CMP          2/1/2023    18:07 4/25/2023    07:52 7/25/2023    08:04   CMP   Glucose 114  89  97    BUN 23  15  18    Creatinine 1.10  1.01  0.93    EGFR 71.2  78.9  86.6    Sodium 139  140  140    Potassium 4.2  3.9  4.0    Chloride 105  109  103    Calcium 9.4  9.9  9.3    Total Protein  6.9  6.9    Albumin  4.0  4.2    Globulin  2.9  2.7    Total Bilirubin  <0.2  0.2    Alkaline Phosphatase  69  78    AST (SGOT)  11  10    ALT (SGPT)  15  12  "   Albumin/Globulin Ratio  1.4  1.6    BUN/Creatinine Ratio 20.9  14.9  19.4    Anion Gap 9.0  8.5  12.0      CBC          4/25/2023    07:52 7/25/2023    08:04   CBC   WBC 10.88  7.97    RBC 4.74  4.72    Hemoglobin 13.6  13.4    Hematocrit 41.0  40.3    MCV 86.5  85.4    MCH 28.7  28.4    MCHC 33.2  33.3    RDW 12.2  12.5    Platelets 280  332      TSH          4/25/2023    07:52   TSH   TSH 2.810      Lipid Panel          4/25/2023    07:52   Lipid Panel   Total Cholesterol 159    Triglycerides 87    HDL Cholesterol 39    VLDL Cholesterol 16    LDL Cholesterol  104    LDL/HDL Ratio 2.63           Data reviewed: Cardiology studies        Results for orders placed in visit on 02/23/23    Adult Transthoracic Echo Complete w/ Color, Spectral and Contrast if necessary per protocol    Interpretation Summary  Normal left ventricular systolic function with an estimated ejection fraction of 60-65%.  No regional wall motion abnormalities were observed.  Left ventricular diastolic function is consistent with impaired relaxation (grade I diastolic dysfunction).  No hemodynamically significant valvular pathology.  Right ventricular systolic pressure could not be accurately estimated due to an insufficient TR velocity profile.                   Assessment and Plan        Diagnoses and all orders for this visit:    1. Tachycardia (Primary)  Assessment & Plan:  Previous Holter monitor study showed no arrhythmias or ectopic beats but did show episodes of sinus tachycardia.  She is on beta-blockers for tachycardia and high blood pressure.  However she is currently reporting significant hair loss.  We will decrease the dose of metoprolol succinate to 25 mg daily.      2. Primary hypertension  Assessment & Plan:  Blood pressure reasonably well controlled.  Continue spironolactone, amlodipine and losartan at the current dose.  We are decreasing dose of metoprolol due to hair loss.      3. Dizziness  Assessment & Plan:  Currently  subsided.  Previous tilt table study was unremarkable.  Counseled again regarding adequate hydration and other lifestyle modifications.                Follow Up     Return in about 6 months (around 4/30/2024) for Next scheduled follow up, with Padmaja GALLO.    Patient was given instructions and counseling regarding her condition or for health maintenance advice. Please see specific information pulled into the AVS if appropriate.

## 2023-10-31 NOTE — ASSESSMENT & PLAN NOTE
Blood pressure reasonably well controlled.  Continue spironolactone, amlodipine and losartan at the current dose.  We are decreasing dose of metoprolol due to hair loss.

## 2023-10-31 NOTE — ASSESSMENT & PLAN NOTE
Currently subsided.  Previous tilt table study was unremarkable.  Counseled again regarding adequate hydration and other lifestyle modifications.

## 2023-12-01 DIAGNOSIS — J45.21 MILD INTERMITTENT ASTHMA WITH EXACERBATION: ICD-10-CM

## 2023-12-01 RX ORDER — FLUTICASONE PROPIONATE AND SALMETEROL 250; 50 UG/1; UG/1
1 POWDER RESPIRATORY (INHALATION)
Qty: 60 EACH | Refills: 0 | Status: SHIPPED | OUTPATIENT
Start: 2023-12-01

## 2023-12-19 RX ORDER — DILTIAZEM HYDROCHLORIDE 180 MG/1
180 CAPSULE, EXTENDED RELEASE ORAL DAILY
Qty: 90 CAPSULE | Refills: 3 | Status: SHIPPED | OUTPATIENT
Start: 2023-12-19

## 2023-12-28 DIAGNOSIS — G47.00 INSOMNIA, UNSPECIFIED TYPE: ICD-10-CM

## 2023-12-28 RX ORDER — TRAZODONE HYDROCHLORIDE 50 MG/1
25-50 TABLET ORAL NIGHTLY PRN
Qty: 30 TABLET | Refills: 0 | Status: CANCELLED | OUTPATIENT
Start: 2023-12-28

## 2023-12-28 RX ORDER — TRAZODONE HYDROCHLORIDE 50 MG/1
25-50 TABLET ORAL NIGHTLY PRN
Qty: 30 TABLET | Refills: 0 | Status: SHIPPED | OUTPATIENT
Start: 2023-12-28

## 2024-01-12 RX ORDER — DILTIAZEM HYDROCHLORIDE 240 MG/1
240 CAPSULE, EXTENDED RELEASE ORAL DAILY
Qty: 90 CAPSULE | Refills: 1 | Status: SHIPPED | OUTPATIENT
Start: 2024-01-12

## 2024-02-07 PROCEDURE — 87086 URINE CULTURE/COLONY COUNT: CPT | Performed by: PHYSICIAN ASSISTANT

## 2024-03-18 ENCOUNTER — TRANSCRIBE ORDERS (OUTPATIENT)
Dept: ADMINISTRATIVE | Facility: HOSPITAL | Age: 29
End: 2024-03-18
Payer: COMMERCIAL

## 2024-03-21 ENCOUNTER — LAB (OUTPATIENT)
Dept: LAB | Facility: HOSPITAL | Age: 29
End: 2024-03-21
Payer: COMMERCIAL

## 2024-03-21 ENCOUNTER — TRANSCRIBE ORDERS (OUTPATIENT)
Dept: ADMINISTRATIVE | Facility: HOSPITAL | Age: 29
End: 2024-03-21
Payer: COMMERCIAL

## 2024-03-21 DIAGNOSIS — M79.671 PAIN IN BOTH FEET: Primary | ICD-10-CM

## 2024-03-21 DIAGNOSIS — M79.671 PAIN IN BOTH FEET: ICD-10-CM

## 2024-03-21 DIAGNOSIS — M19.071: ICD-10-CM

## 2024-03-21 DIAGNOSIS — M79.672 PAIN IN BOTH FEET: ICD-10-CM

## 2024-03-21 DIAGNOSIS — M79.672 PAIN IN BOTH FEET: Primary | ICD-10-CM

## 2024-03-21 LAB — CHROMATIN AB SERPL-ACNC: <10 IU/ML (ref 0–14)

## 2024-03-21 PROCEDURE — 86038 ANTINUCLEAR ANTIBODIES: CPT

## 2024-03-21 PROCEDURE — 86431 RHEUMATOID FACTOR QUANT: CPT

## 2024-03-21 PROCEDURE — 36415 COLL VENOUS BLD VENIPUNCTURE: CPT

## 2024-03-22 LAB — ANA SER QL: NEGATIVE

## 2024-03-27 ENCOUNTER — TELEPHONE (OUTPATIENT)
Dept: OBSTETRICS AND GYNECOLOGY | Facility: CLINIC | Age: 29
End: 2024-03-27
Payer: COMMERCIAL

## 2024-03-28 ENCOUNTER — OFFICE VISIT (OUTPATIENT)
Dept: ORTHOPEDIC SURGERY | Facility: CLINIC | Age: 29
End: 2024-03-28
Payer: COMMERCIAL

## 2024-03-28 VITALS
BODY MASS INDEX: 43.07 KG/M2 | WEIGHT: 268 LBS | HEART RATE: 100 BPM | DIASTOLIC BLOOD PRESSURE: 82 MMHG | HEIGHT: 66 IN | OXYGEN SATURATION: 97 % | SYSTOLIC BLOOD PRESSURE: 115 MMHG

## 2024-03-28 DIAGNOSIS — E66.01 OBESITY, MORBID, BMI 40.0-49.9: ICD-10-CM

## 2024-03-28 DIAGNOSIS — M25.561 RIGHT KNEE PAIN, UNSPECIFIED CHRONICITY: Primary | ICD-10-CM

## 2024-03-28 DIAGNOSIS — M25.461 EFFUSION OF RIGHT KNEE: ICD-10-CM

## 2024-03-28 DIAGNOSIS — S89.91XA INJURY OF RIGHT KNEE, INITIAL ENCOUNTER: ICD-10-CM

## 2024-03-28 RX ORDER — IBUPROFEN 800 MG/1
800 TABLET ORAL 3 TIMES DAILY
Qty: 90 TABLET | Refills: 0 | Status: SHIPPED | OUTPATIENT
Start: 2024-03-28

## 2024-03-28 NOTE — PROGRESS NOTES
"Chief Complaint  Follow-up of the Right Knee     Subjective      Zhane Traylor presents to [facility] for follow up evaluation of the right knee. The patient was last seen in . She states she went to get in her dads truck and she felt like her right knee subluxed. She locates pain to the anterior medial knee.       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     Review of Systems     Constitutional: Denies fevers, chills, weight loss  Cardiovascular: Denies chest pain, shortness of breath  Skin: Denies rashes, acute skin changes  Neurologic: Denies headache, loss of consciousness  MSK: Right knee pain      Vital Signs:   /82   Pulse 100   Ht 167.6 cm (66\")   Wt 122 kg (268 lb)   SpO2 97%   BMI 43.26 kg/m²          Physical Exam  General: Alert. No acute distress    Ortho Exam      Right knee- full extension. Knee Extensor Mechanism  intact. Flexion 100. Stable to varus/valgus stress. Stable to anterior/posterior drawer. Negative Lachman's. Positive EHL, FHL, GS and TA. Sensation intact to all 5 nerves of the foot. Positive pulses. Tender to the medial " patella. Mild swelling no effusion. Lateral tilt to the patella.     Procedures    X-Ray Report:  Right knee X-Ray  Indication: Evaluation of right knee pain  AP/Lateral, Standing, and [place] view(s)  Findings: lateral subluxation to the patella. Mild Degenerative changes to the medial compartment. Large effusion  Prior studies available for comparison: yes       Imaging Results (Most Recent)       Procedure Component Value Units Date/Time    XR Knee 3 View Right [[id number]] Resulted: 03/28/24 1443     Updated: 03/28/24 1444             Result Review :       No results found.           Assessment and Plan     Diagnoses and all orders for this visit:    1. Right knee pain, unspecified chronicity (Primary)  -     XR Knee 3 View Right    2. Injury of right knee, initial encounter    3. Effusion of right knee    4. Obesity, morbid, BMI 40.0-49.9        Discussed the treatment plan with the patient.  I reviewed the x-rays that were obtained today with the patient. Plan for MRI of the right knee to evaluate for patella dislocation. Lateral J brace given today. The patient expressed understanding and wished to proceed. Prescription for Ibuprofen given today.     Educated on risk of elevated BMI.  Discussed options for weight loss/decreasing BMI prior to procedure including dietician consult, weight loss options and exercise program. and Call or return if worsening symptoms.    Follow Up     MRI results      Patient was given instructions and counseling regarding her condition or for health maintenance advice. Please see specific information pulled into the AVS if appropriate.     Scribed for Troy Hsu MD by Luisa Wesley.  03/28/24   14:44 EDT    I have personally performed the services described in this document as scribed by the above individual and it is both accurate and complete. Troy Hsu MD 03/31/24

## 2024-03-29 ENCOUNTER — HOSPITAL ENCOUNTER (OUTPATIENT)
Dept: MRI IMAGING | Facility: HOSPITAL | Age: 29
Discharge: HOME | End: 2024-03-29
Payer: COMMERCIAL

## 2024-03-29 PROCEDURE — 73721 MRI JNT OF LWR EXTRE W/O DYE: CPT

## 2024-04-04 ENCOUNTER — TELEPHONE (OUTPATIENT)
Dept: CARDIOLOGY | Facility: CLINIC | Age: 29
End: 2024-04-04
Payer: COMMERCIAL

## 2024-04-04 ENCOUNTER — OFFICE VISIT (OUTPATIENT)
Dept: ORTHOPEDIC SURGERY | Facility: CLINIC | Age: 29
End: 2024-04-04
Payer: COMMERCIAL

## 2024-04-04 ENCOUNTER — PREP FOR SURGERY (OUTPATIENT)
Dept: OTHER | Facility: HOSPITAL | Age: 29
End: 2024-04-04
Payer: COMMERCIAL

## 2024-04-04 VITALS
BODY MASS INDEX: 43.07 KG/M2 | HEART RATE: 98 BPM | DIASTOLIC BLOOD PRESSURE: 83 MMHG | WEIGHT: 268 LBS | OXYGEN SATURATION: 96 % | SYSTOLIC BLOOD PRESSURE: 116 MMHG | HEIGHT: 66 IN

## 2024-04-04 DIAGNOSIS — S83.511D RUPTURE OF ANTERIOR CRUCIATE LIGAMENT OF RIGHT KNEE, SUBSEQUENT ENCOUNTER: Primary | ICD-10-CM

## 2024-04-04 DIAGNOSIS — M25.561 RIGHT KNEE PAIN, UNSPECIFIED CHRONICITY: Primary | ICD-10-CM

## 2024-04-04 DIAGNOSIS — S83.511D RUPTURE OF ANTERIOR CRUCIATE LIGAMENT OF RIGHT KNEE, SUBSEQUENT ENCOUNTER: ICD-10-CM

## 2024-04-04 DIAGNOSIS — E66.01 OBESITY, MORBID, BMI 40.0-49.9: ICD-10-CM

## 2024-04-04 DIAGNOSIS — M25.461 EFFUSION OF RIGHT KNEE: ICD-10-CM

## 2024-04-04 DIAGNOSIS — S89.91XA INJURY OF RIGHT KNEE, INITIAL ENCOUNTER: ICD-10-CM

## 2024-04-04 PROBLEM — S83.511A RUPTURE OF ANTERIOR CRUCIATE LIGAMENT OF RIGHT KNEE: Status: ACTIVE | Noted: 2024-04-04

## 2024-04-04 RX ORDER — CEFAZOLIN SODIUM IN 0.9 % NACL 3 G/100 ML
3 INTRAVENOUS SOLUTION, PIGGYBACK (ML) INTRAVENOUS ONCE
OUTPATIENT
Start: 2024-04-04 | End: 2024-04-04

## 2024-04-04 NOTE — TELEPHONE ENCOUNTER
Procedure: RT KNEE ACL REPAIR    Medication Directive: NA    PMH: TACHYCARDIA, HTN, DIZZINESS    Last Seen: 10/31/23    ECHO: 02/23/23    Normal left ventricular systolic function with an estimated ejection fraction of 60-65%.  No regional wall motion abnormalities were observed.  Left ventricular diastolic function is consistent with impaired relaxation (grade I diastolic dysfunction).  No hemodynamically significant valvular pathology.  Right ventricular systolic pressure could not be accurately estimated due to an insufficient TR velocity profile.

## 2024-04-04 NOTE — H&P (VIEW-ONLY)
"Chief Complaint  Follow-up of the Right Knee     Subjective      Zhane Traylor presents to [facility] for follow up of the right knee.  She had a MRI on 3/29/24 and is here to review.  She states she went to get in her dads truck and she felt like her right knee subluxed. She locates pain to the anterior medial knee. She has been using the J brace. She had a left knee arthroscopic ACL reconstruction with allograft, 2022.     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     Review of Systems     Constitutional: Denies fevers, chills, weight loss  Cardiovascular: Denies chest pain, shortness of breath  Skin: Denies rashes, acute skin changes  Neurologic: Denies headache, loss of consciousness        Vital Signs:   /83   Pulse 98   Ht 167.6 cm (66\")   Wt 122 kg (268 lb)   SpO2 96%   BMI 43.26 kg/m²          Physical Exam  General: Alert. No acute distress    Ortho Exam      Right knee- full extension. Knee Extensor Mechanism  intact. Flexion 100. Stable to varus/valgus stress. Stable to anterior/posterior drawer. Negative Lachman's. Positive EHL, FHL, GS " and TA. Sensation intact to all 5 nerves of the foot. Positive pulses. Tender to the medial patella. Mild swelling no effusion. Lateral tilt to the patella.     Procedures        Imaging Results (Most Recent)       None             Result Review :       MRI Knee Right Without Contrast    Result Date: 4/2/2024  Narrative: MRI KNEE RIGHT  WO CONTRAST-  Date of Exam: 3/29/2024 5:00 PM  Indication: Right knee pain; M25.561-Pain in right knee; S89.91XA-Unspecified injury of right lower leg, initial encounter; M25.461-Effusion, right knee.  Comparison: None available.  Technique:  Routine multiplanar/multisequence images of the right knee were obtained without contrast administration.   Findings: There is mild T2 high signal noted in the posterolateral aspect of the tibial plateau consistent with an osseous contusion. Similar finding is noted in the posteromedial aspect of the tibial plateau. At this site, there is also a small nondisplaced subchondral fracture. The patella is subluxed laterally 0.7 cm.  The proximal ACL is completely torn. The PCL is intact. There is T2 high signal interposed between the medial meniscal posterior horn and capsule, consistent with a meniscocapsular separation. The lateral meniscus appears unremarkable.  The medial collateral ligament, lateral collateral ligament complex, lateral patellar retinaculum and extensor mechanism appear intact. There is a partial-thickness tear of the medial patellar retinaculum at the patellar insertion. There are complete tears involving the posterior aspects of the medial patellar retinaculum and medial patellofemoral ligament.  A large knee joint effusion is noted. Cartilage in the joint is intact. No loose body is seen.  No popliteal cyst is seen.      Impression: Impression: 1.  Complete tear of the ACL 2.  Tiny nondisplaced subchondral fracture of the posteromedial tibial plateau 3.  Small osseous contusion in the posterolateral tibial plateau. 4.   Meniscocapsular separation along the posterior aspect of the medial meniscal posterior horn. 5.  Tears of the medial patellar retinaculum and medial patellofemoral ligament 6.  Large knee joint effusion 7.  Lateral subluxation of the patella.    Electronically Signed By-Afshin Rose MD On:4/2/2024 5:03 PM      XR Knee 3 View Right    Result Date: 3/28/2024  Narrative: X-Ray Report: Right knee X-Ray Indication: Evaluation of right knee pain AP/Lateral, Standing, and [place] view(s) Findings: lateral subluxation to the patella. Mild Degenerative changes to the medial compartment. Large effusion Prior studies available for comparison: yes             Assessment and Plan     Diagnoses and all orders for this visit:    1. Right knee pain, unspecified chronicity (Primary)    2. Injury of right knee, initial encounter    3. Effusion of right knee    4. Obesity, morbid, BMI 40.0-49.9    5. Rupture of anterior cruciate ligament of right knee, subsequent encounter        Discussed the treatment plan with the patient. I reviewed the MRI results with the patient.     Prescribed physical therapy for pre-hab physical therapy.     Discussed the treatment options with the patient, operative vs non-operative.     The patient expressed understanding and wished to proceed with a right knee ACL reconstruction with allograft and possible Possible Medial Patellofemoral Ligament reconstruction, Possible Meniscus Repair     Educated on risk of elevated BMI.  Discussed options for weight loss/decreasing BMI prior to procedure including dietician consult, weight loss options and exercise program., Discussed surgery., Risks/benefits discussed with patient including, but not limited to: infection, bleeding, neurovascular damage, malunion, nonunion, aesthetic deformity, need for further surgery, and death., Surgery pamphlet given., and Call or return if worsening symptoms.    Follow Up     2 weeks postoperatively       Patient was given  instructions and counseling regarding her condition or for health maintenance advice. Please see specific information pulled into the AVS if appropriate.     Scribed for Troy Hsu MD by Leydi Frias MA.  04/04/24   09:05 EDT    I have personally performed the services described in this document as scribed by the above individual and it is both accurate and complete. Troy Hsu MD 04/05/24

## 2024-04-04 NOTE — PROGRESS NOTES
"Chief Complaint  Follow-up of the Right Knee     Subjective      Zhane Traylor presents to [facility] for follow up of the right knee.  She had a MRI on 3/29/24 and is here to review.  She states she went to get in her dads truck and she felt like her right knee subluxed. She locates pain to the anterior medial knee. She has been using the J brace. She had a left knee arthroscopic ACL reconstruction with allograft, 2022.     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     Review of Systems     Constitutional: Denies fevers, chills, weight loss  Cardiovascular: Denies chest pain, shortness of breath  Skin: Denies rashes, acute skin changes  Neurologic: Denies headache, loss of consciousness        Vital Signs:   /83   Pulse 98   Ht 167.6 cm (66\")   Wt 122 kg (268 lb)   SpO2 96%   BMI 43.26 kg/m²          Physical Exam  General: Alert. No acute distress    Ortho Exam      Right knee- full extension. Knee Extensor Mechanism  intact. Flexion 100. Stable to varus/valgus stress. Stable to anterior/posterior drawer. Negative Lachman's. Positive EHL, FHL, GS " and TA. Sensation intact to all 5 nerves of the foot. Positive pulses. Tender to the medial patella. Mild swelling no effusion. Lateral tilt to the patella.     Procedures        Imaging Results (Most Recent)       None             Result Review :       MRI Knee Right Without Contrast    Result Date: 4/2/2024  Narrative: MRI KNEE RIGHT  WO CONTRAST-  Date of Exam: 3/29/2024 5:00 PM  Indication: Right knee pain; M25.561-Pain in right knee; S89.91XA-Unspecified injury of right lower leg, initial encounter; M25.461-Effusion, right knee.  Comparison: None available.  Technique:  Routine multiplanar/multisequence images of the right knee were obtained without contrast administration.   Findings: There is mild T2 high signal noted in the posterolateral aspect of the tibial plateau consistent with an osseous contusion. Similar finding is noted in the posteromedial aspect of the tibial plateau. At this site, there is also a small nondisplaced subchondral fracture. The patella is subluxed laterally 0.7 cm.  The proximal ACL is completely torn. The PCL is intact. There is T2 high signal interposed between the medial meniscal posterior horn and capsule, consistent with a meniscocapsular separation. The lateral meniscus appears unremarkable.  The medial collateral ligament, lateral collateral ligament complex, lateral patellar retinaculum and extensor mechanism appear intact. There is a partial-thickness tear of the medial patellar retinaculum at the patellar insertion. There are complete tears involving the posterior aspects of the medial patellar retinaculum and medial patellofemoral ligament.  A large knee joint effusion is noted. Cartilage in the joint is intact. No loose body is seen.  No popliteal cyst is seen.      Impression: Impression: 1.  Complete tear of the ACL 2.  Tiny nondisplaced subchondral fracture of the posteromedial tibial plateau 3.  Small osseous contusion in the posterolateral tibial plateau. 4.   Meniscocapsular separation along the posterior aspect of the medial meniscal posterior horn. 5.  Tears of the medial patellar retinaculum and medial patellofemoral ligament 6.  Large knee joint effusion 7.  Lateral subluxation of the patella.    Electronically Signed By-Afshin Rose MD On:4/2/2024 5:03 PM      XR Knee 3 View Right    Result Date: 3/28/2024  Narrative: X-Ray Report: Right knee X-Ray Indication: Evaluation of right knee pain AP/Lateral, Standing, and [place] view(s) Findings: lateral subluxation to the patella. Mild Degenerative changes to the medial compartment. Large effusion Prior studies available for comparison: yes             Assessment and Plan     Diagnoses and all orders for this visit:    1. Right knee pain, unspecified chronicity (Primary)    2. Injury of right knee, initial encounter    3. Effusion of right knee    4. Obesity, morbid, BMI 40.0-49.9    5. Rupture of anterior cruciate ligament of right knee, subsequent encounter        Discussed the treatment plan with the patient. I reviewed the MRI results with the patient.     Prescribed physical therapy for pre-hab physical therapy.     Discussed the treatment options with the patient, operative vs non-operative.     The patient expressed understanding and wished to proceed with a right knee ACL reconstruction with allograft and possible Possible Medial Patellofemoral Ligament reconstruction, Possible Meniscus Repair     Educated on risk of elevated BMI.  Discussed options for weight loss/decreasing BMI prior to procedure including dietician consult, weight loss options and exercise program., Discussed surgery., Risks/benefits discussed with patient including, but not limited to: infection, bleeding, neurovascular damage, malunion, nonunion, aesthetic deformity, need for further surgery, and death., Surgery pamphlet given., and Call or return if worsening symptoms.    Follow Up     2 weeks postoperatively       Patient was given  instructions and counseling regarding her condition or for health maintenance advice. Please see specific information pulled into the AVS if appropriate.     Scribed for Troy Hsu MD by Leydi Frias MA.  04/04/24   09:05 EDT    I have personally performed the services described in this document as scribed by the above individual and it is both accurate and complete. Troy Hsu MD 04/05/24

## 2024-04-04 NOTE — TELEPHONE ENCOUNTER
The [facility] received a fax that requires your attention. The document has been indexed to the patient’s chart for your review.      Reason for sending: EXTERNAL MEDICAL RECORD NOTIFICATION     Documents Description: PHYS ORD-[facility] ORTHO CARDIAC CLEARANCE REQ-4.4.24    Name of Sender: [facility]     Date Indexed: 4.4.24

## 2024-04-10 NOTE — PRE-PROCEDURE INSTRUCTIONS
PATIENT INSTRUCTED TO BE:    - NPO AFTER MIDNIGHT EXCEPT CAN HAVE CLEAR LIQUIDS 2 HOURS PRIOR TO SURGERY ARRIVAL TIME     - TO HOLD ALL VITAMINS, SUPPLEMENTS, NSAIDS FOR ONE WEEK PRIOR TO THEIR SURGICAL PROCEDURE    - INSTRUCTED PT TO USE SURGICAL SOAP 1 TIME THE NIGHT PRIOR TO SURGERY OR THE AM OF SURGERY.   USE SOAP FROM NECK TO TOES AVOID THEIR FACE, HAIR, AND PRIVATE PARTS. INSTRUCTED NO LOTIONS, JEWELRY, PIERCINGS, OR DEODORANT DAY OF SURGERY        - INSTRUCTED TO TAKE THE FOLLOWING MEDICATIONS THE DAY OF SURGERY:   Lorazepam, Advair   If needed Zopenex    PATIENT VERBALIZED UNDERSTANDING

## 2024-04-15 ENCOUNTER — APPOINTMENT (OUTPATIENT)
Dept: GENERAL RADIOLOGY | Facility: HOSPITAL | Age: 29
End: 2024-04-15
Payer: COMMERCIAL

## 2024-04-15 ENCOUNTER — ANESTHESIA (OUTPATIENT)
Dept: PERIOP | Facility: HOSPITAL | Age: 29
End: 2024-04-15
Payer: COMMERCIAL

## 2024-04-15 ENCOUNTER — ANESTHESIA EVENT (OUTPATIENT)
Dept: PERIOP | Facility: HOSPITAL | Age: 29
End: 2024-04-15
Payer: COMMERCIAL

## 2024-04-15 ENCOUNTER — HOSPITAL ENCOUNTER (OUTPATIENT)
Facility: HOSPITAL | Age: 29
Discharge: HOME | End: 2024-04-15
Attending: ORTHOPAEDIC SURGERY | Admitting: ORTHOPAEDIC SURGERY
Payer: COMMERCIAL

## 2024-04-15 ENCOUNTER — ANESTHESIA EVENT CONVERTED (OUTPATIENT)
Dept: ANESTHESIOLOGY | Facility: HOSPITAL | Age: 29
End: 2024-04-15
Payer: COMMERCIAL

## 2024-04-15 VITALS
DIASTOLIC BLOOD PRESSURE: 87 MMHG | TEMPERATURE: 98.4 F | HEIGHT: 66 IN | WEIGHT: 272.49 LBS | HEART RATE: 83 BPM | RESPIRATION RATE: 18 BRPM | SYSTOLIC BLOOD PRESSURE: 115 MMHG | OXYGEN SATURATION: 93 % | BODY MASS INDEX: 43.79 KG/M2

## 2024-04-15 DIAGNOSIS — S83.511D RUPTURE OF ANTERIOR CRUCIATE LIGAMENT OF RIGHT KNEE, SUBSEQUENT ENCOUNTER: ICD-10-CM

## 2024-04-15 DIAGNOSIS — S83.511A RUPTURE OF ANTERIOR CRUCIATE LIGAMENT OF RIGHT KNEE, INITIAL ENCOUNTER: Primary | ICD-10-CM

## 2024-04-15 LAB — B-HCG UR QL: NEGATIVE

## 2024-04-15 PROCEDURE — 25010000002 DEXAMETHASONE PER 1 MG: Performed by: NURSE ANESTHETIST, CERTIFIED REGISTERED

## 2024-04-15 PROCEDURE — C1713 ANCHOR/SCREW BN/BN,TIS/BN: HCPCS | Performed by: ORTHOPAEDIC SURGERY

## 2024-04-15 PROCEDURE — 76000 FLUOROSCOPY <1 HR PHYS/QHP: CPT

## 2024-04-15 PROCEDURE — 29888 ARTHRS AID ACL RPR/AGMNTJ: CPT | Performed by: ORTHOPAEDIC SURGERY

## 2024-04-15 PROCEDURE — 81025 URINE PREGNANCY TEST: CPT | Performed by: ANESTHESIOLOGY

## 2024-04-15 PROCEDURE — 25010000002 MIDAZOLAM PER 1MG: Performed by: ANESTHESIOLOGY

## 2024-04-15 PROCEDURE — 25810000003 LACTATED RINGERS PER 1000 ML: Performed by: ANESTHESIOLOGY

## 2024-04-15 PROCEDURE — 25010000002 FENTANYL CITRATE (PF) 50 MCG/ML SOLUTION: Performed by: NURSE ANESTHETIST, CERTIFIED REGISTERED

## 2024-04-15 PROCEDURE — 25010000002 DEXAMETHASONE PER 1 MG: Performed by: ANESTHESIOLOGY

## 2024-04-15 PROCEDURE — 25010000002 CEFAZOLIN 3 G RECONSTITUTED SOLUTION: Performed by: ORTHOPAEDIC SURGERY

## 2024-04-15 PROCEDURE — 25010000002 BUPIVACAINE (PF) 0.5 % SOLUTION: Performed by: ANESTHESIOLOGY

## 2024-04-15 PROCEDURE — 25010000002 PROPOFOL 10 MG/ML EMULSION: Performed by: NURSE ANESTHETIST, CERTIFIED REGISTERED

## 2024-04-15 PROCEDURE — 25010000002 ONDANSETRON PER 1 MG: Performed by: NURSE ANESTHETIST, CERTIFIED REGISTERED

## 2024-04-15 PROCEDURE — 25010000002 HYDROMORPHONE 1 MG/ML SOLUTION: Performed by: NURSE ANESTHETIST, CERTIFIED REGISTERED

## 2024-04-15 DEVICE — TNDN SPEEDGRFT 2STRND PRESUT W/TIGHTROPE: Type: IMPLANTABLE DEVICE | Site: KNEE | Status: FUNCTIONAL

## 2024-04-15 DEVICE — SYS IMP ACL SWIVELOCK 2NDARY/FIX SUTANCH/BIOCOMP 4.75X19.1MM: Type: IMPLANTABLE DEVICE | Site: KNEE | Status: FUNCTIONAL

## 2024-04-15 DEVICE — SUTR TIGERSTICK SUTR PASS NO2TW WAX 12IN: Type: IMPLANTABLE DEVICE | Site: KNEE | Status: FUNCTIONAL

## 2024-04-15 DEVICE — SCRW INTERFER FASTTHREAD BIOCOMP 9X30MM: Type: IMPLANTABLE DEVICE | Site: KNEE | Status: FUNCTIONAL

## 2024-04-15 DEVICE — DEV ACL TIGHTROPE2/RT RECON/IB W/CORT/BUTN W/FIBERTAPE/SUT: Type: IMPLANTABLE DEVICE | Site: KNEE | Status: FUNCTIONAL

## 2024-04-15 RX ORDER — FENTANYL CITRATE 50 UG/ML
INJECTION, SOLUTION INTRAMUSCULAR; INTRAVENOUS AS NEEDED
Status: DISCONTINUED | OUTPATIENT
Start: 2024-04-15 | End: 2024-04-15 | Stop reason: SURG

## 2024-04-15 RX ORDER — PROMETHAZINE HYDROCHLORIDE 25 MG/1
25 SUPPOSITORY RECTAL ONCE AS NEEDED
Status: DISCONTINUED | OUTPATIENT
Start: 2024-04-15 | End: 2024-04-15 | Stop reason: HOSPADM

## 2024-04-15 RX ORDER — LIDOCAINE HYDROCHLORIDE 20 MG/ML
INJECTION, SOLUTION EPIDURAL; INFILTRATION; INTRACAUDAL; PERINEURAL AS NEEDED
Status: DISCONTINUED | OUTPATIENT
Start: 2024-04-15 | End: 2024-04-15 | Stop reason: SURG

## 2024-04-15 RX ORDER — BUPIVACAINE HYDROCHLORIDE 5 MG/ML
INJECTION, SOLUTION EPIDURAL; INTRACAUDAL
Status: COMPLETED | OUTPATIENT
Start: 2024-04-15 | End: 2024-04-15

## 2024-04-15 RX ORDER — ACETAMINOPHEN 500 MG
1000 TABLET ORAL ONCE
Status: COMPLETED | OUTPATIENT
Start: 2024-04-15 | End: 2024-04-15

## 2024-04-15 RX ORDER — DEXAMETHASONE SODIUM PHOSPHATE 4 MG/ML
4 INJECTION, SOLUTION INTRA-ARTICULAR; INTRALESIONAL; INTRAMUSCULAR; INTRAVENOUS; SOFT TISSUE ONCE
Status: COMPLETED | OUTPATIENT
Start: 2024-04-15 | End: 2024-04-15

## 2024-04-15 RX ORDER — PROPOFOL 10 MG/ML
VIAL (ML) INTRAVENOUS AS NEEDED
Status: DISCONTINUED | OUTPATIENT
Start: 2024-04-15 | End: 2024-04-15 | Stop reason: SURG

## 2024-04-15 RX ORDER — MEPERIDINE HYDROCHLORIDE 25 MG/ML
12.5 INJECTION INTRAMUSCULAR; INTRAVENOUS; SUBCUTANEOUS
Status: DISCONTINUED | OUTPATIENT
Start: 2024-04-15 | End: 2024-04-15 | Stop reason: HOSPADM

## 2024-04-15 RX ORDER — MIDAZOLAM HYDROCHLORIDE 2 MG/2ML
4 INJECTION, SOLUTION INTRAMUSCULAR; INTRAVENOUS ONCE
Status: COMPLETED | OUTPATIENT
Start: 2024-04-15 | End: 2024-04-15

## 2024-04-15 RX ORDER — OXYCODONE AND ACETAMINOPHEN 7.5; 325 MG/1; MG/1
1 TABLET ORAL EVERY 4 HOURS PRN
Qty: 40 TABLET | Refills: 0 | Status: SHIPPED | OUTPATIENT
Start: 2024-04-15

## 2024-04-15 RX ORDER — DEXAMETHASONE SODIUM PHOSPHATE 4 MG/ML
INJECTION, SOLUTION INTRA-ARTICULAR; INTRALESIONAL; INTRAMUSCULAR; INTRAVENOUS; SOFT TISSUE AS NEEDED
Status: DISCONTINUED | OUTPATIENT
Start: 2024-04-15 | End: 2024-04-15 | Stop reason: SURG

## 2024-04-15 RX ORDER — ONDANSETRON 2 MG/ML
INJECTION INTRAMUSCULAR; INTRAVENOUS AS NEEDED
Status: DISCONTINUED | OUTPATIENT
Start: 2024-04-15 | End: 2024-04-15 | Stop reason: SURG

## 2024-04-15 RX ORDER — ONDANSETRON 2 MG/ML
4 INJECTION INTRAMUSCULAR; INTRAVENOUS ONCE AS NEEDED
Status: DISCONTINUED | OUTPATIENT
Start: 2024-04-15 | End: 2024-04-15 | Stop reason: HOSPADM

## 2024-04-15 RX ORDER — OXYCODONE HYDROCHLORIDE 5 MG/1
5 TABLET ORAL
Status: DISCONTINUED | OUTPATIENT
Start: 2024-04-15 | End: 2024-04-15 | Stop reason: HOSPADM

## 2024-04-15 RX ORDER — DEXMEDETOMIDINE HYDROCHLORIDE 100 UG/ML
INJECTION, SOLUTION INTRAVENOUS AS NEEDED
Status: DISCONTINUED | OUTPATIENT
Start: 2024-04-15 | End: 2024-04-15 | Stop reason: SURG

## 2024-04-15 RX ORDER — CEFAZOLIN SODIUM IN 0.9 % NACL 3 G/100 ML
3 INTRAVENOUS SOLUTION, PIGGYBACK (ML) INTRAVENOUS ONCE
Status: COMPLETED | OUTPATIENT
Start: 2024-04-15 | End: 2024-04-15

## 2024-04-15 RX ORDER — NALOXONE HYDROCHLORIDE 4 MG/.1ML
SPRAY NASAL
Qty: 2 EACH | Refills: 0 | Status: SHIPPED | OUTPATIENT
Start: 2024-04-15

## 2024-04-15 RX ORDER — ONDANSETRON 4 MG/1
4 TABLET, ORALLY DISINTEGRATING ORAL EVERY 8 HOURS PRN
Qty: 10 TABLET | Refills: 0 | Status: SHIPPED | OUTPATIENT
Start: 2024-04-15

## 2024-04-15 RX ORDER — ROCURONIUM BROMIDE 10 MG/ML
INJECTION, SOLUTION INTRAVENOUS AS NEEDED
Status: DISCONTINUED | OUTPATIENT
Start: 2024-04-15 | End: 2024-04-15 | Stop reason: SURG

## 2024-04-15 RX ORDER — ASPIRIN 325 MG
325 TABLET, DELAYED RELEASE (ENTERIC COATED) ORAL DAILY
Qty: 14 TABLET | Refills: 0 | Status: SHIPPED | OUTPATIENT
Start: 2024-04-15

## 2024-04-15 RX ORDER — SCOLOPAMINE TRANSDERMAL SYSTEM 1 MG/1
1 PATCH, EXTENDED RELEASE TRANSDERMAL ONCE
Status: DISCONTINUED | OUTPATIENT
Start: 2024-04-15 | End: 2024-04-15 | Stop reason: HOSPADM

## 2024-04-15 RX ORDER — SODIUM CHLORIDE, SODIUM LACTATE, POTASSIUM CHLORIDE, CALCIUM CHLORIDE 600; 310; 30; 20 MG/100ML; MG/100ML; MG/100ML; MG/100ML
9 INJECTION, SOLUTION INTRAVENOUS CONTINUOUS PRN
Status: DISCONTINUED | OUTPATIENT
Start: 2024-04-15 | End: 2024-04-15 | Stop reason: HOSPADM

## 2024-04-15 RX ORDER — PREDNISONE 20 MG/1
20 TABLET ORAL DAILY
COMMUNITY

## 2024-04-15 RX ORDER — PROMETHAZINE HYDROCHLORIDE 12.5 MG/1
25 TABLET ORAL ONCE AS NEEDED
Status: DISCONTINUED | OUTPATIENT
Start: 2024-04-15 | End: 2024-04-15 | Stop reason: HOSPADM

## 2024-04-15 RX ADMIN — ROCURONIUM BROMIDE 50 MG: 10 INJECTION, SOLUTION INTRAVENOUS at 13:12

## 2024-04-15 RX ADMIN — HYDROMORPHONE HYDROCHLORIDE 0.5 MG: 1 INJECTION, SOLUTION INTRAMUSCULAR; INTRAVENOUS; SUBCUTANEOUS at 14:45

## 2024-04-15 RX ADMIN — ACETAMINOPHEN 1000 MG: 500 TABLET ORAL at 11:33

## 2024-04-15 RX ADMIN — SCOPALAMINE 1 PATCH: 1 PATCH, EXTENDED RELEASE TRANSDERMAL at 11:33

## 2024-04-15 RX ADMIN — MIDAZOLAM HYDROCHLORIDE 4 MG: 1 INJECTION, SOLUTION INTRAMUSCULAR; INTRAVENOUS at 12:26

## 2024-04-15 RX ADMIN — HYDROMORPHONE HYDROCHLORIDE 0.5 MG: 1 INJECTION, SOLUTION INTRAMUSCULAR; INTRAVENOUS; SUBCUTANEOUS at 14:08

## 2024-04-15 RX ADMIN — ONDANSETRON HYDROCHLORIDE 4 MG: 2 SOLUTION INTRAMUSCULAR; INTRAVENOUS at 13:24

## 2024-04-15 RX ADMIN — OXYCODONE 5 MG: 5 TABLET ORAL at 14:45

## 2024-04-15 RX ADMIN — BUPIVACAINE HYDROCHLORIDE 30 ML: 5 INJECTION, SOLUTION EPIDURAL; INTRACAUDAL; PERINEURAL at 12:25

## 2024-04-15 RX ADMIN — DEXMEDETOMIDINE 10 MCG: 100 INJECTION, SOLUTION INTRAVENOUS at 13:20

## 2024-04-15 RX ADMIN — LIDOCAINE HYDROCHLORIDE 100 MG: 20 INJECTION, SOLUTION INTRAVENOUS at 13:12

## 2024-04-15 RX ADMIN — PROPOFOL 200 MG: 10 INJECTION, EMULSION INTRAVENOUS at 13:12

## 2024-04-15 RX ADMIN — DEXMEDETOMIDINE 10 MCG: 100 INJECTION, SOLUTION INTRAVENOUS at 13:34

## 2024-04-15 RX ADMIN — DEXMEDETOMIDINE 10 MCG: 100 INJECTION, SOLUTION INTRAVENOUS at 13:27

## 2024-04-15 RX ADMIN — SODIUM CHLORIDE, POTASSIUM CHLORIDE, SODIUM LACTATE AND CALCIUM CHLORIDE 9 ML/HR: 600; 310; 30; 20 INJECTION, SOLUTION INTRAVENOUS at 14:48

## 2024-04-15 RX ADMIN — DEXAMETHASONE SODIUM PHOSPHATE 4 MG: 4 INJECTION, SOLUTION INTRAMUSCULAR; INTRAVENOUS at 12:33

## 2024-04-15 RX ADMIN — HYDROMORPHONE HYDROCHLORIDE 0.5 MG: 1 INJECTION, SOLUTION INTRAMUSCULAR; INTRAVENOUS; SUBCUTANEOUS at 14:23

## 2024-04-15 RX ADMIN — DEXMEDETOMIDINE 10 MCG: 100 INJECTION, SOLUTION INTRAVENOUS at 14:20

## 2024-04-15 RX ADMIN — FENTANYL CITRATE 100 MCG: 50 INJECTION, SOLUTION INTRAMUSCULAR; INTRAVENOUS at 13:12

## 2024-04-15 RX ADMIN — Medication 3 G: at 13:16

## 2024-04-15 RX ADMIN — SODIUM CHLORIDE, POTASSIUM CHLORIDE, SODIUM LACTATE AND CALCIUM CHLORIDE 9 ML/HR: 600; 310; 30; 20 INJECTION, SOLUTION INTRAVENOUS at 11:33

## 2024-04-15 RX ADMIN — DEXMEDETOMIDINE 10 MCG: 100 INJECTION, SOLUTION INTRAVENOUS at 14:06

## 2024-04-15 RX ADMIN — DEXAMETHASONE SODIUM PHOSPHATE 8 MG: 4 INJECTION, SOLUTION INTRAMUSCULAR; INTRAVENOUS at 13:24

## 2024-04-15 NOTE — DISCHARGE INSTRUCTIONS
DISCHARGE INSTRUCTIONS  POST-OPERATIVE  Knee Arthroscopic Surgery      For your surgery you had:  General anesthesia (you may have a sore throat for the first 24 hours)  You received an anesthesia medication today that can cause hormonal forms of birth control to be ineffective. You should use a different form of birth control (to prevent pregnancy) for 7 days.   IV sedation.  Local anesthesia  Monitored anesthesia care  You may experience dizziness, drowsiness, or light-headedness for several hours following surgery/procedure.  Do not stay alone today or tonight.  Limit your activity for 24 hours.  Resume your diet slowly.  Follow whatever special dietary instructions you may have been given by your doctor.  You should not drive or operate machinery or drink alcohol for 24 hours or while you are taking pain medication.  You should not sign legally binding documents.  [] If you had a regional block, you will not have control of the leg for up to 12 hours.  Protect the leg and follow your physician's specific instructions.  Post-Operative Day #2 Wednesday  Post-Operative Day #1 is counted as the 1st day after surgery.  Leave ace wrap on day one to aid in the reduction of swelling.  If dressing is too tight it can be rewrapped.  Post-Operative Day #2 Wednesday  Ace wrap and dressing may be removed.  Put a 4x4 dressing to suture or staple site.  Change dressing once or twice daily until suture or staples are removed.  It is normal to have some redness or irritation around skin sutures or stapes, however, if you have any expanding areas of redness with a persistent fever and increasing pain notify the physician as soon as possible.  On Post-Operative Day #2, you may want to reapply the ace wrap.  Put ace wrap directly over the knee to add compression and aid in swelling reduction.  It is normal to have some swelling down into the calf and ankle after knee arthroscopy or Anterior Cruciate Ligament (ACL) reconstruction.   If you notice a significant amount of swelling or increasing pain in calf area, notify the physician immediately.   Post-Operative Day #2 Wednesday  You may shower.  During shower, avoid too much water to incision site.  Let water run down leg and then pat dry.  Do not put any ointments on the incision unless directed by surgeon.  Reapply dry dressing to sutures or staple sites.    General Information  Full weight bearing with crutches is allowed after a standard knee arthroscopy or ACL reconstruction unless instructed otherwise by surgeon.  You may put as much weight on knee as tolerable.  If given a knee immobilizer postoperatively, usually with an ACL reconstruction, this is for protection with early ambulation until you are steadier on your feet.  It is very important to take off immobilizer to do range of motion and flexion and extension exercises.  You do not have to sleep in the knee immobilizer.  Knee range of motion exercises should be started as early as the day of surgery.  Try to achieve full extension and start working on range of motion and flexion of the knee.  Move the ankle up and down periodically to help reduce swelling as well as reduce blood pooling.  To allow full extension of the knee and prevent blood clots it is very important to put pillows at the level of the mid-calf to the foot.  DO NOT put pillows directly behind knee.  SPECIAL INSTRUCTIONS:                                                             DISCHARGE INSTRUCTIONS  POST-OPERATIVE   Knee Arthroscopic Surgery      General Instructions  You will receive a prescription for physical therapy, unless otherwise instructed by physician.  Physical therapy is imperative especially after ACL reconstruction and some knee arthroscopies for swelling reduction, knee range of motion and strengthening.  The Cold Therapy System can help reduce swelling and decrease pain.  Utilize device for 30-60 minutes per session, with 30-60 minute breaks in  between sessions.  It is recommended to use, as directed, for the first 72 hours after surgery until bedtime.  After 72 hours, continue using the device as needed until your follow-up appointment with your physician.  Never place directly on skin.  Please refer to the instruction sheet given.   [] Use ice pack on the knee for 20 minutes on and 20 minutes off.  Never place ice directly on the skin.  By following this, you will cool the knee sufficiently.  Avoid getting dressing wet.  To help reduce swelling and minimize throbbing pain, use pillows to keep the knee elevated above the level of the heart, even while sleeping.  Sit with the leg propped up on a footstool or chair with pillows.  Exercises toes for 10 minutes every hour while awake.  If you are given a prescription for pain medication, take it as prescribed.  If you are able to take over-the-counter anti-inflammatory medications such as Motrin or Aleve, you may take as per package instructions in between the pain medication to help enhance pain control and reduce swelling.  If you are taking the pain medication prescribed, do not take Tylenol too unless you consult with the pharmacist. Generally, the pain medications prescribed have Tylenol in them and too much Tylenol can be harmful.  You should stop the anti-inflammatory medication if you experience any stomach/GI disturbances.  Consult with your physician or your pharmacist before taking over-the-counter pain medications/anti-inflammatories. It is not uncommon to have a fever post-operatively especially in the first 24-48 hours.  Deep breathing and coughing and early ambulation will help.  Anti-inflammatories can be used for fever reduction also.  If unable to urinate 6 to 8 hours after surgery or urinating frequently in small amounts, notify the physician or go to the nearest Emergency Room.  NOTIFY YOUR PHYSICIAN IF YOU EXPERIENCE:  Numbness of toes.  Inability to move toes.  Extreme coldness, paleness  or blue dis-coloration of toes.  Any pain other than from the incision, such as swelling of the leg that blocks circulation of the toes.  Follow verbal instructions from your doctor.  Medications per physician instructions as indicated on Discharge Medication Information Sheet.  You should see  ______________________for follow-up care  on   .  Phone number: _________________________  Missing your appointment/follow-up could lead to serious complications.  If you have an emergency and cannot reach your doctor, go to an Emergency Room nearest your home.

## 2024-04-15 NOTE — ANESTHESIA PREPROCEDURE EVALUATION
Anesthesia Evaluation     Patient summary reviewed and Nursing notes reviewed   history of anesthetic complications:  PONV  NPO Solid Status: > 8 hours  NPO Liquid Status: > 2 hours           Airway   Mallampati: II  TM distance: >3 FB  Neck ROM: full  No difficulty expected  Dental      Pulmonary - normal exam    breath sounds clear to auscultation  (+) asthma,  Cardiovascular - normal exam  Exercise tolerance: good (4-7 METS)    Rhythm: regular  Rate: normal    (+) hypertension      Neuro/Psych  (+) dizziness/light headedness, syncope, psychiatric history Depression and Anxiety  GI/Hepatic/Renal/Endo    (+) obesity, GERD well controlled    Musculoskeletal (-) negative ROS    Abdominal    Substance History - negative use     OB/GYN negative ob/gyn ROS         Other - negative ROS       ROS/Med Hx Other: PAT Nursing Notes unavailable.               Anesthesia Plan    ASA 2     general with block     (Patient understands anesthesia not responsible for dental damage.  Scopalamine patch preop, Iv decadron preop  )  intravenous induction     Anesthetic plan, risks, benefits, and alternatives have been provided, discussed and informed consent has been obtained with: patient.  Pre-procedure education provided  Plan discussed with CRNA.    CODE STATUS:

## 2024-04-15 NOTE — ANESTHESIA PROCEDURE NOTES
Peripheral Block      Patient reassessed immediately prior to procedure    Patient location during procedure: pre-op  Reason for block: at surgeon's request and post-op pain management  Preanesthetic Checklist  Completed: patient identified, IV checked, site marked, risks and benefits discussed, surgical consent, monitors and equipment checked, pre-op evaluation and timeout performed  Prep:  Pt Position: supine  Sterile barriers:alcohol skin prep, partial drape, cap, washed/disinfected hands, mask and gloves  Prep: ChloraPrep  Patient monitoring: blood pressure monitoring, continuous pulse oximetry and EKG  Procedure    Sedation: yes  Performed under: local infiltration  Guidance:ultrasound guided and nerve stimulator    ULTRASOUND INTERPRETATION.  Using ultrasound guidance a 20 G gauge needle was placed in close proximity to the nerve, at which point, under ultrasound guidance anesthetic was injected in the area of the nerve and spread of the anesthesia was seen on ultrasound in close proximity thereto.  There were no abnormalities seen on ultrasound; a digital image was taken; and the patient tolerated the procedure with no complications. Images:still images obtained, printed/placed on chart    Laterality:right  Block Type:adductor canal block  Injection Technique:single-shot  Needle Type:echogenic  Needle Gauge:20 G (4in)  Resistance on Injection: none    Medications Used: bupivacaine (PF) (MARCAINE) 0.5 % injection - Injection   30 mL - 4/15/2024 12:25:00 PM      Post Assessment  Injection Assessment: negative aspiration for heme, no paresthesia on injection and incremental injection  Patient Tolerance:comfortable throughout block  Complications:no  Additional Notes  The block or continuous infusion is requested by the referring physician for management of postoperative pain, or pain related to a procedure. Ultrasound guidance (deemed medically necessary). Painless injection, pt was awake and conversant during  the procedure without complications. Needle and surrounding structures visualized throughout procedure. No adverse reactions or complications seen during this period. Post-procedure image showed no signs of complication, and anatomy was consistent with an uncomplicated nerve blockade.

## 2024-04-15 NOTE — ANESTHESIA POSTPROCEDURE EVALUATION
Patient: Zhane Traylor    Procedure Summary       Date: 04/15/24 Room / Location: [facility] OR [address] [place] [address] OR [facility]    Anesthesia Start: 1306 Anesthesia Stop: 1440    Procedure: KNEE ANTERIOR CRUCIATE LIGAMENT RECONSTRUCTION WITH ALLOGRAFT LATERAL RETINACULAR RELEASE (Right: Knee) Diagnosis:       Rupture of anterior cruciate ligament of right knee, subsequent encounter      (Rupture of anterior cruciate ligament of right knee, subsequent encounter [S83.511D])    Surgeons: Troy Hsu MD Provider: Odell Gordon MD    Anesthesia Type: general with block ASA Status: 2            Anesthesia Type: general with block    Vitals  Vitals Value Taken Time   /65 04/15/24 1444   Temp     Pulse 100 04/15/24 1449   Resp     SpO2 95 % 04/15/24 1449   Vitals shown include unfiled device data.        Post Anesthesia Care and Evaluation    Patient location during evaluation: bedside  Patient participation: complete - patient participated  Level of consciousness: awake and alert  Pain management: adequate    Airway patency: patent  Anesthetic complications: No anesthetic complications  PONV Status: none  Cardiovascular status: acceptable  Respiratory status: acceptable  Hydration status: acceptable    Comments: An Anesthesiologist personally participated in the most demanding procedures (including induction and emergence if applicable) in the anesthesia plan, monitored the course of anesthesia administration at frequent intervals and remained physically present and available for immediate diagnosis and treatment of emergencies.

## 2024-04-15 NOTE — BRIEF OP NOTE
KNEE ANTERIOR CRUCIATE LIGAMENT RECONSTRUCTION  Progress Note    Zhane Traylor  4/15/2024    Pre-op Diagnosis:   Rupture of anterior cruciate ligament of right knee, subsequent encounter [S83.511D]       Post-Op Diagnosis Codes:     * Rupture of anterior cruciate ligament of right knee, subsequent encounter [S83.511D]  Right Knee Patellar Maltracking    Procedure/CPT® Codes:        Procedure(s):  RIGHT KNEE ARTHROSCOPIC ANTERIOR CRUCIATE LIGAMENT RECONSTRUCTION WITH ALLOGRAFT,  LATERAL RETINACULAR RELEASE              Surgeon(s):  Troy Hsu MD    Anesthesia: General    Staff:   Circulator: Efren Myers RN  Scrub Person: Cristy Rocha; Niko Ramos  Vendor Representative: Vladimir Guerrier  Assistant: Trent Hicks RN  Assistant: Trent Hicks RN      Estimated Blood Loss:  10cc    Urine Voided: * No values recorded between 4/15/2024  1:06 PM and 4/15/2024  2:36 PM *    Specimens:                None          Drains: * No LDAs found *    Findings: ACL tear, lateral meniscus subluxation        Complications: None     Assistant: Trent Hicks RN  was responsible for performing the following activities: Retraction, Suction, Irrigation, and Placing Dressing and their skilled assistance was necessary for the success of this case.    Troy Hsu MD     Date: 4/15/2024  Time: 14:40 EDT

## 2024-04-16 NOTE — OP NOTE
KNEE ANTERIOR CRUCIATE LIGAMENT RECONSTRUCTION  Procedure Report    Patient Name:  Zhane Traylor  YOB: 1995    Date of Surgery:  4/15/2024     Indications:  Patient has failed conservative treatment and wishes to undergo operative treatment. Risks and benefits of operative treatment including bleeding, infection, damage to neurovascular structures, continued pain and disability, need for additional procedures, among others. Informed consent was obtained and they wished to proceed.    Pre-op Diagnosis:   Rupture of anterior cruciate ligament of right knee, subsequent encounter [S83.511D]       Post-Op Diagnosis Codes:     * Rupture of anterior cruciate ligament of right knee, subsequent encounter [S83.511D]  Right knee patellar maltracking/instability  Right knee chondromalacia    Procedure/CPT® Codes:      Procedure(s):  Right KNEE ARTHROSCOPIC ANTERIOR CRUCIATE LIGAMENT RECONSTRUCTION WITH ALLOGRAFT, LATERAL RETINACULAR RELEASE, chondroplasty    Staff:  Surgeon(s):  Troy Hsu MD    Assistant: Trent Hicks RN    Anesthesia: General    Estimated Blood Loss: 5 mL    Implants:    Implant Name Type Inv. Item Serial No.  Lot No. LRB No. Used Action   SUTR TIGERSTICK SUTR PASS NO2TW WAX 12IN - [id number] Implant SUTR TIGERSTICK SUTR PASS NO2TW WAX 12IN  [employer] [id number] Right 1 Implanted   DEV ACL TIGHTROPE2/RT RECON/IB W/ANGEL/BUTN W/FIBERTAPE/SUT - [id number] Implant DEV ACL TIGHTROPE2/RT RECON/IB W/ANGEL/BUTN W/FIBERTAPE/SUT  [employer] [id number] Right 1 Implanted   TNDN SPEEDGRFT 2STRND PRESUT W/TIGHTROPE - [id number] Implant TNDN SPEEDGRFT 2STRND PRESUT W/TIGHTROPE  [employer] [id number] Right 1 Implanted   SYS IMP ACL SWIVELOCK 2NDARY/FIX SUTANCH/BIOCOMP 4.75X19.1MM - [id number] Implant SYS IMP ACL SWIVELOCK 2NDARY/FIX SUTANCH/BIOCOMP 4.75X19.1MM  [employer] [id number] Right 1 Implanted       Specimen:          None        Findings: ACL tear, patellar maltracking,  chondromalacia    Complications: None    Description of Procedure: The operative site was marked the preoperative holding area.  The patient received preoperative lower extremity nerve block by anesthesia.  The patient was brought the operating room and general anesthesia was applied.  The patient was placed on the OR table and all bony prominences were padded.  The operative leg was placed into an arthroscopic leg winston and a tourniquet was placed on the upper thigh.  The contralateral leg was placed in a well-leg winston.  All bony prominences were padded.  The foot of the bed was lowered.  Formal timeout was held and preoperative antibiotic was given.  The knee was examined under anesthesia and found to have positive test for ACL deficiency.    At this point the ACL allograft was prepared.  The tibialis speed graft was sized and was looped over an Arthrex tight rope button.  This was sized to a 9.5 mm.  An internal brace was used in the tight rope.  Graft was then tensioned on the back table and wrapped in a moist sponge.    The limb was exsanguinated and the tourniquet was inflated.  At this point a lateral portal was established and the arthroscope was inserted into the joint.  A diagnostic arthroscopy was performed.  There was chondromalacia of the patella with grade II-III chondromalacia of the patella.  The trochlear cartilage was largely intact without significant chondromalacia.  The patellofemoral joint was debrided with a motorized shaver.  There is lateral tilt and subluxation of the patella.  No dislocation of the patella was noted.  The lateral and medial gutters were normal and no loose bodies were noted.  The medial compartment was entered and the medial meniscus was found to be intact.  There is no significant meniscocapsular separation or instability of the meniscus.  There was no medial meniscus tear or chondral injury.  The lateral compartment was entered and the lateral meniscus was found to be  intact.  The lateral tibiofemoral cartilage was intact.    A full-thickness ACL tear was identified.  The PCL tendon was intact.  The ACL stump was debrided with a motorized shaver.  The arthroscope was used to view from the medial portal and the flip cutter was introduced into the joint at 105 degrees.  A small incision was made on the lateral thigh and the drill pin was introduced into the joint at the ACL origin.  A 9-1/2 mm tunnel was drilled 25 mm in length.  A FiberWire suture was then placed through the tunnel and retrieved out the lateral portal.    The arthroscope was switched back to the lateral portal and a tibial tunnel guide was placed through the medial portal.  It was inserted at the ACL footprint just posterior to the anterior horn of the lateral meniscus.  A small incision was made on the proximal medial tibia and the drill pin was placed through the tibia into the knee.  A 9.5 mm  tibial tunnel was drilled.  The passing suture was then brought down to the tibial tunnel.  The graft was passed and the TightRope button was flipped on the far cortex of the femur.  This was confirmed by fluoroscopy.  The TightRope sutures were then pulled docking the  patellar bone plug into the femoral tunnel.  The knee was cycled through range of motion and was held in extension while a nitinol guidewire was placed in the tibial tunnel and the 9 x 30 mm screw was inserted.  The fiber tape internal brace fixation was achieved by inserting a 4.75 SwiveLock just distal to the tunnel, tensioning the sutures from the tibial side of the implant.     At this point the graft was tensioned and found to be in good position and good tension.    Attention was then turned to the patella.  A lateral retinacular release was performed.  The hook ablation probe was used to release the lateral retinacular tissue from the superior to inferior patella.  This significantly improved patellar tracking, and the patella now engaged the center  of the trochlea with knee flexion.  The tourniquet was released and bleeding was controlled with electrocautery.    The subcutaneous tissues were closed with 2-0 Vicryl and the skin with running Monocryl.  The portal incisions were closed with nylon suture.  Sterile dressings were placed and cold therapy and an ACL brace were placed at 0 to 90 degrees of flexion.    The patient awoke from anesthesia in stable condition.  There were no complications.  All counts were correct and the patient was stable to recovery.    Assistant: Trent Hicks RN  was responsible for performing the following activities: Retraction, Suction, Irrigation, and Placing Dressing and their skilled assistance was necessary for the success of this case.    Troy Hsu MD     Date: 4/15/2024  Time: 20:53 EDT

## 2024-04-23 DIAGNOSIS — S83.511A RUPTURE OF ANTERIOR CRUCIATE LIGAMENT OF RIGHT KNEE, INITIAL ENCOUNTER: ICD-10-CM

## 2024-04-23 DIAGNOSIS — J45.30 MILD PERSISTENT ASTHMA WITHOUT COMPLICATION: ICD-10-CM

## 2024-04-23 RX ORDER — OXYCODONE AND ACETAMINOPHEN 7.5; 325 MG/1; MG/1
1 TABLET ORAL EVERY 4 HOURS PRN
Qty: 40 TABLET | Refills: 0 | Status: SHIPPED | OUTPATIENT
Start: 2024-04-23

## 2024-04-23 NOTE — TELEPHONE ENCOUNTER
PT CALLING OT GET A REFILL ON THIS MEDICATION BUT STATES THAT SHE DOESN'T NEED THE WHOLE THING JUST ENOUGH TO GET HER THROUGH PHYSICAL THERAPY

## 2024-04-24 ENCOUNTER — PROCEDURE VISIT (OUTPATIENT)
Dept: OBSTETRICS AND GYNECOLOGY | Facility: CLINIC | Age: 29
End: 2024-04-24
Payer: COMMERCIAL

## 2024-04-24 ENCOUNTER — TELEPHONE (OUTPATIENT)
Dept: CARDIOLOGY | Facility: CLINIC | Age: 29
End: 2024-04-24
Payer: COMMERCIAL

## 2024-04-24 VITALS
WEIGHT: 272 LBS | HEART RATE: 82 BPM | DIASTOLIC BLOOD PRESSURE: 88 MMHG | HEIGHT: 66 IN | BODY MASS INDEX: 43.71 KG/M2 | SYSTOLIC BLOOD PRESSURE: 128 MMHG

## 2024-04-24 DIAGNOSIS — Z30.46 NEXPLANON REMOVAL: Primary | ICD-10-CM

## 2024-04-24 DIAGNOSIS — I10 ESSENTIAL HYPERTENSION: ICD-10-CM

## 2024-04-24 RX ORDER — LOSARTAN POTASSIUM 50 MG/1
50 TABLET ORAL DAILY
Qty: 90 TABLET | Refills: 1 | Status: SHIPPED | OUTPATIENT
Start: 2024-04-24

## 2024-04-24 RX ORDER — MONTELUKAST SODIUM 10 MG/1
10 TABLET ORAL NIGHTLY
Qty: 30 TABLET | Refills: 0 | Status: SHIPPED | OUTPATIENT
Start: 2024-04-24

## 2024-04-24 NOTE — TELEPHONE ENCOUNTER
[facility] received a fax that requires your attention. The document has been indexed to the patient’s chart for your review.      Reason for sending: EXTERNAL MEDICAL RECORD NOTIFICATION     Documents Description: [facility] REFILL-4.24.24    Name of Sender: [facility]     Date Indexed: 4.24.24

## 2024-04-24 NOTE — PROGRESS NOTES
Procedures  Nexplanon Removal    Date of Insertion:  known  Date of Removal:  April 24, 2024    Information related to removal of the implant:   Reason(s) for removal:  Product life completed  Was implant palpable before removal?  Yes    Procedure Time Out Documentation  The risks of the procedure were reviewed with the patient including bleeding, infection and unlikely damage to the uterus and the benefits of the procedure were explained to the patient and Written informed consent was obtained      Procedure:    Implant identified.  Left upper arm prepped with Betadinex3.  1% lidocaine with epinephrine injected at planned incision site.      A vertical incision 2 mm was performed with scalpel at the distal end of implant.  The implant was removed using a pop-out technique. The implant was inspected and found to be intact and complete.  Steri strips and a pressure dressing were applied to the site.  After removal instructions were given and verbally reviewed with the patient who acknowledged her understanding.    Difficulties with the implant removal procedure?  no    Patient tolerated the procedure well without complications.    Dell Gann MD  4/24/2024  15:48 EDT

## 2024-04-29 ENCOUNTER — OFFICE VISIT (OUTPATIENT)
Dept: ORTHOPEDIC SURGERY | Facility: CLINIC | Age: 29
End: 2024-04-29
Payer: COMMERCIAL

## 2024-04-29 VITALS
DIASTOLIC BLOOD PRESSURE: 94 MMHG | OXYGEN SATURATION: 96 % | BODY MASS INDEX: 43.58 KG/M2 | SYSTOLIC BLOOD PRESSURE: 138 MMHG | WEIGHT: 271.17 LBS | HEIGHT: 66 IN | HEART RATE: 85 BPM

## 2024-04-29 DIAGNOSIS — Z47.89 AFTERCARE FOLLOWING SURGERY OF THE MUSCULOSKELETAL SYSTEM: Primary | ICD-10-CM

## 2024-04-29 PROCEDURE — 3075F SYST BP GE 130 - 139MM HG: CPT | Performed by: PHYSICIAN ASSISTANT

## 2024-04-29 PROCEDURE — 99024 POSTOP FOLLOW-UP VISIT: CPT | Performed by: PHYSICIAN ASSISTANT

## 2024-04-29 PROCEDURE — 1159F MED LIST DOCD IN RCRD: CPT | Performed by: PHYSICIAN ASSISTANT

## 2024-04-29 PROCEDURE — 1160F RVW MEDS BY RX/DR IN RCRD: CPT | Performed by: PHYSICIAN ASSISTANT

## 2024-04-29 PROCEDURE — 3080F DIAST BP >= 90 MM HG: CPT | Performed by: PHYSICIAN ASSISTANT

## 2024-04-29 NOTE — PROGRESS NOTES
Chief Complaint  Pain and Follow-up of the Right Knee and Suture / Staple Removal    Subjective          History of Present Illness      Zhane Traylor is a 28 y.o. female  presents to [facility] for     Patient presents with her aunt and her daughter for her 2-week postop evaluation of right knee arthroscopic ACL reconstruction with allograft, lateral retinacular release and chondroplasty, 4/15/2024.  Patient is attending PTA in [facility] on [facility].  She states they already have her walking with the brace unlocked at extension she states that she can get it to about 107 degrees with therapy for flexion.  She has pain medication but states she is only taking it sparingly.  She denies calf pain she does admit to some bruising to the anterior shin region and medial knee.  She states the incisions have been healing well and denies redness drainage or dehiscence.  She presents using her ACL brace and her crutches.  She has been compliant with guarded weightbearing and crutches use.      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   Cardiovascular: Denies chest pain, shortness of breath  Skin: Denies rashes, acute skin changes  Neurologic: Denies headache, loss of consciousness  MSK: Right knee pain      Objective   Vital Signs:   /94   Pulse 85   Ht 167.6 cm (66\")   Wt 123 kg (271 lb 2.7 oz)   SpO2 96%   BMI 43.77 kg/m²     Body mass index is 43.77 kg/m².    Physical Exam       Right knee: Incisions are healing well, no erythema, no drainage or dehiscence, no signs of infection, resolving ecchymosis to the medial knee and medial shin region, nontender calf, negative Sushma testing, patient able to straight leg raise extension full flexion 90, stable.      Procedures    Imaging Results (Most Recent)       None             Result Review :   The following data was reviewed by: AMY Marino on 04/29/2024:               Assessment and Plan    Diagnoses and all orders for this visit:    1. Aftercare following surgery of RIGHT KNEE ARTHROSCOPIC ANTERIOR CRUCIATE LIGAMENT RECONSTRUCTION WITH ALLOGRAFT,  LATERAL RETINACULAR RELEASE 4/15/24 (Primary)        Reviewed operative images, operative report with the patient and her family patient was advised to continue guarded weightbearing with therapy guidance on when to discontinue crutches.  Continue ACL brace brace was advanced to 90 degrees it was at 80 degrees.  May advance with therapy guidance.  Continue pain meds as needed, sutures/staples removed in office today. Steri-strips applied. Discussed incision care/hygiene. May shower, but do not submerge in water until fully healed.  Advised patient that if any concerning symptoms regarding incision appearance occur that they should call us right away, patient expressed understanding.  Follow-up in 4 weeks for recheck    Call or return if worsening symptoms.    Follow Up   Return in about 4 weeks (around 5/27/2024) for Recheck.  Patient was given " instructions and counseling regarding her condition or for health maintenance advice. Please see specific information pulled into the AVS if appropriate.       EMR Dragon/Transcription disclaimer:  Part of this note may be an electronic transcription/translation of spoken language to printed text using the Dragon Dictation System

## 2024-05-28 ENCOUNTER — OFFICE VISIT (OUTPATIENT)
Dept: ORTHOPEDIC SURGERY | Facility: CLINIC | Age: 29
End: 2024-05-28
Payer: COMMERCIAL

## 2024-05-28 VITALS
WEIGHT: 271 LBS | DIASTOLIC BLOOD PRESSURE: 85 MMHG | SYSTOLIC BLOOD PRESSURE: 142 MMHG | HEART RATE: 107 BPM | HEIGHT: 66 IN | OXYGEN SATURATION: 95 % | BODY MASS INDEX: 43.55 KG/M2

## 2024-05-28 DIAGNOSIS — E66.01 OBESITY, MORBID, BMI 40.0-49.9: ICD-10-CM

## 2024-05-28 DIAGNOSIS — Z47.89 AFTERCARE FOLLOWING SURGERY OF THE MUSCULOSKELETAL SYSTEM: Primary | ICD-10-CM

## 2024-05-28 NOTE — PROGRESS NOTES
"Chief Complaint  Follow-up of the Right Knee     Subjective      Zhane Traylor presents to [facility] for a follow up for her right knee ACL reconstruction with allograft lateral retinacular release done on 04/15/24. She presents today without her brace. She reports she still walks with a limp. She reports her pain has improved. She is no longer in a brace but is requesting a normal knee brace today for stability.     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     Review of Systems     Constitutional: Denies fevers, chills, weight loss  Cardiovascular: Denies chest pain, shortness of breath  Skin: Denies rashes, acute skin changes  Neurologic: Denies headache, loss of consciousness  MSK: Right knee pain       Vital Signs:   /85   Pulse 107   Ht 167.6 cm (66\")   Wt 123 kg (271 lb)   SpO2 95%   BMI 43.74 kg/m²            Ortho Exam    Physical Exam  General:Alert. No acute distress   Right lower extremity: full extension, flexion to 125 degrees, stable to varus/valgus stress, stable to anterior/posterior drawer, well healed surgical incision, no signs of " infection, no redness or drainage, neurovascularly intact, calf soft, positive EHL, FHL, GS, and TA. Sensation intact to all 5 nerves of the foot.     Procedures    Imaging Results (Most Recent)       None             Result Review :       No results found.           Assessment and Plan     Diagnoses and all orders for this visit:    1. Aftercare following surgery of RIGHT KNEE ARTHROSCOPIC ANTERIOR CRUCIATE LIGAMENT RECONSTRUCTION WITH ALLOGRAFT,  LATERAL RETINACULAR RELEASE 4/15/24 (Primary)    2. Obesity, morbid, BMI 40.0-49.9        The patient presents here today for a follow up for her right knee ACL reconstruction done on 04/15/24.     Normal knee brace given to the patient today     Patient will continue over the counter medications for pain control.     Work note provided to the patient.     She will continue outpatient physical therapy ..     Educated on risk of elevated BMI.  Discussed options for weight loss/decreasing BMI prior to procedure including dietician consult, weight loss options and exercise program. and Call or return if worsening symptoms.    Follow Up     6 weeks       Patient was given instructions and counseling regarding her condition or for health maintenance advice. Please see specific information pulled into the AVS if appropriate.     Scribed for Troy Hsu MD by hCelle Mccord.  05/28/24   15:45 EDT    I have personally performed the services described in this document as scribed by the above individual and it is both accurate and complete. Troy Hsu MD 05/29/24

## 2024-07-09 ENCOUNTER — OFFICE VISIT (OUTPATIENT)
Dept: ORTHOPEDIC SURGERY | Facility: CLINIC | Age: 29
End: 2024-07-09
Payer: COMMERCIAL

## 2024-07-09 VITALS
BODY MASS INDEX: 43.55 KG/M2 | DIASTOLIC BLOOD PRESSURE: 85 MMHG | HEIGHT: 66 IN | OXYGEN SATURATION: 97 % | HEART RATE: 99 BPM | WEIGHT: 271 LBS | SYSTOLIC BLOOD PRESSURE: 133 MMHG

## 2024-07-09 DIAGNOSIS — E66.01 OBESITY, MORBID, BMI 40.0-49.9: ICD-10-CM

## 2024-07-09 DIAGNOSIS — Z47.89 AFTERCARE FOLLOWING SURGERY OF THE MUSCULOSKELETAL SYSTEM: Primary | ICD-10-CM

## 2024-07-09 PROCEDURE — 3079F DIAST BP 80-89 MM HG: CPT | Performed by: ORTHOPAEDIC SURGERY

## 2024-07-09 PROCEDURE — 99024 POSTOP FOLLOW-UP VISIT: CPT | Performed by: ORTHOPAEDIC SURGERY

## 2024-07-09 PROCEDURE — 3075F SYST BP GE 130 - 139MM HG: CPT | Performed by: ORTHOPAEDIC SURGERY

## 2024-07-09 NOTE — PROGRESS NOTES
"Chief Complaint  Pain and Follow-up of the Right Knee     Subjective      Zhane Traylor presents to [facility] for a follow up for her right knee ACL reconstruction with allograft with lateral retinacular release performed on 04/15/24. She presents today ambulating without a brace and has has returned to work. She has been doing home exercises and using over the counter medications for pain control. She is overall doing well. She reports no new injury or falls since her last visit.     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     Review of Systems     Constitutional: Denies fevers, chills, weight loss  Cardiovascular: Denies chest pain, shortness of breath  Skin: Denies rashes, acute skin changes  Neurologic: Denies headache, loss of consciousness  MSK: Right knee pain       Vital Signs:   /85   Pulse 99   Ht 167.6 cm (66\")   Wt 123 kg (271 lb)   SpO2 97%   BMI 43.74 kg/m²            Ortho Exam    Physical Exam  General:Alert. No acute distress   Right lower extremity: full extension, flexion to 120 degrees, stable to varus/valgus stress, stable to " anterior/posterior drawer , well healed surgical incision, non tender to the medial joint line  and lateral joint line, no swelling, neurovascularly intact, calf soft, positive EHL, FHL, GS, and TA. Sensation intact to all 5 nerves of the foot.     Procedures    Imaging Results (Most Recent)       None             Result Review :       No results found.           Assessment and Plan     Diagnoses and all orders for this visit:    1. Aftercare following surgery of RIGHT KNEE ARTHROSCOPIC ANTERIOR CRUCIATE LIGAMENT RECONSTRUCTION WITH ALLOGRAFT,  LATERAL RETINACULAR RELEASE 4/15/24 (Primary)    2. Obesity, morbid, BMI 40.0-49.9        The patient presents here today for a follow up for her right knee ACL reconstruction allograft with lateral retinacular release performed on 04/15/24.     Order for physical therapy placed today and will continue over the counter medications for pain control.     Call or return if worsening symptoms.    Follow Up     3 months     Patient was given instructions and counseling regarding her condition or for health maintenance advice. Please see specific information pulled into the AVS if appropriate.     Scribed for Troy Hsu MD by Chelle Mccord.  07/09/24   16:19 EDT    I have personally performed the services described in this document as scribed by the above individual and it is both accurate and complete. Troy Hsu MD 07/10/24

## 2024-07-25 DIAGNOSIS — J45.30 MILD PERSISTENT ASTHMA WITHOUT COMPLICATION: ICD-10-CM

## 2024-07-25 RX ORDER — MONTELUKAST SODIUM 10 MG/1
10 TABLET ORAL NIGHTLY
Qty: 30 TABLET | Refills: 0 | Status: SHIPPED | OUTPATIENT
Start: 2024-07-25

## 2024-07-31 ENCOUNTER — OFFICE VISIT (OUTPATIENT)
Dept: CARDIOLOGY | Facility: CLINIC | Age: 29
End: 2024-07-31
Payer: COMMERCIAL

## 2024-07-31 VITALS
SYSTOLIC BLOOD PRESSURE: 124 MMHG | WEIGHT: 292.2 LBS | HEART RATE: 97 BPM | BODY MASS INDEX: 46.96 KG/M2 | HEIGHT: 66 IN | DIASTOLIC BLOOD PRESSURE: 83 MMHG

## 2024-07-31 DIAGNOSIS — R00.0 TACHYCARDIA: Primary | ICD-10-CM

## 2024-07-31 DIAGNOSIS — I10 ESSENTIAL HYPERTENSION: ICD-10-CM

## 2024-07-31 PROCEDURE — 3079F DIAST BP 80-89 MM HG: CPT | Performed by: FAMILY MEDICINE

## 2024-07-31 PROCEDURE — 3074F SYST BP LT 130 MM HG: CPT | Performed by: FAMILY MEDICINE

## 2024-07-31 PROCEDURE — 99213 OFFICE O/P EST LOW 20 MIN: CPT | Performed by: FAMILY MEDICINE

## 2024-07-31 RX ORDER — LOSARTAN POTASSIUM 50 MG/1
50 TABLET ORAL DAILY
Qty: 90 TABLET | Refills: 3 | Status: SHIPPED | OUTPATIENT
Start: 2024-07-31

## 2024-07-31 RX ORDER — DILTIAZEM HYDROCHLORIDE 240 MG/1
240 CAPSULE, EXTENDED RELEASE ORAL DAILY
Qty: 90 CAPSULE | Refills: 3 | Status: SHIPPED | OUTPATIENT
Start: 2024-07-31

## 2024-07-31 NOTE — PROGRESS NOTES
Chief Complaint  Hypertension, Follow-up, and Rapid Heart Rate    Subjective        History of Present Illness  Zhane Traylor presents to [facility] CARDIOLOGY   Ms. Traylor is a 29-year-old female coming in today for cardiac follow-up for tachycardia/palpitations.  She still has some intermittent symptoms of palpitations.  These are generally worse if she had any caffeine intake.  Symptoms are reasonably well-controlled on diltiazem.  She felt like they were slightly better controlled with metoprolol, however she was having thinning of her hair, she feels like this improved after changing medication.  Since last seen in the office, did undergo ACL reconstruction in April, so she has been less physically active recently compared to her baseline.        Past Medical History:   Diagnosis Date    Acid reflux     Alcoholism in remission 02/05/2021    Anxiety disorder 01/05/2021    Asthma     Constipation     Encounter for blood transfusion     H/O psychiatric care     Hypertension     Left anterior cruciate ligament tear     Major depression 02/05/2021    Obesity     Onychomycosis     PONV (postoperative nausea and vomiting)     Prediabetes     Tachycardia        No Known Allergies     Past Surgical History:   Procedure Laterality Date    DILATATION AND CURETTAGE      KNEE ACL RECONSTRUCTION Left 2/21/2022    Procedure: LEFT KNEE ATHROSCOPIC ANTERIOR CRUCIATE LIGAMENT RECONSTRUCTION  WITH  ALLOGRAFT;  Surgeon: Troy Hsu MD;  Location: [facility];  Service: Orthopedics;  Laterality: Left;    KNEE ACL RECONSTRUCTION Right 4/15/2024    Procedure: KNEE ANTERIOR CRUCIATE LIGAMENT RECONSTRUCTION WITH ALLOGRAFT LATERAL RETINACULAR RELEASE;  Surgeon: Troy Hsu MD;  Location: [facility];  Service: Orthopedics;  Laterality: Right;    WISDOM TOOTH EXTRACTION  2012        Social History  She  reports that she quit smoking about 7 years ago. Her smoking use included cigarettes. She  started smoking about 13 years ago. She has a 3 pack-year smoking history. She has been exposed to tobacco smoke. She has never used smokeless tobacco. She reports that she does not currently use alcohol. She reports that she does not use drugs.    Family History  Her family history includes Atrial fibrillation in her father; Breast cancer in her paternal aunt, paternal grandmother, and paternal great-grandmother; Heart failure in her maternal grandfather, maternal grandmother, paternal grandfather, and paternal grandmother.       Current Outpatient Medications on File Prior to Visit   Medication Sig    aspirin (Ecotrin) 325 MG EC tablet Take 1 tablet by mouth Daily.    eszopiclone (Lunesta) 2 MG tablet Take 1 tablet by mouth At Night As Needed. Take immediately before bedtime    famotidine (Pepcid) 20 MG tablet Take 1 tablet by mouth 2 (Two) Times a Day As Needed for Indigestion.    Fluticasone-Salmeterol (Advair Diskus) 250-50 MCG/ACT DISKUS Inhale 1 puff 2 (Two) Times a Day. For asthma. Rinse mouth after use    folic acid (FOLVITE) 1 MG tablet Take 1 tablet by mouth Daily.    ibuprofen (ADVIL,MOTRIN) 800 MG tablet Take 1 tablet by mouth 3 (Three) Times a Day.    lamoTRIgine (LaMICtal) 100 MG tablet Take 1 tablet by mouth 2 (Two) Times a Day. (Patient taking differently: Take 1 tablet by mouth 2 (Two) Times a Day. Only takes at night)    lamoTRIgine (LaMICtal) 100 MG tablet Take 1 tablet by mouth 2 (Two) Times a Day.    lamoTRIgine (LaMICtal) 100 MG tablet Take 1 tablet by mouth 2 (Two) Times a Day.    levalbuterol (XOPENEX HFA) 45 MCG/ACT inhaler Inhale 1-2 puffs Every 4 (Four) Hours As Needed for Wheezing or Shortness of Air.    levalbuterol (Xopenex) 1.25 MG/3ML nebulizer solution Take 1 ampule by nebulization Every 4 (Four) Hours As Needed for Wheezing or Shortness of Air.    LORazepam ER (Loreev XR) 3 MG capsule extended-release 24 hour sprinkle take 1 capsule by mouth every day    lurasidone (Latuda) 40 MG  "tablet tablet Take 1 tablet by mouth every night at bedtime.    lurasidone (Latuda) 40 MG tablet tablet Take 1 tablet by mouth Every Night.    montelukast (Singulair) 10 MG tablet Take 1 tablet by mouth Every Night.    Multiple Vitamins-Minerals (MULTIVITAMIN ADULT, MINERALS, PO) Take  by mouth.    naloxone (NARCAN) 4 MG/0.1ML nasal spray Call 911. Don't prime. [place] in 1 nostril for overdose. Repeat in 2-3 minutes in other nostril if no or minimal breathing/responsiveness.    ondansetron ODT (ZOFRAN-ODT) 4 MG disintegrating tablet Place 1 tablet on the tongue Every 8 (Eight) Hours As Needed for Nausea or Vomiting.    oxyCODONE-acetaminophen (PERCOCET) 7.5-325 MG per tablet Take 1 tablet by mouth Every 4 (Four) Hours As Needed for Moderate Pain.    ramelteon (Rozerem) 8 MG tablet Take 1 tablet by mouth Every Night.    rOPINIRole (REQUIP) 2 MG tablet Take 1 tablet by mouth every night at bedtime.    vitamin B-12 (CYANOCOBALAMIN) 1000 MCG tablet Take 1 tablet by mouth Daily.    vitamin D3 (Vitamin D) 125 MCG (5000 UT) capsule capsule Take 1 capsule by mouth Daily.     No current facility-administered medications on file prior to visit.         Review of Systems   Constitutional:  Negative for fatigue.   Respiratory:  Negative for cough, chest tightness and shortness of breath.    Cardiovascular:  Positive for palpitations. Negative for chest pain and leg swelling.   Gastrointestinal:  Negative for nausea and vomiting.   Neurological:  Negative for dizziness and syncope.        Objective   Vitals:    07/31/24 0826   BP: 124/83   Pulse: 87   Weight: 133 kg (292 lb 3.2 oz)   Height: 167.6 cm (66\")         Physical Exam  General : Alert, awake, no acute distress  Neck : Supple, no carotid bruit, no jugular venous distention  CVS : Regular rate and rhythm, no murmur, no rubs or gallops  Lungs: Clear to auscultation bilaterally, no crackles or rhonchi  Abdomen: Soft, nontender, bowel sounds active  Extremities: Warm, " "well-perfused, no pedal edema      Result Review     The following data was reviewed by CLEO Samuel  proBNP   Date Value Ref Range Status   03/07/2022 31.4 0.0 - 450.0 pg/mL Final          Lab Results   Component Value Date    TSH 2.810 04/25/2023      Lab Results   Component Value Date    FREET4 1.23 04/25/2023      No results found for: \"DDIMERQUANT\"  Magnesium   Date Value Ref Range Status   03/07/2022 2.2 1.6 - 2.6 mg/dL Final      No results found for: \"DIGOXIN\"   No results found for: \"TROPONINT\"            Results for orders placed in visit on 02/23/23    Adult Transthoracic Echo Complete w/ Color, Spectral and Contrast if necessary per protocol    Interpretation Summary  Normal left ventricular systolic function with an estimated ejection fraction of 60-65%.  No regional wall motion abnormalities were observed.  Left ventricular diastolic function is consistent with impaired relaxation (grade I diastolic dysfunction).  No hemodynamically significant valvular pathology.  Right ventricular systolic pressure could not be accurately estimated due to an insufficient TR velocity profile.    Tilt Table 2/25/23     Negative tilt table study with no significant vasodepressor or cardioinhibitory response observed and no symptoms reported during the study.  There was no evidence of postural orthostatic tachycardia syndrome (POTS).  However, the patient is noted to be on beta blocker therapy with Toprol XL 50 mg daily, which may have affected these results.  Baseline orthostatics were unremarkable.     Holter monitor   7/20/2023  Sinus tachycardia (69.2% of the total heartbeats) and sinus rhythm with an average heart rate of 107 bpm and a maximum heart rate of 158 bpm.  There was no evidence of arrhythmias or bradycardia (the minimum heart rate was 66 bpm).  No PACs or PVCs were observed.  There was no evidence of pauses or AV block.  No symptoms were reported during the study.            Assessment and Plan "   Diagnoses and all orders for this visit:    1. Tachycardia (Primary)  Assessment & Plan:  Previous Holter monitor study did not show significant ectopy or arrhythmia.  At baseline she was tachycardic with average heart rate of 107.   Symptoms reasonably well-controlled with diltiazem.  She was previously on beta-blocker but was having thinning of her hair.  Encouraged limiting caffeine, for now we will continue current dose diltiazem.      2. Essential hypertension  -     losartan (Cozaar) 50 MG tablet; Take 1 tablet by mouth Daily.  Dispense: 90 tablet; Refill: 3  -     Comprehensive Metabolic Panel; Future    Other orders  -     dilTIAZem XR (DILACOR XR) 240 MG 24 hr capsule; Take 1 capsule by mouth Daily.  Dispense: 90 capsule; Refill: 3                Follow Up   Return in about 1 year (around 7/31/2025) for with Dr. Dupont.    Patient was given instructions and counseling regarding her condition or for health maintenance advice. Please see specific information pulled into the AVS if appropriate.     Signed,  Padmaja Bentley, APRN  07/31/2024     Dictated Utilizing Dragon Dictation: Please note that portions of this note were completed with a voice recognition program.  Part of this note may be an electronic transcription/translation of spoken language to printed text using the Dragon Dictation System.

## 2024-08-01 NOTE — ASSESSMENT & PLAN NOTE
Previous Holter monitor study did not show significant ectopy or arrhythmia.  At baseline she was tachycardic with average heart rate of 107.   Symptoms reasonably well-controlled with diltiazem.  She was previously on beta-blocker but was having thinning of her hair.  Encouraged limiting caffeine, for now we will continue current dose diltiazem.

## 2024-08-01 NOTE — ASSESSMENT & PLAN NOTE
Blood pressure is well-controlled.  Continue current dose losartan and diltiazem.   She was previously on spironolactone, this was prescribed by dermatology for acne and BP control.  She is no longer taking.

## 2024-09-06 DIAGNOSIS — J45.30 MILD PERSISTENT ASTHMA WITHOUT COMPLICATION: ICD-10-CM

## 2024-09-06 RX ORDER — MONTELUKAST SODIUM 10 MG/1
10 TABLET ORAL NIGHTLY
Qty: 30 TABLET | Refills: 0 | OUTPATIENT
Start: 2024-09-06

## 2024-11-20 ENCOUNTER — APPOINTMENT (OUTPATIENT)
Dept: GENERAL RADIOLOGY | Facility: HOSPITAL | Age: 29
End: 2024-11-20
Payer: COMMERCIAL

## 2024-11-20 LAB
ALBUMIN SERPL-MCNC: 4.2 G/DL (ref 3.5–5.2)
ALBUMIN/GLOB SERPL: 1.1 G/DL
ALP SERPL-CCNC: 94 U/L (ref 39–117)
ALT SERPL W P-5'-P-CCNC: 32 U/L (ref 1–33)
ANION GAP SERPL CALCULATED.3IONS-SCNC: 15.5 MMOL/L (ref 5–15)
AST SERPL-CCNC: 27 U/L (ref 1–32)
BASOPHILS # BLD AUTO: 0.07 10*3/MM3 (ref 0–0.2)
BASOPHILS NFR BLD AUTO: 0.6 % (ref 0–1.5)
BILIRUB SERPL-MCNC: 0.8 MG/DL (ref 0–1.2)
BUN SERPL-MCNC: 17 MG/DL (ref 6–20)
BUN/CREAT SERPL: 16.7 (ref 7–25)
CALCIUM SPEC-SCNC: 9.8 MG/DL (ref 8.6–10.5)
CHLORIDE SERPL-SCNC: 100 MMOL/L (ref 98–107)
CO2 SERPL-SCNC: 20.5 MMOL/L (ref 22–29)
CREAT SERPL-MCNC: 1.02 MG/DL (ref 0.57–1)
DEPRECATED RDW RBC AUTO: 44.3 FL (ref 37–54)
EGFRCR SERPLBLD CKD-EPI 2021: 77 ML/MIN/1.73
EOSINOPHIL # BLD AUTO: 0.11 10*3/MM3 (ref 0–0.4)
EOSINOPHIL NFR BLD AUTO: 1 % (ref 0.3–6.2)
ERYTHROCYTE [DISTWIDTH] IN BLOOD BY AUTOMATED COUNT: 13.5 % (ref 12.3–15.4)
GLOBULIN UR ELPH-MCNC: 3.8 GM/DL
GLUCOSE SERPL-MCNC: 97 MG/DL (ref 65–99)
HCT VFR BLD AUTO: 44.4 % (ref 34–46.6)
HGB BLD-MCNC: 14 G/DL (ref 12–15.9)
HOLD SPECIMEN: NORMAL
HOLD SPECIMEN: NORMAL
IMM GRANULOCYTES # BLD AUTO: 0.05 10*3/MM3 (ref 0–0.05)
IMM GRANULOCYTES NFR BLD AUTO: 0.5 % (ref 0–0.5)
LIPASE SERPL-CCNC: 9 U/L (ref 13–60)
LYMPHOCYTES # BLD AUTO: 2.38 10*3/MM3 (ref 0.7–3.1)
LYMPHOCYTES NFR BLD AUTO: 22 % (ref 19.6–45.3)
MAGNESIUM SERPL-MCNC: 1.9 MG/DL (ref 1.6–2.6)
MCH RBC QN AUTO: 28.2 PG (ref 26.6–33)
MCHC RBC AUTO-ENTMCNC: 31.5 G/DL (ref 31.5–35.7)
MCV RBC AUTO: 89.5 FL (ref 79–97)
MONOCYTES # BLD AUTO: 1.12 10*3/MM3 (ref 0.1–0.9)
MONOCYTES NFR BLD AUTO: 10.4 % (ref 5–12)
NEUTROPHILS NFR BLD AUTO: 65.5 % (ref 42.7–76)
NEUTROPHILS NFR BLD AUTO: 7.09 10*3/MM3 (ref 1.7–7)
NRBC BLD AUTO-RTO: 0 /100 WBC (ref 0–0.2)
NT-PROBNP SERPL-MCNC: <36 PG/ML (ref 0–450)
PLATELET # BLD AUTO: 315 10*3/MM3 (ref 140–450)
PMV BLD AUTO: 8.9 FL (ref 6–12)
POTASSIUM SERPL-SCNC: 3.5 MMOL/L (ref 3.5–5.2)
PROT SERPL-MCNC: 8 G/DL (ref 6–8.5)
RBC # BLD AUTO: 4.96 10*6/MM3 (ref 3.77–5.28)
SODIUM SERPL-SCNC: 136 MMOL/L (ref 136–145)
TROPONIN T SERPL HS-MCNC: <6 NG/L
WBC NRBC COR # BLD AUTO: 10.82 10*3/MM3 (ref 3.4–10.8)
WHOLE BLOOD HOLD COAG: NORMAL
WHOLE BLOOD HOLD SPECIMEN: NORMAL

## 2024-11-20 PROCEDURE — 71045 X-RAY EXAM CHEST 1 VIEW: CPT

## 2024-11-20 PROCEDURE — 36415 COLL VENOUS BLD VENIPUNCTURE: CPT

## 2024-11-20 PROCEDURE — 93005 ELECTROCARDIOGRAM TRACING: CPT

## 2024-11-20 PROCEDURE — 85025 COMPLETE CBC W/AUTO DIFF WBC: CPT

## 2024-11-20 PROCEDURE — 99284 EMERGENCY DEPT VISIT MOD MDM: CPT

## 2024-11-20 PROCEDURE — 83690 ASSAY OF LIPASE: CPT

## 2024-11-20 PROCEDURE — 93005 ELECTROCARDIOGRAM TRACING: CPT | Performed by: EMERGENCY MEDICINE

## 2024-11-20 PROCEDURE — 93010 ELECTROCARDIOGRAM REPORT: CPT | Performed by: INTERNAL MEDICINE

## 2024-11-20 PROCEDURE — 84484 ASSAY OF TROPONIN QUANT: CPT

## 2024-11-20 PROCEDURE — 80053 COMPREHEN METABOLIC PANEL: CPT

## 2024-11-20 PROCEDURE — 83735 ASSAY OF MAGNESIUM: CPT

## 2024-11-20 PROCEDURE — 83880 ASSAY OF NATRIURETIC PEPTIDE: CPT

## 2024-11-20 RX ORDER — ASPIRIN 81 MG/1
324 TABLET, CHEWABLE ORAL ONCE
Status: DISCONTINUED | OUTPATIENT
Start: 2024-11-20 | End: 2024-11-21

## 2024-11-20 RX ORDER — SODIUM CHLORIDE 0.9 % (FLUSH) 0.9 %
10 SYRINGE (ML) INJECTION AS NEEDED
Status: DISCONTINUED | OUTPATIENT
Start: 2024-11-20 | End: 2024-11-21 | Stop reason: HOSPADM

## 2024-11-21 ENCOUNTER — HOSPITAL ENCOUNTER (EMERGENCY)
Facility: HOSPITAL | Age: 29
Discharge: HOME | End: 2024-11-21
Attending: EMERGENCY MEDICINE | Admitting: EMERGENCY MEDICINE
Payer: COMMERCIAL

## 2024-11-21 VITALS
SYSTOLIC BLOOD PRESSURE: 147 MMHG | HEART RATE: 110 BPM | OXYGEN SATURATION: 100 % | TEMPERATURE: 98.8 F | HEIGHT: 66 IN | BODY MASS INDEX: 47.09 KG/M2 | DIASTOLIC BLOOD PRESSURE: 78 MMHG | RESPIRATION RATE: 16 BRPM | WEIGHT: 293 LBS

## 2024-11-21 DIAGNOSIS — R00.2 PALPITATION: ICD-10-CM

## 2024-11-21 DIAGNOSIS — F41.0 PANIC ATTACK: ICD-10-CM

## 2024-11-21 DIAGNOSIS — F41.9 ANXIETY: Primary | ICD-10-CM

## 2024-11-21 LAB
GEN 5 2HR TROPONIN T REFLEX: <6 NG/L
QT INTERVAL: 288 MS
QT INTERVAL: 311 MS
QTC INTERVAL: 436 MS
QTC INTERVAL: 442 MS
TROPONIN T DELTA: NORMAL

## 2024-11-21 PROCEDURE — 84484 ASSAY OF TROPONIN QUANT: CPT | Performed by: EMERGENCY MEDICINE

## 2024-11-21 PROCEDURE — 96374 THER/PROPH/DIAG INJ IV PUSH: CPT

## 2024-11-21 PROCEDURE — 36415 COLL VENOUS BLD VENIPUNCTURE: CPT

## 2024-11-21 PROCEDURE — 25010000002 LORAZEPAM PER 2 MG: Performed by: EMERGENCY MEDICINE

## 2024-11-21 RX ORDER — LORAZEPAM 3 MG/1
1 CAPSULE, EXTENDED RELEASE ORAL DAILY
Qty: 6 CAPSULE | Refills: 0 | Status: SHIPPED | OUTPATIENT
Start: 2024-11-21 | End: 2024-11-21

## 2024-11-21 RX ORDER — LORAZEPAM 2 MG/ML
1 INJECTION INTRAMUSCULAR ONCE
Status: COMPLETED | OUTPATIENT
Start: 2024-11-21 | End: 2024-11-21

## 2024-11-21 RX ORDER — LORAZEPAM 1 MG/1
1 TABLET ORAL 2 TIMES DAILY PRN
Qty: 6 TABLET | Refills: 0 | Status: SHIPPED | OUTPATIENT
Start: 2024-11-21

## 2024-11-21 RX ADMIN — LORAZEPAM 1 MG: 2 INJECTION INTRAMUSCULAR; INTRAVENOUS at 02:43

## 2024-11-21 NOTE — ED PROVIDER NOTES
Time: 11:31 PM EST  Date of encounter:  11/20/2024  Independent Historian/Clinical History and Information was obtained by:   Patient    History is limited by: N/A    Chief Complaint   Patient presents with    Panic Attack    Chest Pain     Pt arrives via PV with c/o chest pressure, palpitations, difficulty concentrating, dry mouth that started today. Pt states she usually takes 3mg ativan extended release every morning but she lost the bottle so she has not taken it 48 hours. Pt was recently put on adderall on Monday         History of Present Illness:  Patient is a 29 y.o. year old female who presents to the emergency department for evaluation of palpitations, anxiety, chest pain. States that it has been on and off today. At one point, she felt like she had something sitting on her chest. She does have a hx of anxiety. She takes ativan 3mg ER daily and has been on this for a year. She also takes lamictal and latuda for anxiety. She states that she cannot find her Ativan bottle. She's been without it for 48 hours. She did try calling her psychiatrist today but it was after hours. She saw her psychiatrist, CLEO Portillo, this past Monday. (Triage Provider: Eugene Rubalcava PA-C).      Patient Care Team  Primary Care Provider: Sonya Squires APRN    Past Medical History:     No Known Allergies  Past Medical History:   Diagnosis Date    Acid reflux     Alcoholism in remission 02/05/2021    Anxiety disorder 01/05/2021    Asthma     uses inhaler    Constipation     Encounter for blood transfusion     H/O psychiatric care     Hypertension     Left anterior cruciate ligament tear     Major depression 02/05/2021    Obesity     Onychomycosis     PONV (postoperative nausea and vomiting)     Prediabetes     Tachycardia     follows with [facility]     Past Surgical History:   Procedure Laterality Date    DILATATION AND CURETTAGE      KNEE ACL RECONSTRUCTION Left 2/21/2022    Procedure: LEFT KNEE ATHROSCOPIC ANTERIOR  CRUCIATE LIGAMENT RECONSTRUCTION  WITH  ALLOGRAFT;  Surgeon: Troy Hsu MD;  [place] [facility] [place] [facility] [place]  Service: [place]opedics;  Laterality: Left;    KNEE ACL RECONSTRUCTION Right 4/15/2024    Procedure: KNEE ANTERIOR CRUCIATE LIGAMENT RECONSTRUCTION WITH ALLOGRAFT LATERAL RETINACULAR RELEASE;  Surgeon: Troy Hsu MD;  [place] [facility] [place] [facility] [place]  Service: Orthopedics;  Laterality: Right;    WISDOM TOOTH EXTRACTION  2012     Family History   Problem Relation Age of Onset    Atrial fibrillation Father     Heart failure Paternal Grandfather     Heart failure Paternal Grandmother     Breast cancer Paternal Grandmother     Heart failure Maternal Grandmother     Heart failure Maternal Grandfather     Breast cancer Paternal Aunt     Breast cancer Paternal Great-Grandmother     Ovarian cancer Neg Hx     Uterine cancer Neg Hx     Prostate cancer Neg Hx     Colon cancer Neg Hx        Home Medications:  Prior to Admission medications    Medication Sig Start Date End Date Taking? Authorizing Provider   amphetamine-dextroamphetamine (Adderall) 10 MG tablet Take 1 tablet by mouth 2 (Two) Times a Day. 11/18/24      aspirin (Ecotrin) 325 MG EC tablet Take 1 tablet by mouth Daily. 4/15/24   Troy Hsu MD   dilTIAZem XR (DILACOR XR) 240 MG 24 hr capsule Take 1 capsule by mouth Daily. 7/31/24   Padmaja Bentley APRN   eszopiclone (Lunesta) 2 MG tablet Take 1 tablet by mouth At Night As Needed. Take immediately before bedtime 4/2/24      famotidine (Pepcid) 20 MG tablet Take 1 tablet by mouth 2 (Two) Times a Day As Needed for Indigestion. 8/22/23   Sonya Squires APRN   Fluticasone-Salmeterol (Advair Diskus) 250-50 MCG/ACT DISKUS Inhale 1 puff 2 (Two) Times a Day. For asthma. Rinse mouth after use 12/1/23   Sonya Squires APRN   folic acid (FOLVITE) 1 MG tablet Take 1 tablet by mouth Daily. 4/26/23   Sonya Squires APRN   ibuprofen (ADVIL,MOTRIN) 800 MG tablet Take 1 tablet by mouth 3  (Three) Times a Day. 3/28/24   Troy Hsu MD   lamoTRIgine (LaMICtal) 100 MG tablet Take 1 tablet by mouth 2 (Two) Times a Day.  Patient taking differently: Take 1 tablet by mouth 2 (Two) Times a Day. Only takes at night 8/17/23      lamoTRIgine (LaMICtal) 100 MG tablet Take 1 tablet by mouth 2 (Two) Times a Day. 12/5/23      lamoTRIgine (LaMICtal) 100 MG tablet Take 1 tablet by mouth 2 (Two) Times a Day. 5/16/24      lamoTRIgine (LaMICtal) 100 MG tablet Take 1 tablet by mouth 2 (Two) Times a Day. 11/18/24      levalbuterol (XOPENEX HFA) 45 MCG/ACT inhaler Inhale 1-2 puffs Every 4 (Four) Hours As Needed for Wheezing or Shortness of Air. 10/7/22   Sonya Squires APRN   levalbuterol (Xopenex) 1.25 MG/3ML nebulizer solution Take 1 ampule by nebulization Every 4 (Four) Hours As Needed for Wheezing or Shortness of Air. 4/25/23   Sonya Squires APRN   LORazepam ER (Loreev XR) 3 MG capsule extended-release 24 hour sprinkle Take 1 capsule by mouth once daily 10/1/24      LORazepam ER (Loreev XR) 3 MG capsule extended-release 24 hour sprinkle Take 1 capsule by mouth Daily. 11/5/24      losartan (Cozaar) 50 MG tablet Take 1 tablet by mouth Daily. 7/31/24   Padmaja Bentley APRN   lurasidone (Latuda) 40 MG tablet tablet Take 1 tablet by mouth every night at bedtime. 2/29/24      lurasidone (Latuda) 40 MG tablet tablet Take 1 tablet by mouth Every Night. 5/16/24      lurasidone (Latuda) 40 MG tablet tablet Take 1 tablet by mouth every night at bedtime. 11/18/24      methylPREDNISolone (MEDROL) 4 MG dose pack Take as directed on package instructions. 8/19/24   Haydee Toscano APRN   montelukast (Singulair) 10 MG tablet Take 1 tablet by mouth Every Night. 7/25/24   Sonya Squires APRN   Multiple Vitamins-Minerals (MULTIVITAMIN ADULT, MINERALS, PO) Take  by mouth.    Provider, MD Mica   naloxone (NARCAN) 4 MG/0.1ML nasal spray Call 911. Don't prime. [place] in 1 nostril for overdose. Repeat  in 2-3 minutes in other nostril if no or minimal breathing/responsiveness. 4/15/24   Troy Hsu MD   ondansetron ODT (ZOFRAN-ODT) 4 MG disintegrating tablet Place 1 tablet on the tongue Every 8 (Eight) Hours As Needed for Nausea or Vomiting. 4/15/24   Troy Hsu MD   oxyCODONE-acetaminophen (PERCOCET) 7.5-325 MG per tablet Take 1 tablet by mouth Every 4 (Four) Hours As Needed for Moderate Pain. 24   Troy Hsu MD   ramelteon (Rozerem) 8 MG tablet Take 1 tablet by mouth Every Night. 24      ramelteon (Rozerem) 8 MG tablet Take 1 tablet by mouth every night at bedtime. 24      rOPINIRole (REQUIP) 2 MG tablet Take 1 tablet by mouth every night at bedtime. 24      vitamin B-12 (CYANOCOBALAMIN) 1000 MCG tablet Take 1 tablet by mouth Daily. 23   Sonya Squires APRN   vitamin D3 (Vitamin D) 125 MCG (5000 UT) capsule capsule Take 1 capsule by mouth Daily. 23   Sonya Squires APRN        Social History:   Social History     Tobacco Use    Smoking status: Former     Current packs/day: 0.00     Average packs/day: 0.5 packs/day for 6.0 years (3.0 ttl pk-yrs)     Types: Cigarettes     Start date: 10/19/2010     Quit date: 10/19/2016     Years since quittin.0     Passive exposure: Past    Smokeless tobacco: Never   Vaping Use    Vaping status: Never Used   Substance Use Topics    Alcohol use: Not Currently     Comment: LAST DRINK 2021 alcoholism in remission    Drug use: Never         Review of Systems:  Review of Systems   Constitutional:  Negative for chills and fever.   HENT:  Negative for ear pain.    Eyes:  Negative for pain.   Respiratory:  Negative for cough and shortness of breath.    Cardiovascular:  Positive for palpitations. Negative for chest pain.   Gastrointestinal:  Negative for abdominal pain, diarrhea, nausea and vomiting.   Genitourinary:  Negative for dysuria.   Musculoskeletal:  Negative for arthralgias.   Skin:  Negative for  "rash.   Neurological:  Negative for headaches.        Physical Exam:  /78   Pulse 110   Temp 98.8 °F (37.1 °C)   Resp 16   Ht 167.6 cm (66\")   Wt (!) 136 kg (300 lb 7.8 oz)   LMP 11/17/2024 (Exact Date)   SpO2 100%   BMI 48.50 kg/m²         Physical Exam  Vitals and nursing note reviewed.   Constitutional:       Appearance: Normal appearance.   HENT:      Head: Normocephalic and atraumatic.      Nose: Nose normal.   Eyes:      Extraocular Movements: Extraocular movements intact.      Conjunctiva/sclera: Conjunctivae normal.      Pupils: Pupils are equal, round, and reactive to light.   Cardiovascular:      Rate and Rhythm: Regular rhythm. Tachycardia present.      Pulses: Normal pulses.      Heart sounds: Normal heart sounds.   Pulmonary:      Effort: Pulmonary effort is normal.      Breath sounds: Normal breath sounds.   Abdominal:      General: Abdomen is flat.      Palpations: Abdomen is soft.   Musculoskeletal:         General: Normal range of motion.      Cervical back: Normal range of motion and neck supple.   Skin:     General: Skin is warm and dry.   Neurological:      General: No focal deficit present.      Mental Status: She is alert and oriented to person, place, and time.   Psychiatric:         Mood and Affect: Mood normal.         Behavior: Behavior normal.                      Procedures:  Procedures      Medical Decision Making:      Comorbidities that affect care:    Anxiety disorder, major depression, obesity, asthma    External Notes reviewed:    None      The following orders were placed and all results were independently analyzed by me:  Orders Placed This Encounter   Procedures    XR Chest 1 View    [place] Draw    High Sensitivity Troponin T    Comprehensive Metabolic Panel    Lipase    BNP    Magnesium    CBC Auto Differential    High Sensitivity Troponin T 2Hr    Undress & Gown    Continuous Pulse Oximetry    ECG 12 Lead ED Triage Standing Order; Chest Pain    CBC & Differential "    Green Top (Gel)    Lavender Top    Gold Top - SST    Light Blue Top       Medications Given in the Emergency Department:  Medications   LORazepam (ATIVAN) injection 1 mg (1 mg Intravenous Given 11/21/24 0243)        ED Course:    The patient was initially evaluated in the triage area where orders were placed. The patient was later dispositioned by AMY Zapata.      The patient was advised to stay for completion of workup which includes but is not limited to communication of labs and radiological results, reassessment and plan. The patient was advised that leaving prior to disposition by a provider could result in critical findings that are not communicated to the patient.     ED Course as of 11/21/24 1506   Wed Nov 20, 2024   2329 On eval, HR is still in the 140s [MV]   2334 PROVIDER IN TRIAGE  Patient was evaluated by Eugene bedoya PA-C. Orders were placed and awaiting final results and disposition.   [MV]      ED Course User Index  [MV] Eugene Rubalcava PA       Labs:    Lab Results (last 24 hours)       Procedure Component Value Units Date/Time    High Sensitivity Troponin T [[id number]]  (Normal) Collected: 11/20/24 2159    Specimen: Blood from Arm, Right Updated: 11/20/24 2230     HS Troponin T <6 ng/L     Narrative:      High Sensitive Troponin T Reference Range:  <14.0 ng/L- Negative Female for AMI  <22.0 ng/L- Negative Male for AMI  >=14 - Abnormal Female indicating possible myocardial injury.  >=22 - Abnormal Male indicating possible myocardial injury.   Clinicians would have to utilize clinical acumen, EKG, Troponin, and serial changes to determine if it is an Acute Myocardial Infarction or myocardial injury due to an underlying chronic condition.         CBC & Differential [[id number]]  (Abnormal) Collected: 11/20/24 2159    Specimen: Blood from Arm, Right Updated: 11/20/24 2207    Narrative:      The following orders were created for panel order CBC & Differential.  Procedure                                Abnormality         Status                     ---------                               -----------         ------                     CBC Auto Differential[[id number]]        Abnormal            Final result                 Please view results for these tests on the individual orders.    Comprehensive Metabolic Panel [[id number]]  (Abnormal) Collected: 11/20/24 2159    Specimen: Blood from Arm, Right Updated: 11/20/24 2230     Glucose 97 mg/dL      BUN 17 mg/dL      Creatinine 1.02 mg/dL      Sodium 136 mmol/L      Potassium 3.5 mmol/L      Chloride 100 mmol/L      CO2 20.5 mmol/L      Calcium 9.8 mg/dL      Total Protein 8.0 g/dL      Albumin 4.2 g/dL      ALT (SGPT) 32 U/L      AST (SGOT) 27 U/L      Alkaline Phosphatase 94 U/L      Total Bilirubin 0.8 mg/dL      Globulin 3.8 gm/dL      A/G Ratio 1.1 g/dL      BUN/Creatinine Ratio 16.7     Anion Gap 15.5 mmol/L      eGFR 77.0 mL/min/1.73     Narrative:      GFR Normal >60  Chronic Kidney Disease <60  Kidney Failure <15      Lipase [[id number]]  (Abnormal) Collected: 11/20/24 2159    Specimen: Blood from Arm, Right Updated: 11/20/24 2230     Lipase 9 U/L     BNP [[id number]]  (Normal) Collected: 11/20/24 2159    Specimen: Blood from Arm, Right Updated: 11/20/24 2225     proBNP <36.0 pg/mL     Narrative:      This assay is used as an aid in the diagnosis of individuals suspected of having heart failure. It can be used as an aid in the diagnosis of acute decompensated heart failure (ADHF) in patients presenting with signs and symptoms of ADHF to the emergency department (ED). In addition, NT-proBNP of <300 pg/mL indicates ADHF is not likely.    Age Range Result Interpretation  NT-proBNP Concentration (pg/mL:      <50             Positive            >450                   Gray                 300-450                    Negative             <300    50-75           Positive            >900                  Gray                300-900                  Negative             <300      >75             Positive            >1800                  Gray                300-1800                  Negative            <300    Magnesium [[id number]]  (Normal) Collected: 11/20/24 2159    Specimen: Blood from Arm, Right Updated: 11/20/24 2230     Magnesium 1.9 mg/dL     CBC Auto Differential [[id number]]  (Abnormal) Collected: 11/20/24 2159    Specimen: Blood from Arm, Right Updated: 11/20/24 2207     WBC 10.82 10*3/mm3      RBC 4.96 10*6/mm3      Hemoglobin 14.0 g/dL      Hematocrit 44.4 %      MCV 89.5 fL      MCH 28.2 pg      MCHC 31.5 g/dL      RDW 13.5 %      RDW-SD 44.3 fl      MPV 8.9 fL      Platelets 315 10*3/mm3      Neutrophil % 65.5 %      Lymphocyte % 22.0 %      Monocyte % 10.4 %      Eosinophil % 1.0 %      Basophil % 0.6 %      Immature Grans % 0.5 %      Neutrophils, Absolute 7.09 10*3/mm3      Lymphocytes, Absolute 2.38 10*3/mm3      Monocytes, Absolute 1.12 10*3/mm3      Eosinophils, Absolute 0.11 10*3/mm3      Basophils, Absolute 0.07 10*3/mm3      Immature Grans, Absolute 0.05 10*3/mm3      nRBC 0.0 /100 WBC     High Sensitivity Troponin T 2Hr [[id number]] Collected: 11/21/24 0112    Specimen: Blood from Arm, Right Updated: 11/21/24 0210     HS Troponin T <6 ng/L      Troponin T Delta --     Comment: Unable to calculate.       Narrative:      High Sensitive Troponin T Reference Range:  <14.0 ng/L- Negative Female for AMI  <22.0 ng/L- Negative Male for AMI  >=14 - Abnormal Female indicating possible myocardial injury.  >=22 - Abnormal Male indicating possible myocardial injury.   Clinicians would have to utilize clinical acumen, EKG, Troponin, and serial changes to determine if it is an Acute Myocardial Infarction or myocardial injury due to an underlying chronic condition.                  Imaging:    XR Chest 1 View    Result Date: 11/20/2024  XR CHEST 1 VW Date of Exam: 11/20/2024 9:59 PM EST Indication: Chest Pain Triage Protocol Comparison: 10/26/2023 Findings: Cardiomediastinal  silhouette is within normal limits. No focal opacity, pleural effusion or pneumothorax. No evidence of acute osseous abnormalities. Visualized upper abdomen is unremarkable.     Impression: No radiographic evidence of acute cardiopulmonary process. Electronically Signed: Ubaldo Israel MD  11/20/2024 10:26 PM EST  Workstation ID: [id number]       Differential Diagnosis and Discussion:      Palpitations: Differential diagnosis includes but is not limited to anxiety, atrioventricular blocks, mitral valve disease, hypoxia, coronary artery disease, hypokalemia, anemia, fever, COPD, congestive heart failure, pericarditis, César-Parkinson-White syndrome, pulmonary embolism, SVT, atrial fibrillation, atrial flutter, sinus tachycardia, thyrotoxicosis, and pheochromocytoma.    All labs were reviewed and interpreted by me.  All X-rays impressions were independently interpreted by me.  EKG was interpreted by supervising attending.    MDM     Amount and/or Complexity of Data Reviewed  Clinical lab tests: reviewed  Tests in the radiology section of CPT®: reviewed    Risk of Complications, Morbidity, and/or Mortality  Presenting problems: moderate  Diagnostic procedures: moderate  Management options: low    Patient Progress  Patient progress: stable    Patient presents to the emergency department for evaluation of palpitations, anxiety, chest pain. States that it has been on and off today. At one point, she felt like she had something sitting on her chest. She does have a hx of anxiety. She takes ativan 3mg ER daily and has been on this for a year. She also takes lamictal and latuda for anxiety. She states that she cannot find her Ativan bottle. She's been without it for 48 hours. She did try calling her psychiatrist today but it was after hours. She saw her psychiatrist, CLEO Portillo, this past Monday.     The patient´s CBC that was reviewed and interpreted by me shows no abnormalities of critical concern. Of note, there  is no anemia requiring a blood transfusion and the platelet count is acceptable.  The patient´s CMP that was reviewed and interpretted by me shows no abnormalities of critical concern. Of note, the patient´s sodium and potassium are acceptable. The patient´s liver enzymes are unremarkable. The patient´s renal function (creatinine) is preserved. The patient has a normal anion gap.    Troponin, BNP, magnesium, lipase are all within normal limits.    EKG shows sinus tachycardia.    XR Chest 1 View   Final Result   Impression:   No radiographic evidence of acute cardiopulmonary process.         Electronically Signed: Ubaldo Israel MD     11/20/2024 10:26 PM EST     Workstation ID: [id number]        Started the patient on Ativan 1 mg.  Patient's heart rate has started trending down.  It has gone as low as 95 bpm.  On reevaluation, patient denies any chest pain.  States that she is feeling better.    Discussed this patient with Dr. Wesley.  She is okay in discharging the patient with small amounts of her prescribed ativan until she sees her psychiatrist. Will refill her ativan 3mg ER for 6 days.    Follow up with her PCP in 3-5 day.s              Patient Care Considerations:    ANTIBIOTICS: I considered prescribing antibiotics as an outpatient however no bacterial focus of infection was found.      Consultants/Shared Management Plan:    SHARED VISIT: I have discussed the case with my supervising physician, Dr. Wesley who statesthat patient is okay for dc. Okay to dc with small amounts of home dose ativan. The substantive portion of the medical decision was made by the attesting physician who made or approve the management plan and will take responsibility for the patient.  Clinical findings were discussed and ultimate disposition was made in consult with supervising physician.    Social Determinants of Health:    Patient is independent, reliable, and has access to care.       Disposition and Care  Coordination:    Discharged: The patient is suitable and stable for discharge with no need for consideration of admission.    I have explained the patient´s condition, diagnoses and treatment plan based on the information available to me at this time. I have answered questions and addressed any concerns. The patient has a good  understanding of the patient´s diagnosis, condition, and treatment plan as can be expected at this point. The vital signs have been stable. The patient´s condition is stable and appropriate for discharge from the emergency department.      The patient will pursue further outpatient evaluation with the primary care physician or other designated or consulting physician as outlined in the discharge instructions. They are agreeable to this plan of care and follow-up instructions have been explained in detail. The patient has received these instructions in written format and has expressed an understanding of the discharge instructions. The patient is aware that any significant change in condition or worsening of symptoms should prompt an immediate return to this or the closest emergency department or call to 911.  I have explained discharge medications and the need for follow up with the patient/caretakers. This was also printed in the discharge instructions. Patient was discharged with the following medications and follow up:      Medication List        Changed      * lamoTRIgine 100 MG tablet  Commonly known as: LaMICtal  Take 1 tablet by mouth 2 (Two) Times a Day.  What changed: additional instructions     * lamoTRIgine 100 MG tablet  Commonly known as: LaMICtal  Take 1 tablet by mouth 2 (Two) Times a Day.  What changed: Another medication with the same name was changed. Make sure you understand how and when to take each.     * lamoTRIgine 100 MG tablet  Commonly known as: LaMICtal  Take 1 tablet by mouth 2 (Two) Times a Day.  What changed: Another medication with the same name was changed.  Make sure you understand how and when to take each.     * lamoTRIgine 100 MG tablet  Commonly known as: LaMICtal  Take 1 tablet by mouth 2 (Two) Times a Day.  What changed: Another medication with the same name was changed. Make sure you understand how and when to take each.     * Loreev XR 3 MG capsule extended-release 24 hour sprinkle  Generic drug: LORazepam ER  Take 1 capsule by mouth once daily  What changed:   Another medication with the same name was added. Make sure you understand how and when to take each.  Another medication with the same name was removed. Continue taking this medication, and follow the directions you see here.     * LORazepam 1 MG tablet  Commonly known as: ATIVAN  Take 1 tablet by mouth 2 (Two) Times a Day As Needed for Anxiety.  What changed: You were already taking a medication with the same name, and this prescription was added. Make sure you understand how and when to take each.  Replaces: Loreev XR 3 MG capsule extended-release 24 hour sprinkle           * This list has 6 medication(s) that are the same as other medications prescribed for you. Read the directions carefully, and ask your doctor or other care provider to review them with you.                Stop      Loreev XR 3 MG capsule extended-release 24 hour sprinkle  Generic drug: LORazepam ER  Replaced by: LORazepam 1 MG tablet  You also have another medication with the same name that you need to continue taking as instructed.               Where to Get Your Medications        These medications were sent to [facility]/pharmacy #[id number] - [place], [place] - [address] - [phone] [facility] [phone]   [address] [place] [address]      Hours: 24-hours Phone: [phone]   LORazepam 1 MG tablet      No follow-up provider specified.     Final diagnoses:   Anxiety   Panic attack   Palpitation        ED Disposition       ED Disposition   Discharge    Condition   Stable    Comment   --               This medical record  created using voice recognition software.             Eugene Rubalcava PA  11/21/24 5463

## 2024-11-21 NOTE — ED PROVIDER NOTES
"SHARED VISIT NOTE:    Patient is 29 y.o. year old female that presents to the ED for evaluation of palpitations, anxiety.     Physical Exam    ED Course:    /87   Pulse 109   Temp 98.8 °F (37.1 °C) (Oral)   Resp 17   Ht 167.6 cm (66\")   Wt (!) 136 kg (300 lb 7.8 oz)   LMP 11/17/2024 (Exact Date)   SpO2 98%   BMI 48.50 kg/m²   Results for orders placed or performed during the hospital encounter of 11/21/24   ECG 12 Lead ED Triage Standing Order; Chest Pain    Collection Time: 11/20/24  9:52 PM   Result Value Ref Range    QT Interval 288 ms    QTC Interval 436 ms   High Sensitivity Troponin T    Collection Time: 11/20/24  9:59 PM    Specimen: Arm, Right; Blood   Result Value Ref Range    HS Troponin T <6 <14 ng/L   Comprehensive Metabolic Panel    Collection Time: 11/20/24  9:59 PM    Specimen: Arm, Right; Blood   Result Value Ref Range    Glucose 97 65 - 99 mg/dL    BUN 17 6 - 20 mg/dL    Creatinine 1.02 (H) 0.57 - 1.00 mg/dL    Sodium 136 136 - 145 mmol/L    Potassium 3.5 3.5 - 5.2 mmol/L    Chloride 100 98 - 107 mmol/L    CO2 20.5 (L) 22.0 - 29.0 mmol/L    Calcium 9.8 8.6 - 10.5 mg/dL    Total Protein 8.0 6.0 - 8.5 g/dL    Albumin 4.2 3.5 - 5.2 g/dL    ALT (SGPT) 32 1 - 33 U/L    AST (SGOT) 27 1 - 32 U/L    Alkaline Phosphatase 94 39 - 117 U/L    Total Bilirubin 0.8 0.0 - 1.2 mg/dL    Globulin 3.8 gm/dL    A/G Ratio 1.1 g/dL    BUN/Creatinine Ratio 16.7 7.0 - 25.0    Anion Gap 15.5 (H) 5.0 - 15.0 mmol/L    eGFR 77.0 >60.0 mL/min/1.73   Lipase    Collection Time: 11/20/24  9:59 PM    Specimen: Arm, Right; Blood   Result Value Ref Range    Lipase 9 (L) 13 - 60 U/L   BNP    Collection Time: 11/20/24  9:59 PM    Specimen: Arm, Right; Blood   Result Value Ref Range    proBNP <36.0 0.0 - 450.0 pg/mL   Magnesium    Collection Time: 11/20/24  9:59 PM    Specimen: Arm, Right; Blood   Result Value Ref Range    Magnesium 1.9 1.6 - 2.6 mg/dL   CBC Auto Differential    Collection Time: 11/20/24  9:59 PM    " Specimen: Arm, Right; Blood   Result Value Ref Range    WBC 10.82 (H) 3.40 - 10.80 10*3/mm3    RBC 4.96 3.77 - 5.28 10*6/mm3    Hemoglobin 14.0 12.0 - 15.9 g/dL    Hematocrit 44.4 34.0 - 46.6 %    MCV 89.5 79.0 - 97.0 fL    MCH 28.2 26.6 - 33.0 pg    MCHC 31.5 31.5 - 35.7 g/dL    RDW 13.5 12.3 - 15.4 %    RDW-SD 44.3 37.0 - 54.0 fl    MPV 8.9 6.0 - 12.0 fL    Platelets 315 140 - 450 10*3/mm3    Neutrophil % 65.5 42.7 - 76.0 %    Lymphocyte % 22.0 19.6 - 45.3 %    Monocyte % 10.4 5.0 - 12.0 %    Eosinophil % 1.0 0.3 - 6.2 %    Basophil % 0.6 0.0 - 1.5 %    Immature Grans % 0.5 0.0 - 0.5 %    Neutrophils, Absolute 7.09 (H) 1.70 - 7.00 10*3/mm3    Lymphocytes, Absolute 2.38 0.70 - 3.10 10*3/mm3    Monocytes, Absolute 1.12 (H) 0.10 - 0.90 10*3/mm3    Eosinophils, Absolute 0.11 0.00 - 0.40 10*3/mm3    Basophils, Absolute 0.07 0.00 - 0.20 10*3/mm3    Immature Grans, Absolute 0.05 0.00 - 0.05 10*3/mm3    nRBC 0.0 0.0 - 0.2 /100 WBC   Green Top (Gel)    Collection Time: 11/20/24  9:59 PM   Result Value Ref Range    Extra Tube Hold for add-ons.    Lavender Top    Collection Time: 11/20/24  9:59 PM   Result Value Ref Range    Extra Tube hold for add-on    Gold Top - SST    Collection Time: 11/20/24  9:59 PM   Result Value Ref Range    Extra Tube Hold for add-ons.    Light Blue Top    Collection Time: 11/20/24  9:59 PM   Result Value Ref Range    Extra Tube Hold for add-ons.    ECG 12 Lead ED Triage Standing Order; Chest Pain    Collection Time: 11/20/24 11:39 PM   Result Value Ref Range    QT Interval 311 ms    QTC Interval 442 ms   High Sensitivity Troponin T 2Hr    Collection Time: 11/21/24  1:12 AM    Specimen: Arm, Right; Blood   Result Value Ref Range    HS Troponin T <6 <14 ng/L    Troponin T Delta       Medications   sodium chloride 0.9 % flush 10 mL (has no administration in time range)   aspirin chewable tablet 324 mg (has no administration in time range)   LORazepam (ATIVAN) injection 1 mg (1 mg Intravenous Given  11/21/24 0243)     XR Chest 1 View    Result Date: 11/20/2024  Narrative: XR CHEST 1 VW Date of Exam: 11/20/2024 9:59 PM EST Indication: Chest Pain Triage Protocol Comparison: 10/26/2023 Findings: Cardiomediastinal silhouette is within normal limits. No focal opacity, pleural effusion or pneumothorax. No evidence of acute osseous abnormalities. Visualized upper abdomen is unremarkable.     Impression: Impression: No radiographic evidence of acute cardiopulmonary process. Electronically Signed: Ubaldo Israel MD  11/20/2024 10:26 PM EST  Workstation ID: [id number]     MDM:    Procedures    All labs were reviewed and interpreted by me.  All X-rays impressions were independently interpreted by me.  EKG was interpreted by me.          SHARED VISIT ATTESTATION:    This visit was performed by both myself and an APC.  I performed the substantive portion of the medical decision making. The management plan was made or approved by me, and I take responsibility for patient management.           Mag Wesley MD  03:36 EST  11/21/24         Mag Wesley MD  11/21/24 0336

## 2024-11-21 NOTE — Clinical Note
[facility] EMERGENCY ROOM  [address] [place] [address]  Phone: [phone]  Fax: [phone]    Zhane Traylor was seen and treated in our emergency department on 11/20/2024.  She may return to work on 11/25/2024.         Thank you for choosing [facility].    Mag Wesley MD

## 2024-12-06 ENCOUNTER — OFFICE VISIT (OUTPATIENT)
Dept: FAMILY MEDICINE CLINIC | Facility: CLINIC | Age: 29
End: 2024-12-06
Payer: COMMERCIAL

## 2024-12-06 VITALS
OXYGEN SATURATION: 100 % | BODY MASS INDEX: 47.09 KG/M2 | SYSTOLIC BLOOD PRESSURE: 123 MMHG | TEMPERATURE: 96.4 F | WEIGHT: 293 LBS | HEART RATE: 91 BPM | DIASTOLIC BLOOD PRESSURE: 81 MMHG | HEIGHT: 66 IN

## 2024-12-06 DIAGNOSIS — I10 PRIMARY HYPERTENSION: ICD-10-CM

## 2024-12-06 DIAGNOSIS — R73.03 PREDIABETES: ICD-10-CM

## 2024-12-06 DIAGNOSIS — E53.8 FOLATE DEFICIENCY: ICD-10-CM

## 2024-12-06 DIAGNOSIS — R11.2 NAUSEA AND VOMITING, UNSPECIFIED VOMITING TYPE: ICD-10-CM

## 2024-12-06 DIAGNOSIS — E55.9 VITAMIN D DEFICIENCY: ICD-10-CM

## 2024-12-06 DIAGNOSIS — Z12.31 ENCOUNTER FOR SCREENING MAMMOGRAM FOR MALIGNANT NEOPLASM OF BREAST: ICD-10-CM

## 2024-12-06 DIAGNOSIS — E66.813 CLASS 3 SEVERE OBESITY WITH SERIOUS COMORBIDITY AND BODY MASS INDEX (BMI) OF 45.0 TO 49.9 IN ADULT, UNSPECIFIED OBESITY TYPE: ICD-10-CM

## 2024-12-06 DIAGNOSIS — J45.40 MODERATE PERSISTENT ASTHMA WITHOUT COMPLICATION: ICD-10-CM

## 2024-12-06 DIAGNOSIS — E53.8 VITAMIN B12 DEFICIENCY: ICD-10-CM

## 2024-12-06 DIAGNOSIS — Z23 NEED FOR PNEUMOCOCCAL 20-VALENT CONJUGATE VACCINATION: ICD-10-CM

## 2024-12-06 DIAGNOSIS — Z00.00 ANNUAL PHYSICAL EXAM: Primary | ICD-10-CM

## 2024-12-06 DIAGNOSIS — Z80.3 FAMILY HISTORY OF BREAST CANCER: ICD-10-CM

## 2024-12-06 DIAGNOSIS — E66.01 CLASS 3 SEVERE OBESITY WITH SERIOUS COMORBIDITY AND BODY MASS INDEX (BMI) OF 45.0 TO 49.9 IN ADULT, UNSPECIFIED OBESITY TYPE: ICD-10-CM

## 2024-12-06 DIAGNOSIS — M72.2 PLANTAR FASCIITIS, BILATERAL: ICD-10-CM

## 2024-12-06 DIAGNOSIS — Z23 NEED FOR INFLUENZA VACCINATION: ICD-10-CM

## 2024-12-06 DIAGNOSIS — Z11.59 NEED FOR HEPATITIS C SCREENING TEST: ICD-10-CM

## 2024-12-06 LAB
25(OH)D3 SERPL-MCNC: 21.6 NG/ML (ref 30–100)
ALBUMIN SERPL-MCNC: 3.5 G/DL (ref 3.5–5.2)
ALBUMIN/GLOB SERPL: 0.8 G/DL
ALP SERPL-CCNC: 85 U/L (ref 39–117)
ALT SERPL W P-5'-P-CCNC: 17 U/L (ref 1–33)
ANION GAP SERPL CALCULATED.3IONS-SCNC: 12.6 MMOL/L (ref 5–15)
AST SERPL-CCNC: 17 U/L (ref 1–32)
BASOPHILS # BLD AUTO: 0.06 10*3/MM3 (ref 0–0.2)
BASOPHILS NFR BLD AUTO: 0.8 % (ref 0–1.5)
BILIRUB SERPL-MCNC: 0.3 MG/DL (ref 0–1.2)
BUN SERPL-MCNC: 16 MG/DL (ref 6–20)
BUN/CREAT SERPL: 17.2 (ref 7–25)
CALCIUM SPEC-SCNC: 9.2 MG/DL (ref 8.6–10.5)
CHLORIDE SERPL-SCNC: 106 MMOL/L (ref 98–107)
CHOLEST SERPL-MCNC: 165 MG/DL (ref 0–200)
CO2 SERPL-SCNC: 22.4 MMOL/L (ref 22–29)
CREAT SERPL-MCNC: 0.93 MG/DL (ref 0.57–1)
DEPRECATED RDW RBC AUTO: 38.8 FL (ref 37–54)
EGFRCR SERPLBLD CKD-EPI 2021: 86 ML/MIN/1.73
EOSINOPHIL # BLD AUTO: 0.24 10*3/MM3 (ref 0–0.4)
EOSINOPHIL NFR BLD AUTO: 3 % (ref 0.3–6.2)
ERYTHROCYTE [DISTWIDTH] IN BLOOD BY AUTOMATED COUNT: 12.5 % (ref 12.3–15.4)
FOLATE SERPL-MCNC: 8.3 NG/ML (ref 4.78–24.2)
GLOBULIN UR ELPH-MCNC: 4.2 GM/DL
GLUCOSE SERPL-MCNC: 80 MG/DL (ref 65–99)
HBA1C MFR BLD: 5.4 % (ref 4.8–5.6)
HCT VFR BLD AUTO: 43.3 % (ref 34–46.6)
HCV AB SER QL: NORMAL
HDLC SERPL-MCNC: 53 MG/DL (ref 40–60)
HGB BLD-MCNC: 14.4 G/DL (ref 12–15.9)
IMM GRANULOCYTES # BLD AUTO: 0.03 10*3/MM3 (ref 0–0.05)
IMM GRANULOCYTES NFR BLD AUTO: 0.4 % (ref 0–0.5)
LDLC SERPL CALC-MCNC: 95 MG/DL (ref 0–100)
LDLC/HDLC SERPL: 1.78 {RATIO}
LYMPHOCYTES # BLD AUTO: 1.86 10*3/MM3 (ref 0.7–3.1)
LYMPHOCYTES NFR BLD AUTO: 23.3 % (ref 19.6–45.3)
MCH RBC QN AUTO: 28.7 PG (ref 26.6–33)
MCHC RBC AUTO-ENTMCNC: 33.3 G/DL (ref 31.5–35.7)
MCV RBC AUTO: 86.4 FL (ref 79–97)
MONOCYTES # BLD AUTO: 0.63 10*3/MM3 (ref 0.1–0.9)
MONOCYTES NFR BLD AUTO: 7.9 % (ref 5–12)
NEUTROPHILS NFR BLD AUTO: 5.16 10*3/MM3 (ref 1.7–7)
NEUTROPHILS NFR BLD AUTO: 64.6 % (ref 42.7–76)
NRBC BLD AUTO-RTO: 0 /100 WBC (ref 0–0.2)
PLATELET # BLD AUTO: 354 10*3/MM3 (ref 140–450)
PMV BLD AUTO: 10.2 FL (ref 6–12)
POTASSIUM SERPL-SCNC: 3.8 MMOL/L (ref 3.5–5.2)
PROT SERPL-MCNC: 7.7 G/DL (ref 6–8.5)
RBC # BLD AUTO: 5.01 10*6/MM3 (ref 3.77–5.28)
SODIUM SERPL-SCNC: 141 MMOL/L (ref 136–145)
T4 FREE SERPL-MCNC: 1.23 NG/DL (ref 0.92–1.68)
TRIGL SERPL-MCNC: 89 MG/DL (ref 0–150)
TSH SERPL DL<=0.05 MIU/L-ACNC: 1.05 UIU/ML (ref 0.27–4.2)
VIT B12 BLD-MCNC: 637 PG/ML (ref 211–946)
VLDLC SERPL-MCNC: 17 MG/DL (ref 5–40)
WBC NRBC COR # BLD AUTO: 7.98 10*3/MM3 (ref 3.4–10.8)

## 2024-12-06 PROCEDURE — 84443 ASSAY THYROID STIM HORMONE: CPT | Performed by: NURSE PRACTITIONER

## 2024-12-06 PROCEDURE — 82607 VITAMIN B-12: CPT | Performed by: NURSE PRACTITIONER

## 2024-12-06 PROCEDURE — 82306 VITAMIN D 25 HYDROXY: CPT | Performed by: NURSE PRACTITIONER

## 2024-12-06 PROCEDURE — 84439 ASSAY OF FREE THYROXINE: CPT | Performed by: NURSE PRACTITIONER

## 2024-12-06 PROCEDURE — 80061 LIPID PANEL: CPT | Performed by: NURSE PRACTITIONER

## 2024-12-06 PROCEDURE — 86803 HEPATITIS C AB TEST: CPT | Performed by: NURSE PRACTITIONER

## 2024-12-06 PROCEDURE — 83036 HEMOGLOBIN GLYCOSYLATED A1C: CPT | Performed by: NURSE PRACTITIONER

## 2024-12-06 PROCEDURE — 85025 COMPLETE CBC W/AUTO DIFF WBC: CPT | Performed by: NURSE PRACTITIONER

## 2024-12-06 PROCEDURE — 80053 COMPREHEN METABOLIC PANEL: CPT | Performed by: NURSE PRACTITIONER

## 2024-12-06 PROCEDURE — 82746 ASSAY OF FOLIC ACID SERUM: CPT | Performed by: NURSE PRACTITIONER

## 2024-12-06 RX ORDER — FAMOTIDINE 20 MG/1
20 TABLET, FILM COATED ORAL 2 TIMES DAILY PRN
Qty: 60 TABLET | Refills: 5 | Status: SHIPPED | OUTPATIENT
Start: 2024-12-06

## 2024-12-06 RX ORDER — MONTELUKAST SODIUM 10 MG/1
10 TABLET ORAL NIGHTLY
Qty: 30 TABLET | Refills: 5 | Status: SHIPPED | OUTPATIENT
Start: 2024-12-06

## 2024-12-06 RX ORDER — ONDANSETRON 4 MG/1
4 TABLET, ORALLY DISINTEGRATING ORAL EVERY 8 HOURS PRN
Qty: 20 TABLET | Refills: 0 | Status: SHIPPED | OUTPATIENT
Start: 2024-12-06

## 2024-12-06 RX ORDER — LEVALBUTEROL INHALATION SOLUTION 1.25 MG/3ML
1 SOLUTION RESPIRATORY (INHALATION) EVERY 4 HOURS PRN
Qty: 90 ML | Refills: 5 | Status: SHIPPED | OUTPATIENT
Start: 2024-12-06

## 2024-12-06 RX ORDER — FLUTICASONE PROPIONATE AND SALMETEROL 250; 50 UG/1; UG/1
1 POWDER RESPIRATORY (INHALATION)
Qty: 60 EACH | Refills: 5 | Status: SHIPPED | OUTPATIENT
Start: 2024-12-06

## 2024-12-06 NOTE — ASSESSMENT & PLAN NOTE
I will check her B12 and folate level today.  She is not currently taking folic acid.  Orders:    Vitamin B12 & Folate

## 2024-12-06 NOTE — ASSESSMENT & PLAN NOTE
Patient's (Body mass index is 48.47 kg/m².) indicates that they are morbidly/severely obese (BMI > 40 or > 35 with obesity - related health condition) with health conditions that include hypertension and GERD . Weight is unchanged. BMI  is above average; BMI management plan is completed. We discussed portion control, increasing exercise, Information on healthy weight added to patient's after visit summary, and information on bariatric surgery provided, see AVS.  I advised patient if she decides she wants to do bariatric surgery I will put the referral in for her.

## 2024-12-06 NOTE — ASSESSMENT & PLAN NOTE
She is currently stable on Advair inhaler twice daily and Singulair 10 mg daily as prescribed.  She uses Xopenex as needed.      Orders:    Fluticasone-Salmeterol (Advair Diskus) 250-50 MCG/ACT DISKUS; Inhale 1 puff 2 (Two) Times a Day. For asthma. Rinse mouth after use  Indications: Asthma    levalbuterol (Xopenex) 1.25 MG/3ML nebulizer solution; Take 1 ampule by nebulization Every 4 (Four) Hours As Needed for Wheezing or Shortness of Air. Indications: Asthma    montelukast (Singulair) 10 MG tablet; Take 1 tablet by mouth Every Night. Indications: Asthma

## 2024-12-06 NOTE — PROGRESS NOTES
Chief Complaint  Annual Exam, Plantar Fasciitis (Needs updated referral to podiatry), Obesity, Prediabetes, and Asthma    Subjective            Zhane Traylor is a 29 y.o. female who presents to [facility]   History of Present Illness  Annual physical.  She is overdue for follow-up, has not been seen in over a year.    She went to [facility] ER on 11/21/2024 due to anxiety.  She did have some lab workup done in the ER.  WBC count was slightly elevated but otherwise labs were unremarkable.    Discussed and reviewed   Alcohol Misuse Screening and Counseling, denies drinking.   Cardiovascular Disease Screening Tests, fasting lipid panel done on 4/25/2023 with  and HDL 39, otherwise normal.  She is due for fasting lipid panel.  Colorectal Cancer Screening, no family history of colon cancer counseling to Prevent Tobacco Use Zhane Traylor  reports that she quit smoking about 8 years ago. Her smoking use included cigarettes. She started smoking about 14 years ago. She has a 3 pack-year smoking history. She has been exposed to tobacco smoke. She has never used smokeless tobacco.   Diabetes Screening-she has prediabetes but has not been checked in over a year, will check A1c today with her labs.  Glaucoma screening, recommend routine vision exams.  Recommend routine biannual dental exams.  Hepatitis C Virus Screening, never done.   Human Immunodeficiency Virus (HIV) Screening, declines.   Screening for Sexually Transmitted Infections (STIs), declines.   Screening Mammography, family history of breast cancer with paternal aunt, paternal grandmother and great-grandmother. She states that her aunt who was diagnosed with breast cancer was diagnosed in her 30s.  Screening Pelvic Examinations/Pap tests (Includes a clinical breast examination), she states she is overdue for her Pap smear and needs a referral.  She states her last menstrual period was on 11/16/2024.    [facility] IMMUNIZATIONS:  Influenza and Prevnar 20 (Pneumococcal 20-valent conjugate)  Body mass index is 48.47 kg/m².  Obesity/prediabetes: Her last A1c was normal at 5.6% but has not been checked in over a year.  She has gained weight.  She states her mom was wanting her to look into doing bariatric surgery.  She is unsure of this.    She has had vitamin D deficiency and folic acid deficiency but states she is no longer taking the medicine because she ran out of refills.    Asthma: She has Xopenex inhaler and nebulizer to use as needed for asthma.  She also is on Singulair 10 mg every evening and Advair inhaler for prevention.  She states that previously albuterol has caused her heart to race and she has been on Xopenex since she was a child.    Hypertension: She does follow with cardiology and is currently on diltiazem  mg daily and losartan 50 mg daily.    She has vitamin D deficiency and folate deficiency.  She is no longer taking the medications, states she ran out of medications.    She has depression, anxiety and ADHD and follows with psychiatry Sammie Butterfield at [facility].  She states that she wants me to send her the lab results to her.    She states she has chronic nausea.  She also has GERD and was on famotidine twice daily but has been out of medicine.  She was given Zofran by her Ortho when she had knee surgery and was wanting a refill.  She is asking about Phenergan.    PHQ-2 Depression Screening  Little interest or pleasure in doing things? Not at all   Feeling down, depressed, or hopeless? Not at all   PHQ-2 Total Score 0       Tobacco Use: Medium Risk (12/6/2024)    Patient History     Smoking Tobacco Use: Former     Smokeless Tobacco Use: Never     Passive Exposure: Past      E-cigarette/Vaping    E-cigarette/Vaping Use Never User      E-cigarette/Vaping Substances    Nicotine No     THC No     CBD No     Flavoring No      E-cigarette/Vaping Devices    Disposable No     Pre-filled or Refillable  "Cartridge No     Refillable Tank No     Pre-filled Pod No        Alcohol Use: Not At Risk (6/25/2021)    AUDIT-C     Frequency of Alcohol Consumption: Never     Average Number of Drinks: Not on file     Frequency of Binge Drinking: Not on file         Objective   Vital Signs:   Vitals:    12/06/24 0914   BP: 123/81   Pulse: 91   Temp: 96.4 °F (35.8 °C)   SpO2: 100%   Weight: (!) 136 kg (300 lb 4.8 oz)   Height: 167.6 cm (66\")   PainSc: 0-No pain     Body mass index is 48.47 kg/m².    Wt Readings from Last 3 Encounters:   12/06/24 (!) 136 kg (300 lb 4.8 oz)   11/20/24 (!) 136 kg (300 lb 7.8 oz)   10/24/24 136 kg (300 lb)     BP Readings from Last 3 Encounters:   12/06/24 123/81   11/21/24 147/78   10/24/24 138/65       Health Maintenance   Topic Date Due    HEPATITIS C SCREENING  Never done    ANNUAL PHYSICAL  Never done    LIPID PANEL  04/25/2024    COVID-19 Vaccine (4 - 2024-25 season) 09/01/2024    BMI FOLLOWUP  12/06/2025    PAP SMEAR  01/27/2026    TDAP/TD VACCINES (3 - Td or Tdap) 10/07/2032    Pneumococcal Vaccine 0-64  Completed    INFLUENZA VACCINE  Completed    CHLAMYDIA SCREENING  Discontinued       /81   Pulse 91   Temp 96.4 °F (35.8 °C)   Ht 167.6 cm (66\")   Wt (!) 136 kg (300 lb 4.8 oz)   SpO2 100%   BMI 48.47 kg/m²       Current Outpatient Medications:     amphetamine-dextroamphetamine (Adderall) 10 MG tablet, Take 1 tablet by mouth 2 (Two) Times a Day., Disp: 60 tablet, Rfl: 0    dilTIAZem XR (DILACOR XR) 240 MG 24 hr capsule, Take 1 capsule by mouth Daily., Disp: 90 capsule, Rfl: 3    famotidine (Pepcid) 20 MG tablet, Take 1 tablet by mouth 2 (Two) Times a Day As Needed for Indigestion. Indications: Heartburn, Disp: 60 tablet, Rfl: 5    Fluticasone-Salmeterol (Advair Diskus) 250-50 MCG/ACT DISKUS, Inhale 1 puff 2 (Two) Times a Day. For asthma. Rinse mouth after use  Indications: Asthma, Disp: 60 each, Rfl: 5    ibuprofen (ADVIL,MOTRIN) 800 MG tablet, Take 1 tablet by mouth 3 (Three) Times " a Day., Disp: 90 tablet, Rfl: 0    lamoTRIgine (LaMICtal) 100 MG tablet, Take 1 tablet by mouth 2 (Two) Times a Day. (Patient taking differently: Take 1 tablet by mouth 2 (Two) Times a Day. Only takes at night), Disp: 60 tablet, Rfl: 3    levalbuterol (Xopenex) 1.25 MG/3ML nebulizer solution, Take 1 ampule by nebulization Every 4 (Four) Hours As Needed for Wheezing or Shortness of Air. Indications: Asthma, Disp: 90 mL, Rfl: 5    LORazepam (ATIVAN) 1 MG tablet, Take 1 tablet by mouth 3 (Three) Times a Day As Needed for anxiety or sleep, Disp: 90 tablet, Rfl: 0    losartan (Cozaar) 50 MG tablet, Take 1 tablet by mouth Daily., Disp: 90 tablet, Rfl: 3    lurasidone (Latuda) 40 MG tablet tablet, Take 1 tablet by mouth every night at bedtime., Disp: 30 tablet, Rfl: 3    montelukast (Singulair) 10 MG tablet, Take 1 tablet by mouth Every Night. Indications: Asthma, Disp: 30 tablet, Rfl: 5    ondansetron ODT (ZOFRAN-ODT) 4 MG disintegrating tablet, Place 1 tablet on the tongue Every 8 (Eight) Hours As Needed for Nausea or Vomiting., Disp: 20 tablet, Rfl: 0   Past Medical History:   Diagnosis Date    Acid reflux     Alcoholism in remission 02/05/2021    Anxiety disorder 01/05/2021    Asthma     uses inhaler    Constipation     Encounter for blood transfusion     H/O psychiatric care     Hypertension     Left anterior cruciate ligament tear     Major depression 02/05/2021    Obesity     Onychomycosis     PONV (postoperative nausea and vomiting)     Prediabetes     Tachycardia     follows with Nadeen        Physical Exam  Vitals reviewed.   Constitutional:       Appearance: Normal appearance. She is well-developed. She is morbidly obese.   HENT:      Right Ear: Tympanic membrane, ear canal and external ear normal.      Left Ear: Tympanic membrane, ear canal and external ear normal.      Mouth/Throat:      Mouth: Mucous membranes are moist.      Pharynx: No pharyngeal swelling, oropharyngeal exudate or posterior oropharyngeal  erythema.   Neck:      Thyroid: No thyroid mass, thyromegaly or thyroid tenderness.   Cardiovascular:      Rate and Rhythm: Normal rate and regular rhythm.      Heart sounds: No murmur heard.     No friction rub. No gallop.   Pulmonary:      Effort: Pulmonary effort is normal.      Breath sounds: Normal breath sounds. No wheezing or rhonchi.   Lymphadenopathy:      Cervical: No cervical adenopathy.   Skin:     General: Skin is warm and dry.   Neurological:      Mental Status: She is alert and oriented to person, place, and time.      Cranial Nerves: No cranial nerve deficit.   Psychiatric:         Mood and Affect: Mood and affect normal.         Behavior: Behavior normal.         Thought Content: Thought content normal. Thought content does not include homicidal or suicidal ideation.         Judgment: Judgment normal.          Result Review :    The following data was reviewed by: CLEO Garza on 12/06/2024:  Common Labs   Common labs          11/20/2024    21:59   Common Labs   Glucose 97    BUN 17    Creatinine 1.02    Sodium 136    Potassium 3.5    Chloride 100    Calcium 9.8    Albumin 4.2    Total Bilirubin 0.8    Alkaline Phosphatase 94    AST (SGOT) 27    ALT (SGPT) 32    WBC 10.82    Hemoglobin 14.0    Hematocrit 44.4    Platelets 315         XR Chest 1 View    Result Date: 11/20/2024  Impression: No radiographic evidence of acute cardiopulmonary process. Electronically Signed: Ubaldo Israel MD  11/20/2024 10:26 PM EST  Workstation ID: [id number]    Assessment & Plan  Annual physical exam  Health maintenance and prevention discussed, handouts provided, see AVS.  Orders:    CBC Auto Differential    TSH+Free T4    Lipid Panel    Comprehensive Metabolic Panel    Ambulatory Referral to Obstetrics / Gynecology    Nausea and vomiting, unspecified vomiting type  I will have her continue Pepcid twice daily and refill Zofran to take as needed.  I did not recommend for her to be on Phenergan.  Orders:     famotidine (Pepcid) 20 MG tablet; Take 1 tablet by mouth 2 (Two) Times a Day As Needed for Indigestion. Indications: Heartburn    ondansetron ODT (ZOFRAN-ODT) 4 MG disintegrating tablet; Place 1 tablet on the tongue Every 8 (Eight) Hours As Needed for Nausea or Vomiting.    Moderate persistent asthma without complication  She is currently stable on Advair inhaler twice daily and Singulair 10 mg daily as prescribed.  She uses Xopenex as needed.      Orders:    Fluticasone-Salmeterol (Advair Diskus) 250-50 MCG/ACT DISKUS; Inhale 1 puff 2 (Two) Times a Day. For asthma. Rinse mouth after use  Indications: Asthma    levalbuterol (Xopenex) 1.25 MG/3ML nebulizer solution; Take 1 ampule by nebulization Every 4 (Four) Hours As Needed for Wheezing or Shortness of Air. Indications: Asthma    montelukast (Singulair) 10 MG tablet; Take 1 tablet by mouth Every Night. Indications: Asthma    Need for influenza vaccination  Flu shot given in office today.  Orders:    Fluzone >6mos ([id number])    Need for hepatitis C screening test  We will check hepatitis C screening with labs today.  Orders:    Hepatitis C Antibody    Vitamin B12 deficiency  She is no longer taking vitamin B12.  I will recheck her B12 level today.  Orders:    Vitamin B12 & Folate    Prediabetes  I will recheck her A1c today with her labs.  Orders:    Hemoglobin A1c    Primary hypertension  Hypertension is stable and controlled  Continue current treatment regimen.  Weight loss.  Blood pressure will be reassessed in 6 months.         Plantar fasciitis, bilateral  She needed updated referral for podiatry, referral placed.  Orders:    Ambulatory Referral to Podiatry    Folate deficiency  I will check her B12 and folate level today.  She is not currently taking folic acid.  Orders:    Vitamin B12 & Folate    Vitamin D deficiency  I will check her today.  She is not currently taking vitamin D.  Orders:    Vitamin D,25-Hydroxy    Encounter for screening mammogram for  malignant neoplasm of breast    Orders:    Mammo Screening Digital Tomosynthesis Bilateral With CAD; Future    Family history of breast cancer  Due to her family history of breast cancer, will order mammogram.  Orders:    Mammo Screening Digital Tomosynthesis Bilateral With CAD; Future    Need for pneumococcal 20-valent conjugate vaccination  We discussed that she is a high risk with her asthma so I will give her pneumococcal 20 today.  Orders:    Pneumococcal Conjugate Vaccine 20-Valent (PCV20)    Class 3 severe obesity with serious comorbidity and body mass index (BMI) of 45.0 to 49.9 in adult, unspecified obesity type  Patient's (Body mass index is 48.47 kg/m².) indicates that they are morbidly/severely obese (BMI > 40 or > 35 with obesity - related health condition) with health conditions that include hypertension and GERD . Weight is unchanged. BMI  is above average; BMI management plan is completed. We discussed portion control, increasing exercise, Information on healthy weight added to patient's after visit summary, and information on bariatric surgery provided, see AVS .  I advised patient if she decides she wants to do bariatric surgery I will put the referral in for her.       Diagnosis Plan   1. Annual physical exam  CBC Auto Differential    TSH+Free T4    Lipid Panel    Comprehensive Metabolic Panel    Ambulatory Referral to Obstetrics / Gynecology      2. Nausea and vomiting, unspecified vomiting type  famotidine (Pepcid) 20 MG tablet    ondansetron ODT (ZOFRAN-ODT) 4 MG disintegrating tablet      3. Moderate persistent asthma without complication  Fluticasone-Salmeterol (Advair Diskus) 250-50 MCG/ACT DISKUS    levalbuterol (Xopenex) 1.25 MG/3ML nebulizer solution    montelukast (Singulair) 10 MG tablet      4. Need for influenza vaccination  Fluzone >6mos ([id number])      5. Need for hepatitis C screening test  Hepatitis C Antibody      6. Vitamin B12 deficiency  Vitamin B12 & Folate      7.  Prediabetes  Hemoglobin A1c      8. Primary hypertension        9. Plantar fasciitis, bilateral  Ambulatory Referral to Podiatry      10. Folate deficiency  Vitamin B12 & Folate      11. Vitamin D deficiency  Vitamin D,25-Hydroxy      12. Encounter for screening mammogram for malignant neoplasm of breast  Mammo Screening Digital Tomosynthesis Bilateral With CAD      13. Family history of breast cancer  Mammo Screening Digital Tomosynthesis Bilateral With CAD      14. Need for pneumococcal 20-valent conjugate vaccination  Pneumococcal Conjugate Vaccine 20-Valent (PCV20)      15. Class 3 severe obesity with serious comorbidity and body mass index (BMI) of 45.0 to 49.9 in adult, unspecified obesity type            I spent 48 minutes caring for Zhane on this date of service. This time includes time spent by me in the following activities:preparing for the visit, reviewing tests, performing a medically appropriate examination and/or evaluation , counseling and educating the patient/family/caregiver, ordering medications, tests, or procedures, referring and communicating with other health care professionals , and documenting information in the medical record.  FOLLOW UP  Return in about 6 months (around 6/6/2025) for 30 min apt for complex pt.  Patient was given instructions and counseling regarding her condition or for health maintenance advice. Please see specific information pulled into the AVS if appropriate.       CURRENT & DISCONTINUED MEDICATIONS  Current Outpatient Medications   Medication Instructions    amphetamine-dextroamphetamine (Adderall) 10 MG tablet 10 mg, Oral, 2 Times Daily    Dilt- mg, Oral, Daily    famotidine (PEPCID) 20 mg, Oral, 2 Times Daily PRN    Fluticasone-Salmeterol (Advair Diskus) 250-50 MCG/ACT DISKUS 1 puff, Inhalation, 2 Times Daily - RT, For asthma. Rinse mouth after use    ibuprofen (ADVIL,MOTRIN) 800 mg, Oral, 3 Times Daily    lamoTRIgine (LAMICTAL) 100 mg, Oral, 2 Times Daily     levalbuterol (XOPENEX) 1.25 mg, Nebulization, Every 4 Hours PRN    LORazepam (ATIVAN) 1 MG tablet Take 1 tablet by mouth 3 (Three) Times a Day As Needed for anxiety or sleep    losartan (COZAAR) 50 mg, Oral, Daily    lurasidone (LATUDA) 40 mg, Oral, Every Night at Bedtime    montelukast (SINGULAIR) 10 mg, Oral, Nightly    ondansetron ODT (ZOFRAN-ODT) 4 mg, Translingual, Every 8 Hours PRN       Medications Discontinued During This Encounter   Medication Reason    LORazepam ER (Loreev XR) 3 MG capsule extended-release 24 hour sprinkle Duplicate order    LORazepam (Ativan) 1 MG tablet Duplicate order    LORazepam (ATIVAN) 1 MG tablet Duplicate order    aspirin (Ecotrin) 325 MG EC tablet *Therapy completed    eszopiclone (Lunesta) 2 MG tablet Discontinued by another clinician    folic acid (FOLVITE) 1 MG tablet *Therapy completed    lamoTRIgine (LaMICtal) 100 MG tablet Duplicate order    lamoTRIgine (LaMICtal) 100 MG tablet Duplicate order    lamoTRIgine (LaMICtal) 100 MG tablet Duplicate order    levalbuterol (XOPENEX HFA) 45 MCG/ACT inhaler Duplicate order    LORazepam ER (Loreev XR) 3 MG capsule extended-release 24 hour sprinkle Duplicate order    lurasidone (Latuda) 40 MG tablet tablet Duplicate order    lurasidone (Latuda) 40 MG tablet tablet Duplicate order    methylPREDNISolone (MEDROL) 4 MG dose pack *Therapy completed    Multiple Vitamins-Minerals (MULTIVITAMIN ADULT, MINERALS, PO) *Therapy completed    oxyCODONE-acetaminophen (PERCOCET) 7.5-325 MG per tablet *Therapy completed    naloxone (NARCAN) 4 MG/0.1ML nasal spray Historical Med - Therapy completed    ramelteon (Rozerem) 8 MG tablet Discontinued by another clinician    ramelteon (Rozerem) 8 MG tablet Discontinued by another clinician    rOPINIRole (REQUIP) 2 MG tablet Discontinued by another clinician    vitamin D3 (Vitamin D) 125 MCG (5000 UT) capsule capsule *Therapy completed    vitamin B-12 (CYANOCOBALAMIN) 1000 MCG tablet *Therapy completed     levalbuterol (Xopenex) 1.25 MG/3ML nebulizer solution Reorder    famotidine (Pepcid) 20 MG tablet Reorder    Fluticasone-Salmeterol (Advair Diskus) 250-50 MCG/ACT DISKUS Reorder    montelukast (Singulair) 10 MG tablet Reorder    ondansetron ODT (ZOFRAN-ODT) 4 MG disintegrating tablet Reorder        Parts of this note are electronic transcriptions/translations of spoken language to printed text using the Dragon Dictation system.    Sonya Squires, CLEO  12/06/24  16:13 EST

## 2024-12-06 NOTE — ASSESSMENT & PLAN NOTE
I will have her continue Pepcid twice daily and refill Zofran to take as needed.  I did not recommend for her to be on Phenergan.  Orders:    famotidine (Pepcid) 20 MG tablet; Take 1 tablet by mouth 2 (Two) Times a Day As Needed for Indigestion. Indications: Heartburn    ondansetron ODT (ZOFRAN-ODT) 4 MG disintegrating tablet; Place 1 tablet on the tongue Every 8 (Eight) Hours As Needed for Nausea or Vomiting.

## 2024-12-06 NOTE — ASSESSMENT & PLAN NOTE
Hypertension is stable and controlled  Continue current treatment regimen.  Weight loss.  Blood pressure will be reassessed in 6 months.

## 2024-12-06 NOTE — ASSESSMENT & PLAN NOTE
I will check her today.  She is not currently taking vitamin D.  Orders:    Vitamin D,25-Hydroxy

## 2024-12-06 NOTE — ASSESSMENT & PLAN NOTE
She is no longer taking vitamin B12.  I will recheck her B12 level today.  Orders:    Vitamin B12 & Folate

## 2025-01-02 NOTE — PROGRESS NOTES
Chief Complaint   Patient presents with    Annual Exam       HPI:   29 y.o. . Presents for well woman exam. Contraception:  Abstinence  Menses:   q month, lasts 5-6 days, changes regular pad q 5 hrs on heaviest days.   Pain:  Mild, OTC meds control discomfort  Last pap: normal IGP,rfx Aptima HPV All Pth (2023 17:01)  Complaints: Pt has no complaints today.  [employer] Hereditary Cancer Screen (2023 17:09)     Past Medical History:   Diagnosis Date    Acid reflux     Alcoholism in remission 2021    Anxiety disorder 2021    Asthma     uses inhaler    Constipation     Encounter for blood transfusion     H/O psychiatric care     Hypertension     Left anterior cruciate ligament tear     Major depression 2021    Obesity     Onychomycosis     PONV (postoperative nausea and vomiting)     Prediabetes     Tachycardia     follows with Nadeen      Past Surgical History:   Procedure Laterality Date    DILATATION AND CURETTAGE      KNEE ACL RECONSTRUCTION Left 2022    Procedure: LEFT KNEE ATHROSCOPIC ANTERIOR CRUCIATE LIGAMENT RECONSTRUCTION  WITH  ALLOGRAFT;  Surgeon: Troy Hsu MD;  [place] [facility] [place] [facility] [place]  Service: Orthopedics;  Laterality: Left;    KNEE ACL RECONSTRUCTION Right 4/15/2024    Procedure: KNEE ANTERIOR CRUCIATE LIGAMENT RECONSTRUCTION WITH ALLOGRAFT LATERAL RETINACULAR RELEASE;  Surgeon: Troy Hsu MD;  Location: [facility] [place] [facility] [place];  Service: Orthopedics;  Laterality: Right;    WISDOM TOOTH EXTRACTION        Family History   Problem Relation Age of Onset    Atrial fibrillation Father     Heart failure Paternal Grandfather     Heart failure Paternal Grandmother     Breast cancer Paternal Grandmother 70    Heart failure Maternal Grandmother     Heart failure Maternal Grandfather     Breast cancer Paternal Aunt         x 3    Breast cancer Paternal Great-Grandmother     Ovarian cancer Neg Hx     Uterine cancer Neg Hx     Prostate cancer Neg Hx      "Colon cancer Neg Hx      Allergies as of 01/23/2025 - Reviewed 01/23/2025   Allergen Reaction Noted    Albuterol Palpitations 12/06/2024        PCP: does manage PMHx and preventative labs    /82   Pulse 106   Ht 167.6 cm (66\")   Wt 134 kg (296 lb)   LMP 01/10/2025 (Approximate)   BMI 47.78 kg/m²     PHYSICAL EXAM: Chaperone present   General- NAD, alert and oriented, appropriate  Psych- Normal mood, good memory  Neck- No masses, no thyroid enlargement  Lymphatic- No palpable neck, axillary, or groin nodes  CV- Mild tachycardia, no murmurs  Resp- CTA to bases, no wheezes  Abdomen- Soft, non distended, non tender, no masses  Breast left-  Bilaterally symmetrical, no masses, non tender, no nipple discharge  Breast right- Bilaterally symmetrical, no masses, non tender, no nipple discharge  External genitalia- Normal female, no lesions  Urethra/meatus- Normal, no masses, non tender, no prolapse  Bladder- Normal, no masses, non tender, no prolapse  Vagina- Normal, no atrophy, no lesions, no discharge, no prolapse  Cvx- Normal, no lesions, no discharge, No cervical motion tenderness  Uterus- Normal size, shape & consistency.  Non tender, mobile.  Adnexa- No mass, non tender  Anus/Rectum/Perineum- Not performed  Ext- No edema, no cyanosis    Skin- No lesions, no rashes, no acanthosis nigricans    ASSESSMENT and PLAN:    Diagnoses and all orders for this visit:    1. Well woman exam (Primary)    2. Tachycardia  Comments:  FU with your PCP for evaluation and management of elevated heart rate      Preventative:   BREAST HEALTH- Monthly self breast exam importance and how to reviewed. MMG and/or MRI (prn) reviewed per society guidelines and her individual history. Screening Imaging: Not medically needed  CERVICAL CANCER Screening- Reviewed current ASCCP guidelines for screening w and wo cotest HPV, age specific.  Screen: Already up to date  COLON CANCER Screening- Reviewed current medical society guidelines and " options.  Screen:  Not medically needed  SEXUAL HEALTH: Declines STD screening  BONE HEALTH- Reviewed current medical society guidelines and options for testing, calcium and vit D intake.  Weight bearing exercise.  DEXA: Not medically needed  VACCINATIONS Recommended: COVID and booster PRN, Flu annually  Importance discussed, risk being unvaccinated reviewed.  Questions answered  Genetic testing: Previously screened negative  Smoking status- NON SMOKER  Follow up PCP/Specialist PMHx and Labs  TRACK MENSES, RTO if <q21d, >7d long, heavy or painful.    All BIRTH CONTROL options R/B/A/SE/E of each reviewed in detail.  Declines contraceptive management, recommend PNV daily and healthy lifestyle if desires pregnancy    Follow Up:  Return in about 1 year (around 1/23/2026).        Rupal Roberts, APRN  01/23/2025

## 2025-01-06 NOTE — ASSESSMENT & PLAN NOTE
Due to her family history of breast cancer, will order mammogram.  Orders:    Mammo Screening Digital Tomosynthesis Bilateral With CAD; Future     Physical Therapy Progress Note    Visit Type: Progress Note  Visit: 16  Referring Provider: Sajan Guillen MD  Next referring provider visit: 1/20/2025  Medical Diagnosis (from order): Z96.641 - S/P total right hip arthroplasty     SUBJECTIVE                                                                                                               Patient reports he is having tightness still in his hamstring, but it is getting better.  Nerve pain has gotten much better - no longer numbness, occasional tingling. Sometimes having pain at left lower back which he notices with prolonged sitting and prolonged standing/walking. Feels at about 70-80% improvement in terms of right hip.   Functional Change: Able to reach down to feet more easily. Able to sit for longer periods of time without pain at right hip. Able to sleep without limitation. No longer using cane in home or for short distances out of home.   Current Functional Limitations: Low back pain with prolonged walking/standing. Limited in donning socks/shoes on right foot - needs assistive devices. Heavy lifting - very cautious such as with laundry, 5 gallon water jug.     Pain / Symptoms  - Pain rating (out of 10): Current: 3 (Left lumbar region, to left lateral hip, posterior thigh) ; Best: 3; Worst: 8      OBJECTIVE                                                                                                                    Posture:  Shifted to left lower extremity in standing    Observation   Shoe lift on left foot, using cane     Range of Motion (ROM)   (degrees unless noted; active unless noted; norms in ( ); negative=lacking to 0, positive=beyond 0)  WNL: LLE, RLE, except as noted  Hip:   - Flexion (100-120):       Right: 100     - ABDuction (40):       Right:  23    - Internal Rotation (45-50):     - in supine:         Right: 33   - External Rotation (45-50):     - in supine:         Right: 34    Strength  (out of 5 unless noted, standard test  position unless noted)   5/5: RLE, LLE, except as noted  Hip:    - Extension:         Right: 3+    - Abduction:         Right: 3-    - Internal Rotation:         Right: 4, pain    - External Rotation:         Right: 4, pain  Ankle:    - Dorsiflexion:         Right: 4          Muscle Length Tests  Right hip straight leg raise - 58 degrees        Ambulation / Gait  - Assistive device: single point cane  - Assist Level: modified independent  - Surface: even  - Description: decreased mercedes/pace, decreased step length left and leans left             Treatment     Therapeutic Exercise  Reassessment completed of objective measures, home exercise program progressions    Defer 1/6/2025:   NuStep x6', level 7   Alternating Bosu lunges   3 way hip on L/R LE x 20  Gastroc stretch at slant board  Prone knee bends,  hip extension x 15  Hip flexor stretch  Prone quad stretch   HS curls, bridging with swiss ball  Hooklying hip abduction with pilates ring    Manual Therapy   Defer -   Soft tissue mobilization/ myofascial release using foam roller Right hamstring, gluts, ITB to decrease soft tissue tightness    Neuromuscular Re-Education  Neuromuscular Re-education to improve quality of motion, improve posture and postural awareness, improve proprioception, improve balance, improve coordination  and improve kinesthetic sense:      Transversus abdominus activation with straight leg raise  Transversus abdominus bracing with pilates ring ipsilateral holds 5\" x10 each, contralateral   Transversus abdominus bracing with Bent knee fall outs x15 each     Skilled input: verbal instruction/cues, tactile instruction/cues and posture correction    Writer verbally educated and received verbal consent for hand placement, positioning of patient, and techniques to be performed today from patient for clothing adjustments for techniques, hand placement and palpation for techniques and therapist position for techniques as described above and how  they are pertinent to the patient's plan of care.  Home Exercise Program  Access Code: [email or web address]  URL: [email or web address]  Date: 01/06/2025  Prepared by: Asya Blair    Exercises  - Supine Heel Slide  - 1-2 x daily - 7 x weekly - 2 sets - 15 reps  - Seated Long Arc Quad  - 1-2 x daily - 7 x weekly - 3 sets - 10 reps - 5 seconds hold  - Supine Hip Abduction AROM  - 1-2 x daily - 7 x weekly - 3 sets - 10 reps  - Supine Bridge  - 1-2 x daily - 7 x weekly - 3 sets - 10 reps  - Seated Hamstring Stretch  - 1-2 x daily - 3 reps - 30 sec hold  - Supine Sciatic Nerve Glide  - 1-2 x daily - 7 x weekly - 1 sets - 10-20 reps  - Long Sitting Calf Stretch with Strap  - 1-2 x daily - 7 x weekly - 3 sets - 30\" hold  - Squat with Chair Touch  - 1 x daily - 7 x weekly - 2-3 sets - 10 reps  - Single Leg Stance with 3-Way Kick on Stair  - 1 x daily - 7 x weekly - 2 sets - 10 reps  - Prone Knee Flexion AROM  - 1 x daily - 7 x weekly - 2 sets - 10 reps  - Prone Hip Extension - One Pillow  - 1 x daily - 7 x weekly - 2 sets - 10 reps  - Bent Knee Fallouts with Alternating Legs  - 1 x daily - 7 x weekly - 2 sets - 10 reps  - Hooklying Straight Leg Raise  - 1 x daily - 7 x weekly - 1-2 sets - 10 reps  - Side Stepping with Resistance at Ankles and Counter Support  - 1 x daily - 7 x weekly - 3 sets - 10 reps      ASSESSMENT                                                                                                            Gains in skilled therapy to date as expected in right hip, although limited due to post-operative complications.  Patient attends therapy as recommended.  Patient reports performing HEP as prescribed.  Progress toward discharge/long term goals (see below for specific status updates): good progress  Medically necessary skilled therapy interventions continue to be required to allow the patient to maximize their functional abilities as noted above.  Education:   - Results of above outlined  education: Verbalizes understanding and Demonstrates understanding    PLAN                                                                                                                          Modify interventions, frequency and duration per below.  The following skilled interventions to be implemented to achieve goals listed below:  Activities of Daily Living/Self Care (64914)  Gait Training (21136)  Neuromuscular Re-Education (90047)  Therapeutic Exercise (00902)  Therapeutic Activity (88856)  Manual Therapy (33019)    Frequency / Duration  1 times per week tapering as patient progresses for 4 weeks for an estimated total of 4 visits  Requesting additional 4 visits.    Suggestions for next session as indicated: Progress per plan of care - continue therapy for right lower extremity strengthening, core stabilization, gait training, balance training.      Goals  Long Term Goals: to be met by end of plan of care  1. Patient will ambulate on even surfaces without compensatory techniques and without adaptive equipment, with patient reported manageable pain/symptoms of 3/10 without an assistive device, independently for 500 feet. Status: not met See objective for gait deviations.   2. Patient will ascend and descend one flight of stairs (12 steps or more) using reciprocal pattern, independently with rail(s) to complete home management and self care. Status: met   3. Patient will transfer to and from sitting / standing from a 16 inch height to use toilets and low furniture in home and community independently and without compensation with patient reported manageable/tolerable difficulty. Status: met   4. HOOS jr: Patient will score 67% or higher on the HOOS Jr. to indicate improvement with walking and moving around related to hip arthroplasty. (minimal clinically important difference: 18% of calculated score)  Status: met   5. Patient will be independent with progressed and modified home exercise program. Status:  partially met  Mostly compliant with home exercise program dependent on symptoms.   6. Patient will lift from waist height and to waist height 10 lbs and carry 50 feet without pain/symptoms and with patient reported manageable/tolerable difficulty for completion of household tasks such as laundry.  Status: NEW GOAL 1/6/2025       Therapy procedure time and total treatment time can be found documented on the Time Entry flowsheet

## 2025-01-23 ENCOUNTER — OFFICE VISIT (OUTPATIENT)
Dept: OBSTETRICS AND GYNECOLOGY | Facility: CLINIC | Age: 30
End: 2025-01-23
Payer: COMMERCIAL

## 2025-01-23 VITALS
DIASTOLIC BLOOD PRESSURE: 82 MMHG | HEIGHT: 66 IN | HEART RATE: 106 BPM | BODY MASS INDEX: 47.09 KG/M2 | SYSTOLIC BLOOD PRESSURE: 130 MMHG | WEIGHT: 293 LBS

## 2025-01-23 DIAGNOSIS — Z01.419 WELL WOMAN EXAM: Primary | ICD-10-CM

## 2025-01-23 DIAGNOSIS — R00.0 TACHYCARDIA: ICD-10-CM

## 2025-03-03 ENCOUNTER — TELEMEDICINE (OUTPATIENT)
Dept: FAMILY MEDICINE CLINIC | Facility: CLINIC | Age: 30
End: 2025-03-03
Payer: COMMERCIAL

## 2025-03-03 DIAGNOSIS — E66.813 CLASS 3 SEVERE OBESITY WITH SERIOUS COMORBIDITY AND BODY MASS INDEX (BMI) OF 45.0 TO 49.9 IN ADULT, UNSPECIFIED OBESITY TYPE: Primary | ICD-10-CM

## 2025-03-03 DIAGNOSIS — E66.01 CLASS 3 SEVERE OBESITY WITH SERIOUS COMORBIDITY AND BODY MASS INDEX (BMI) OF 45.0 TO 49.9 IN ADULT, UNSPECIFIED OBESITY TYPE: Primary | ICD-10-CM

## 2025-03-03 PROCEDURE — 1126F AMNT PAIN NOTED NONE PRSNT: CPT | Performed by: NURSE PRACTITIONER

## 2025-03-03 PROCEDURE — 1160F RVW MEDS BY RX/DR IN RCRD: CPT | Performed by: NURSE PRACTITIONER

## 2025-03-03 PROCEDURE — 99213 OFFICE O/P EST LOW 20 MIN: CPT | Performed by: NURSE PRACTITIONER

## 2025-03-03 PROCEDURE — 1159F MED LIST DOCD IN RCRD: CPT | Performed by: NURSE PRACTITIONER

## 2025-03-03 NOTE — PROGRESS NOTES
Chief Complaint  Obesity (Discuss wt loss surg)    Subjective         Zhane Traylor presents to [facility]  History of Present Illness  She is wanting to discuss weight loss surgery.  She is wanting a referral to bariatric surgeon.    Her last recorded BMI was 47.13.    She is working on following a low mame diet and choose healthier options. She is not getting exercise due to her job demands and traveling too much.     Tobacco Use: Medium Risk (3/3/2025)    Patient History     Smoking Tobacco Use: Former     Smokeless Tobacco Use: Never     Passive Exposure: Past      Objective   Vital Signs:   There were no vitals taken for this visit.      Current Outpatient Medications:     atomoxetine (Strattera) 40 MG capsule, Take 1 capsule by mouth Daily., Disp: 30 capsule, Rfl: 1    dilTIAZem XR (DILACOR XR) 240 MG 24 hr capsule, Take 1 capsule by mouth Daily., Disp: 90 capsule, Rfl: 3    famotidine (Pepcid) 20 MG tablet, Take 1 tablet by mouth 2 (Two) Times a Day As Needed for Indigestion. Indications: Heartburn, Disp: 60 tablet, Rfl: 5    Fluticasone-Salmeterol (Advair Diskus) 250-50 MCG/ACT DISKUS, Inhale 1 puff 2 (Two) Times a Day. For asthma. Rinse mouth after use  Indications: Asthma, Disp: 60 each, Rfl: 5    imiquimod (ALDARA) 5 % cream, Use 0.5/half packet on the skin daily, Disp: 24 each, Rfl: 0    lamoTRIgine (LaMICtal) 100 MG tablet, Take 1 tablet by mouth 2 (Two) Times a Day. (Patient taking differently: Take 1 tablet by mouth 2 (Two) Times a Day. Only takes at night), Disp: 60 tablet, Rfl: 3    levalbuterol (Xopenex) 1.25 MG/3ML nebulizer solution, Take 1 ampule by nebulization Every 4 (Four) Hours As Needed for Wheezing or Shortness of Air. Indications: Asthma, Disp: 90 mL, Rfl: 5    LORazepam (ATIVAN) 1 MG tablet, Take 1 tablet by mouth 3 (Three) Times a Day As Needed for anxiety or sleep, Disp: 90 tablet, Rfl: 0    losartan (Cozaar) 50 MG tablet, Take 1 tablet by mouth  Daily., Disp: 90 tablet, Rfl: 3    lurasidone (Latuda) 40 MG tablet tablet, Take 1 tablet by mouth every night at bedtime., Disp: 30 tablet, Rfl: 3    montelukast (Singulair) 10 MG tablet, Take 1 tablet by mouth Every Night. Indications: Asthma, Disp: 30 tablet, Rfl: 5    ondansetron ODT (ZOFRAN-ODT) 4 MG disintegrating tablet, Place 1 tablet on the tongue Every 8 (Eight) Hours As Needed for Nausea or Vomiting., Disp: 20 tablet, Rfl: 0    promethazine-dextromethorphan (PROMETHAZINE-DM) 6.25-15 MG/5ML syrup, Take 5 mL by mouth 4 (Four) Times a Day As Needed for Cough., Disp: 120 mL, Rfl: 0    ramelteon (Rozerem) 8 MG tablet, Take 1 tablet by mouth Every Night., Disp: 90 tablet, Rfl: 1    vitamin D3 (Vitamin D) 125 MCG (5000 UT) capsule capsule, Take 1 capsule by mouth Daily. Indications: Vitamin D Deficiency, Disp: 90 capsule, Rfl: 3    atomoxetine (Strattera) 25 MG capsule, Take 1 capsule by mouth Daily., Disp: 30 capsule, Rfl: 0    ibuprofen (ADVIL,MOTRIN) 800 MG tablet, Take 1 tablet by mouth 3 (Three) Times a Day. (Patient not taking: Reported on 3/3/2025), Disp: 90 tablet, Rfl: 0    lamoTRIgine (LaMICtal) 100 MG tablet, Take 1 tablet by mouth 2 (Two) Times a Day., Disp: 180 tablet, Rfl: 1    LORazepam (ATIVAN) 1 MG tablet, Take 1 tablet by mouth 3 times a day as needed  anxiety or sleep, Disp: 90 tablet, Rfl: 0    LORazepam (ATIVAN) 1 MG tablet, Take 1 tablet by mouth 3 (Three) Times a Day As Needed for anxiety or sleep, Disp: 90 tablet, Rfl: 0    lurasidone (Latuda) 40 MG tablet tablet, Take 1 tablet by mouth every night at bedtime., Disp: 90 tablet, Rfl: 1  Physical Exam   Constitutional: She appears well-developed and well-nourished. She appears morbidly obese.  Pulmonary/Chest: Effort normal.   Neurological: She is alert.   Psychiatric: She has a normal mood and affect.     Result Review :                 Assessment and Plan    Diagnoses and all orders for this visit:    1. Class 3 severe obesity with  serious comorbidity and body mass index (BMI) of 45.0 to 49.9 in adult, unspecified obesity type (Primary)  Assessment & Plan:  Patient's (There is no height or weight on file to calculate BMI.) indicates that they are morbidly/severely obese (BMI > 40 or > 35 with obesity - related health condition) with health conditions that include hypertension . Weight is unchanged. BMI  is above average; BMI management plan is completed. We discussed low calorie, low carb based diet program, portion control, increasing exercise, consulting a Bariatric surgeon, and Information on healthy weight added to patient's after visit summary.     Orders:  -     Ambulatory Referral to Bariatric Surgery        Follow Up   Return in about 3 months (around 6/3/2025) for Next scheduled follow up.  Patient was given instructions and counseling regarding her condition or for health maintenance advice. Please see specific information pulled into the AVS if appropriate.     Mode of Visit: Video  Location of patient: other: in her car, parked, during her lunch break at work.   [place] [facility]  You have chosen to receive care through a telehealth visit.  Does the patient consent to use a video/audio connection for your medical care today? Yes  The visit included audio and video interaction. No technical issues occurred during this visit.   Patient understanding that this is a telehealth visit.  I cannot perform a full physical exam, therefore something might be missed, or patient may need to come into the office for further evaluation.  Patient is understanding and consents to this type of visit.    Sonya Squires, CLEO  03/03/2025

## 2025-03-03 NOTE — ASSESSMENT & PLAN NOTE
Patient's (There is no height or weight on file to calculate BMI.) indicates that they are morbidly/severely obese (BMI > 40 or > 35 with obesity - related health condition) with health conditions that include hypertension . Weight is unchanged. BMI  is above average; BMI management plan is completed. We discussed low calorie, low carb based diet program, portion control, increasing exercise, consulting a Bariatric surgeon, and Information on healthy weight added to patient's after visit summary.

## 2025-03-04 ENCOUNTER — TELEPHONE (OUTPATIENT)
Dept: BARIATRICS/WEIGHT MGMT | Facility: CLINIC | Age: 30
End: 2025-03-04
Payer: COMMERCIAL

## 2025-04-24 DIAGNOSIS — R11.2 NAUSEA AND VOMITING, UNSPECIFIED VOMITING TYPE: ICD-10-CM

## 2025-04-24 RX ORDER — ONDANSETRON 4 MG/1
4 TABLET, ORALLY DISINTEGRATING ORAL EVERY 8 HOURS PRN
Qty: 20 TABLET | Refills: 0 | Status: SHIPPED | OUTPATIENT
Start: 2025-04-24

## 2025-04-24 RX ORDER — FAMOTIDINE 20 MG/1
20 TABLET, FILM COATED ORAL 2 TIMES DAILY PRN
Qty: 60 TABLET | Refills: 5 | Status: SHIPPED | OUTPATIENT
Start: 2025-04-24

## 2025-05-12 ENCOUNTER — TELEPHONE (OUTPATIENT)
Dept: FAMILY MEDICINE CLINIC | Facility: CLINIC | Age: 30
End: 2025-05-12
Payer: COMMERCIAL

## 2025-05-23 ENCOUNTER — HOSPITAL ENCOUNTER (OUTPATIENT)
Dept: GENERAL RADIOLOGY | Facility: HOSPITAL | Age: 30
Discharge: HOME | End: 2025-05-23
Payer: COMMERCIAL

## 2025-05-23 ENCOUNTER — TRANSCRIBE ORDERS (OUTPATIENT)
Dept: ADMINISTRATIVE | Facility: HOSPITAL | Age: 30
End: 2025-05-23
Payer: COMMERCIAL

## 2025-05-23 DIAGNOSIS — R05.1 ACUTE COUGH: Primary | ICD-10-CM

## 2025-05-23 DIAGNOSIS — R05.1 ACUTE COUGH: ICD-10-CM

## 2025-05-23 PROCEDURE — 71046 X-RAY EXAM CHEST 2 VIEWS: CPT

## 2025-06-10 ENCOUNTER — TELEPHONE (OUTPATIENT)
Dept: ORTHOPEDIC SURGERY | Facility: CLINIC | Age: 30
End: 2025-06-10
Payer: COMMERCIAL

## 2025-06-10 NOTE — TELEPHONE ENCOUNTER
Caller: SUSHANT QUINONES    Relationship to patient: MOTHER    Best call back number: [phone]    Type of visit: NEEDS NEW PROBLEM APPT- SLIPPED AND FELL IN WATER 6/7/25- RIGHT KNEE PAIN- NO IMAGING- FIRST AVAILABLE IS 6/20/25- WOULD LIKE TO SEE IF SHE COULD BE WORKED IN SOONER- PLEASE CALL       No complaints

## 2025-06-12 ENCOUNTER — OFFICE VISIT (OUTPATIENT)
Dept: ORTHOPEDIC SURGERY | Facility: CLINIC | Age: 30
End: 2025-06-12
Payer: COMMERCIAL

## 2025-06-12 VITALS — HEART RATE: 117 BPM | WEIGHT: 292 LBS | OXYGEN SATURATION: 98 % | BODY MASS INDEX: 46.93 KG/M2 | HEIGHT: 66 IN

## 2025-06-12 DIAGNOSIS — M25.561 RIGHT KNEE PAIN, UNSPECIFIED CHRONICITY: Primary | ICD-10-CM

## 2025-06-12 DIAGNOSIS — E66.01 OBESITY, MORBID, BMI 40.0-49.9: ICD-10-CM

## 2025-06-12 DIAGNOSIS — S89.91XA INJURY OF RIGHT KNEE, INITIAL ENCOUNTER: ICD-10-CM

## 2025-06-12 NOTE — PROGRESS NOTES
"Chief Complaint  Follow-up and Pain of the Right Knee       Subjective      Zhane Traylor presents to [facility] for an evaluation  of her right knee. She was walking into the house after swimming when she slipped and had increase in pain to her right knee which happened on Saturday. She has pain with certain range of motions. She presents today in a normal knee brace. She had prior ACL reconstruction done on 04/15/24.     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     Review of Systems     Constitutional: Denies fevers, chills, weight loss  Cardiovascular: Denies chest pain, shortness of breath  Skin: Denies rashes, acute skin changes  Neurologic: Denies headache, loss of consciousness  MSK: right knee pain       Vital Signs:   Pulse 117   Ht 167.6 cm (66\")   Wt 132 kg (292 lb)   SpO2 98%   BMI 47.13 kg/m²            Ortho Exam    Physical Exam  General:Alert. No acute distress   Right lower extremity: -5 degrees extension, flexion  to 100 degrees, well healed surgicial incision, mild laxity to anterior  drawer, tender " to the medial joint line , one plus laxity to valgus test, mild pain with Dinesh's, distal neurovascularly intact, positive  pulses, positive EHL, FHL, GS, and TA. Sensation intact to all 5 nerves of the foot.     Procedures    X-Ray Report:  Right knee X-Ray  Indication: Evaluation of right knee pain   AP/Lateral and Standing view(s)  Findings:  Mild degenerative changes with mild medial  joint space narrowing, post-operative changes to the ACL   Prior studies available for comparison: yes       Imaging Results (Most Recent)       Procedure Component Value Units Date/Time    XR Knee 3 View Right - In process [[id number]] Resulted: 06/12/25 1624     Updated: 06/12/25 1628    This result has not been signed. Information might be incomplete.               Result Review :       XR Chest PA & Lateral  Result Date: 5/23/2025  Narrative: XR CHEST PA AND LATERAL Date of Exam: 5/23/2025 10:18 AM EDT Indication: ACUTE COUGH Comparison: 11/20/2024 FINDINGS: No consolidations or pleural effusions are observed. The cardiac silhouette is within normal limits for size. The mediastinum is unremarkable. No acute osseous abnormalities are seen.     Impression: 1.No evidence for acute cardiopulmonary process. Electronically Signed: Kelvin Ivey MD  5/23/2025 10:33 AM EDT  Workstation ID: [id number]             Assessment and Plan     Diagnoses and all orders for this visit:    1. Right knee pain, unspecified chronicity (Primary)  -     XR Knee 3 View Right    2. Obesity, morbid, BMI 40.0-49.9    3. Injury of right knee, initial encounter      The patient presents here today for a follow up for her right knee. X-rays were obtained in the office today and these were reviewed today.     STAT MRI order placed today on her right knee to evaluate for internal derangement and her ACL.     Playmaker brace given to the patient today in the office. She will continue current medications for pain control.     Educated on risk of elevated BMI.   Discussed options for weight loss/decreasing BMI prior to procedure including dietician consult, weight loss options and exercise program. and Call or return if worsening symptoms.    Follow Up     After MRI     Patient was given instructions and counseling regarding her condition or for health maintenance advice. Please see specific information pulled into the AVS if appropriate.     Scribed for Troy Hsu MD by Chelle Mccord.  06/12/25   16:37 EDT    I have personally performed the services described in this document as scribed by the above individual and it is both accurate and complete. Troy Hsu MD 06/13/25

## 2025-06-13 ENCOUNTER — TELEPHONE (OUTPATIENT)
Dept: ORTHOPEDIC SURGERY | Facility: CLINIC | Age: 30
End: 2025-06-13
Payer: COMMERCIAL

## 2025-06-13 ENCOUNTER — HOSPITAL ENCOUNTER (OUTPATIENT)
Dept: MRI IMAGING | Facility: HOSPITAL | Age: 30
Discharge: HOME | End: 2025-06-13
Payer: COMMERCIAL

## 2025-06-13 NOTE — TELEPHONE ENCOUNTER
Hub staff attempted to follow warm transfer process and was unsuccessful     Caller: SUSHANT MANSFIELD    Relationship to patient: Mother    Best call back number: [phone]    Patient is needing: PATIENT'S MOTHER CALLED BACK UNABLE TO WARM TRANSFER

## 2025-06-13 NOTE — TELEPHONE ENCOUNTER
Caller: Zhane Traylor    Relationship to patient: Self    Best call back number: [phone]    Chief complaint: RIGHT KNEE     Type of visit: STAT MRI     Requested date: SATURDAY (6-14-25) OR ANYTIME NEXT WEEK AFTER 4PM     If rescheduling, when is the original appointment: 6-13-25     Additional notes:WAS SCHEDULED TODAY AT [facility]. [facility] WILL NOT ALLOW PATIENT TO RESCHEDULE DUE TO IT BEING A STAT MRI OFFICE HAS TOO

## 2025-06-13 NOTE — TELEPHONE ENCOUNTER
Hub staff attempted to follow warm transfer process and was unsuccessful     Caller: Zhane Traylor    Relationship to patient: Self    Best call back number:     [phone]      Patient is needing: PATIENT CALLED AGAIN. ADVISED 48 HOUR TAT.         DELETE AFTER READING TO PATIENT: “I was unable to reach my scheduling contact. I will send a message to the scheduling team. Please allow 48 hours for the  staff to follow up on this request.”

## 2025-06-18 ENCOUNTER — HOSPITAL ENCOUNTER (EMERGENCY)
Facility: HOSPITAL | Age: 30
Discharge: NURSING, REHAB OR LONG-TERM CARE | End: 2025-06-19
Attending: EMERGENCY MEDICINE
Payer: COMMERCIAL

## 2025-06-18 DIAGNOSIS — F19.10 SUBSTANCE ABUSE: Primary | ICD-10-CM

## 2025-06-18 DIAGNOSIS — T50.901A ACCIDENTAL OVERDOSE, INITIAL ENCOUNTER: ICD-10-CM

## 2025-06-18 LAB
BASOPHILS # BLD AUTO: 0.06 10*3/MM3 (ref 0–0.2)
BASOPHILS NFR BLD AUTO: 0.7 % (ref 0–1.5)
DEPRECATED RDW RBC AUTO: 45.6 FL (ref 37–54)
EOSINOPHIL # BLD AUTO: 0.21 10*3/MM3 (ref 0–0.4)
EOSINOPHIL NFR BLD AUTO: 2.5 % (ref 0.3–6.2)
ERYTHROCYTE [DISTWIDTH] IN BLOOD BY AUTOMATED COUNT: 13.5 % (ref 12.3–15.4)
HCT VFR BLD AUTO: 39.6 % (ref 34–46.6)
HGB BLD-MCNC: 12.7 G/DL (ref 12–15.9)
IMM GRANULOCYTES # BLD AUTO: 0.04 10*3/MM3 (ref 0–0.05)
IMM GRANULOCYTES NFR BLD AUTO: 0.5 % (ref 0–0.5)
LYMPHOCYTES # BLD AUTO: 2.93 10*3/MM3 (ref 0.7–3.1)
LYMPHOCYTES NFR BLD AUTO: 35 % (ref 19.6–45.3)
MCH RBC QN AUTO: 29.3 PG (ref 26.6–33)
MCHC RBC AUTO-ENTMCNC: 32.1 G/DL (ref 31.5–35.7)
MCV RBC AUTO: 91.5 FL (ref 79–97)
MONOCYTES # BLD AUTO: 0.72 10*3/MM3 (ref 0.1–0.9)
MONOCYTES NFR BLD AUTO: 8.6 % (ref 5–12)
NEUTROPHILS NFR BLD AUTO: 4.41 10*3/MM3 (ref 1.7–7)
NEUTROPHILS NFR BLD AUTO: 52.7 % (ref 42.7–76)
NRBC BLD AUTO-RTO: 0 /100 WBC (ref 0–0.2)
PLATELET # BLD AUTO: 311 10*3/MM3 (ref 140–450)
PMV BLD AUTO: 9.2 FL (ref 6–12)
RBC # BLD AUTO: 4.33 10*6/MM3 (ref 3.77–5.28)
WBC NRBC COR # BLD AUTO: 8.37 10*3/MM3 (ref 3.4–10.8)

## 2025-06-18 PROCEDURE — 85025 COMPLETE CBC W/AUTO DIFF WBC: CPT

## 2025-06-18 PROCEDURE — 80143 DRUG ASSAY ACETAMINOPHEN: CPT | Performed by: EMERGENCY MEDICINE

## 2025-06-18 PROCEDURE — 84443 ASSAY THYROID STIM HORMONE: CPT | Performed by: EMERGENCY MEDICINE

## 2025-06-18 PROCEDURE — 80053 COMPREHEN METABOLIC PANEL: CPT | Performed by: EMERGENCY MEDICINE

## 2025-06-18 PROCEDURE — 93005 ELECTROCARDIOGRAM TRACING: CPT | Performed by: EMERGENCY MEDICINE

## 2025-06-18 PROCEDURE — 82077 ASSAY SPEC XCP UR&BREATH IA: CPT | Performed by: EMERGENCY MEDICINE

## 2025-06-18 PROCEDURE — 80179 DRUG ASSAY SALICYLATE: CPT | Performed by: EMERGENCY MEDICINE

## 2025-06-18 PROCEDURE — 99285 EMERGENCY DEPT VISIT HI MDM: CPT

## 2025-06-18 PROCEDURE — 84702 CHORIONIC GONADOTROPIN TEST: CPT | Performed by: EMERGENCY MEDICINE

## 2025-06-18 PROCEDURE — 83735 ASSAY OF MAGNESIUM: CPT | Performed by: EMERGENCY MEDICINE

## 2025-06-18 RX ORDER — SODIUM CHLORIDE 0.9 % (FLUSH) 0.9 %
10 SYRINGE (ML) INJECTION AS NEEDED
Status: DISCONTINUED | OUTPATIENT
Start: 2025-06-18 | End: 2025-06-19 | Stop reason: HOSPADM

## 2025-06-19 VITALS
BODY MASS INDEX: 48.82 KG/M2 | DIASTOLIC BLOOD PRESSURE: 92 MMHG | RESPIRATION RATE: 14 BRPM | SYSTOLIC BLOOD PRESSURE: 155 MMHG | HEIGHT: 65 IN | OXYGEN SATURATION: 100 % | WEIGHT: 293 LBS | TEMPERATURE: 97.9 F | HEART RATE: 99 BPM

## 2025-06-19 LAB
ALBUMIN SERPL-MCNC: 3.8 G/DL (ref 3.5–5.2)
ALBUMIN/GLOB SERPL: 1.2 G/DL
ALP SERPL-CCNC: 84 U/L (ref 39–117)
ALT SERPL W P-5'-P-CCNC: 85 U/L (ref 1–33)
AMPHET+METHAMPHET UR QL: NEGATIVE
AMPHETAMINES UR QL: NEGATIVE
ANION GAP SERPL CALCULATED.3IONS-SCNC: 14.7 MMOL/L (ref 5–15)
APAP SERPL-MCNC: <5 MCG/ML (ref 0–30)
AST SERPL-CCNC: 101 U/L (ref 1–32)
BARBITURATES UR QL SCN: NEGATIVE
BENZODIAZ UR QL SCN: POSITIVE
BILIRUB SERPL-MCNC: 0.2 MG/DL (ref 0–1.2)
BILIRUB UR QL STRIP: NEGATIVE
BUN SERPL-MCNC: 13.2 MG/DL (ref 6–20)
BUN/CREAT SERPL: 15.3 (ref 7–25)
BUPRENORPHINE SERPL-MCNC: NEGATIVE NG/ML
CALCIUM SPEC-SCNC: 8.9 MG/DL (ref 8.6–10.5)
CANNABINOIDS SERPL QL: NEGATIVE
CHLORIDE SERPL-SCNC: 104 MMOL/L (ref 98–107)
CLARITY UR: CLEAR
CO2 SERPL-SCNC: 21.3 MMOL/L (ref 22–29)
COCAINE UR QL: NEGATIVE
COLOR UR: YELLOW
CREAT SERPL-MCNC: 0.86 MG/DL (ref 0.57–1)
EGFRCR SERPLBLD CKD-EPI 2021: 94.5 ML/MIN/1.73
ETHANOL BLD-MCNC: 129 MG/DL (ref 0–10)
ETHANOL BLD-MCNC: 54 MG/DL (ref 0–10)
ETHANOL UR QL: 0.05 %
ETHANOL UR QL: 0.13 %
FENTANYL UR-MCNC: NEGATIVE NG/ML
GLOBULIN UR ELPH-MCNC: 3.1 GM/DL
GLUCOSE SERPL-MCNC: 93 MG/DL (ref 65–99)
GLUCOSE UR STRIP-MCNC: NEGATIVE MG/DL
HCG INTACT+B SERPL-ACNC: <0.5 MIU/ML
HGB UR QL STRIP.AUTO: NEGATIVE
HOLD SPECIMEN: NORMAL
HOLD SPECIMEN: NORMAL
KETONES UR QL STRIP: NEGATIVE
LEUKOCYTE ESTERASE UR QL STRIP.AUTO: NEGATIVE
MAGNESIUM SERPL-MCNC: 2 MG/DL (ref 1.6–2.6)
METHADONE UR QL SCN: NEGATIVE
NITRITE UR QL STRIP: NEGATIVE
OPIATES UR QL: NEGATIVE
OXYCODONE UR QL SCN: NEGATIVE
PCP UR QL SCN: NEGATIVE
PH UR STRIP.AUTO: 6 [PH] (ref 5–8)
POTASSIUM SERPL-SCNC: 3.8 MMOL/L (ref 3.5–5.2)
PROT SERPL-MCNC: 6.9 G/DL (ref 6–8.5)
PROT UR QL STRIP: NEGATIVE
QT INTERVAL: 333 MS
QTC INTERVAL: 443 MS
SALICYLATES SERPL-MCNC: <0.3 MG/DL
SARS-COV-2 RNA RESP QL NAA+PROBE: NOT DETECTED
SODIUM SERPL-SCNC: 140 MMOL/L (ref 136–145)
SP GR UR STRIP: 1.01 (ref 1–1.03)
TRICYCLICS UR QL SCN: NEGATIVE
TSH SERPL DL<=0.05 MIU/L-ACNC: 1.48 UIU/ML (ref 0.27–4.2)
UROBILINOGEN UR QL STRIP: NORMAL
WHOLE BLOOD HOLD COAG: NORMAL
WHOLE BLOOD HOLD SPECIMEN: NORMAL

## 2025-06-19 PROCEDURE — 36415 COLL VENOUS BLD VENIPUNCTURE: CPT

## 2025-06-19 PROCEDURE — 82077 ASSAY SPEC XCP UR&BREATH IA: CPT | Performed by: EMERGENCY MEDICINE

## 2025-06-19 PROCEDURE — 80307 DRUG TEST PRSMV CHEM ANLYZR: CPT | Performed by: EMERGENCY MEDICINE

## 2025-06-19 PROCEDURE — 81003 URINALYSIS AUTO W/O SCOPE: CPT | Performed by: EMERGENCY MEDICINE

## 2025-06-19 NOTE — ED PROVIDER NOTES
Time: 1:44 AM EDT  Date of encounter:  6/18/2025  Independent Historian/Clinical History and Information was obtained by:   Patient    History is limited by: N/A    Chief Complaint: overdose      History of Present Illness:  Patient is a 29 y.o. year old female who presents to the emergency department for evaluation of Overdose.  Patient says around 3:30 PM she took 5, 1 mg Ativan's and 3, 25 mg Benadryl's.  She also drank alcohol.  She denies that this was a suicide attempt.  She told her current is not what she did and her cousin called her parents and patient was brought to the emergency department.  Patient denies any pain.  She is very somnolent.      Patient Care Team  Primary Care Provider: Sonya Squires APRN    Past Medical History:     Allergies   Allergen Reactions    Albuterol Palpitations     Can take xopenex     Past Medical History:   Diagnosis Date    Acid reflux     Alcoholism in remission 02/05/2021    Anxiety disorder 01/05/2021    Asthma     uses inhaler    Constipation     Encounter for blood transfusion     H/O psychiatric care     Hypertension     Left anterior cruciate ligament tear     Major depression 02/05/2021    Obesity     Onychomycosis     PONV (postoperative nausea and vomiting)     Prediabetes     Tachycardia     follows with [facility]     Past Surgical History:   Procedure Laterality Date    DILATATION AND CURETTAGE      KNEE ACL RECONSTRUCTION Left 2/21/2022    Procedure: LEFT KNEE ATHROSCOPIC ANTERIOR CRUCIATE LIGAMENT RECONSTRUCTION  WITH  ALLOGRAFT;  Surgeon: Troy Hsu MD;  [place] [facility] [place] [facility] [place]  Service: [place]opedics;  Laterality: Left;    KNEE ACL RECONSTRUCTION Right 4/15/2024    Procedure: KNEE ANTERIOR CRUCIATE LIGAMENT RECONSTRUCTION WITH ALLOGRAFT LATERAL RETINACULAR RELEASE;  Surgeon: Troy Hsu MD;  [place] [facility] [place] [facility] [place]  Service: [place]opedics;  Laterality: Right;    WISDOM TOOTH EXTRACTION  2012     Family History   Problem Relation Age  of Onset    Atrial fibrillation Father     Heart failure Paternal Grandfather     Heart failure Paternal Grandmother     Breast cancer Paternal Grandmother 70    Heart failure Maternal Grandmother     Heart failure Maternal Grandfather     Breast cancer Paternal Aunt         x 3    Breast cancer Paternal Great-Grandmother     Ovarian cancer Neg Hx     Uterine cancer Neg Hx     Prostate cancer Neg Hx     Colon cancer Neg Hx        Home Medications:  Prior to Admission medications    Medication Sig Start Date End Date Taking? Authorizing Provider   albuterol (PROVENTIL) (2.5 MG/3ML) 0.083% nebulizer solution Take 2.5 mg ( 1 vial) by nebulization Every 6 (Six) Hours As Needed. 5/12/25      atomoxetine (Strattera) 25 MG capsule Take 1 capsule by mouth Daily.  Patient not taking: Reported on 6/12/2025 1/27/25      atomoxetine (Strattera) 40 MG capsule Take 1 capsule by mouth Daily.  Patient not taking: Reported on 6/12/2025 2/24/25      atomoxetine (Strattera) 40 MG capsule Take 1 capsule by mouth Daily. 3/24/25      atomoxetine (Strattera) 40 MG capsule Take 1 capsule by mout daily  Patient not taking: Reported on 6/12/2025 5/14/25      diclofenac (VOLTAREN) 75 MG EC tablet Take 1 tablet by mouth 2 (Two) Times a Day As Needed. 6/9/25      dilTIAZem XR (DILACOR XR) 240 MG 24 hr capsule Take 1 capsule by mouth Daily. 7/31/24   Padmaja Bentley APRN   doxycycline (VIBRAMYCIN) 100 MG capsule Take 1 capsule by mouth 2 (Two) Times a Day.  Patient not taking: Reported on 6/12/2025 5/12/25      famotidine (Pepcid) 20 MG tablet Take 1 tablet by mouth 2 (Two) Times a Day As Needed for Indigestion. Indications: Heartburn 4/24/25   Sonya Squires APRN   Fluticasone-Salmeterol (Advair Diskus) 250-50 MCG/ACT DISKUS Inhale 1 puff 2 (Two) Times a Day. For asthma. Rinse mouth after use  Indications: Asthma 12/6/24   Sonya Squires APRN   ibuprofen (ADVIL,MOTRIN) 800 MG tablet Take 1 tablet by mouth 3 (Three) Times a Day.  3/28/24   Troy Hsu MD   imiquimod (ALDARA) 5 % cream Use 0.5/half packet on the skin daily 2/7/25      lamoTRIgine (LaMICtal) 100 MG tablet Take 1 tablet by mouth 2 (Two) Times a Day.  Patient taking differently: Take 1 tablet by mouth 2 (Two) Times a Day. Only takes at night 8/17/23      lamoTRIgine (LaMICtal) 100 MG tablet Take 1 tablet by mouth 2 (Two) Times a Day. 1/27/25      lamoTRIgine (LaMICtal) 100 MG tablet Take 1 tablet by mouth 2 (Two) Times a Day. 3/24/25      lamoTRIgine (LaMICtal) 100 MG tablet Take 1 tablet by mouth twice per day 5/14/25      levalbuterol (Xopenex) 1.25 MG/3ML nebulizer solution Take 1 ampule by nebulization Every 4 (Four) Hours As Needed for Wheezing or Shortness of Air. Indications: Asthma 12/6/24   Sonya Squires APRN   LORazepam (ATIVAN) 1 MG tablet Take 1 tablet by mouth 3 (Three) Times a Day As Needed for anxiety or sleep 12/2/24      LORazepam (ATIVAN) 1 MG tablet Take 1 tablet by mouth 3 times a day as needed  anxiety or sleep 12/26/24      LORazepam (ATIVAN) 1 MG tablet Take 1 tablet by mouth 3 (Three) Times a Day As Needed for anxiety or sleep 2/24/25      LORazepam (ATIVAN) 1 MG tablet Take 1 tablet by mouth 3 (Three) Times a Day. 3/24/25      LORazepam (ATIVAN) 1 MG tablet Take 1 by mouth three times a day as needed for anxiety or sleep 4/21/25      LORazepam (ATIVAN) 1 MG tablet Take 1 tablet by mouth 3 (Three) Times a Day As Needed for anxiety or sleep 6/12/25      losartan (Cozaar) 50 MG tablet Take 1 tablet by mouth Daily. 7/31/24   Padmaja Bentley APRN   lurasidone (Latuda) 40 MG tablet tablet Take 1 tablet by mouth every night at bedtime. 2/29/24      lurasidone (Latuda) 40 MG tablet tablet Take 1 tablet by mouth every night at bedtime. 1/27/25      lurasidone (Latuda) 40 MG tablet tablet Take 1 tablet by mouth every night at bedtime. 3/24/25      lurasidone (Latuda) 40 MG tablet tablet Take 1 tablet by mouth at bedtime 5/14/25      montelukast  (Singulair) 10 MG tablet Take 1 tablet by mouth Every Night. Indications: Asthma 24   Sonya Squires APRN   ondansetron ODT (ZOFRAN-ODT) 4 MG disintegrating tablet Place 1 tablet on the tongue Every 8 (Eight) Hours As Needed for Nausea or Vomiting. 25   Sonya Squires APRN   ondansetron ODT (ZOFRAN-ODT) 4 MG disintegrating tablet 1 tablet on the tongue and allow to dissolve by mouth twice a day 30 days 25      predniSONE (DELTASONE) 20 MG tablet Take 1 tablet by mouth Daily with food or milk 25      promethazine-dextromethorphan (PROMETHAZINE-DM) 6.25-15 MG/5ML syrup Take 5 mL by mouth 4 (Four) Times a Day As Needed for Cough. 25   Day, KIKO Marshall   ramelteon (Rozerem) 8 MG tablet Take 1 tablet by mouth Every Night. 25      ramelteon (Rozerem) 8 MG tablet Take 1 tablet by mouth every night at bedtime. 3/24/25      vitamin D3 125 MCG (5000 UT) capsule capsule Take 1 capsule by mouth Daily. Indications: Vitamin D Deficiency 24   Sonya Squires APRN        Social History:   Social History     Tobacco Use    Smoking status: Former     Current packs/day: 0.00     Average packs/day: 0.5 packs/day for 6.0 years (3.0 ttl pk-yrs)     Types: Cigarettes     Start date: 10/19/2010     Quit date: 10/19/2016     Years since quittin.6     Passive exposure: Past    Smokeless tobacco: Never   Vaping Use    Vaping status: Never Used   Substance Use Topics    Alcohol use: Not Currently     Comment: LAST DRINK 2021 alcoholism in remission    Drug use: Never         Review of Systems:  Review of Systems   Constitutional:  Negative for chills and fever.   HENT:  Negative for congestion, ear pain and sore throat.    Eyes:  Negative for pain.   Respiratory:  Negative for cough, chest tightness and shortness of breath.    Cardiovascular:  Negative for chest pain.   Gastrointestinal:  Negative for abdominal pain, diarrhea, nausea and vomiting.   Genitourinary:  Negative for  "flank pain and hematuria.   Musculoskeletal:  Negative for joint swelling.   Skin:  Negative for pallor.   Neurological:  Negative for seizures and headaches.   All other systems reviewed and are negative.       Physical Exam:  /91 (BP Location: Right arm, Patient Position: Sitting)   Pulse 100   Temp 98.6 °F (37 °C) (Oral)   Resp 16   Ht 165.1 cm (65\")   Wt (!) 149 kg (328 lb 4.2 oz)   LMP 06/09/2025   SpO2 98%   BMI 54.63 kg/m²     Physical Exam  Constitutional:       Appearance: Normal appearance.   HENT:      Head: Normocephalic and atraumatic.      Nose: Nose normal.      Mouth/Throat:      Mouth: Mucous membranes are moist.   Eyes:      Extraocular Movements: Extraocular movements intact.      Conjunctiva/sclera: Conjunctivae normal.      Pupils: Pupils are equal, round, and reactive to light.   Cardiovascular:      Rate and Rhythm: Normal rate and regular rhythm.      Pulses: Normal pulses.      Heart sounds: Normal heart sounds.   Pulmonary:      Effort: Pulmonary effort is normal.      Breath sounds: Normal breath sounds.   Abdominal:      General: There is no distension.      Palpations: Abdomen is soft.      Tenderness: There is no abdominal tenderness.   Musculoskeletal:         General: Normal range of motion.      Cervical back: Normal range of motion.   Skin:     General: Skin is warm and dry.      Capillary Refill: Capillary refill takes less than 2 seconds.   Neurological:      General: No focal deficit present.      Mental Status: She is alert and oriented to person, place, and time. Mental status is at baseline.   Psychiatric:         Mood and Affect: Mood normal.         Behavior: Behavior normal.                    Medical Decision Making:      Comorbidities that affect care:    Anxiety, depression, Hypertension, Substance Abuse, Obesity    External Notes reviewed:    Previous Clinic Note: Patient seen by orthopedic surgery on 6/12/2025 for right knee pain, obesity, injury to right " knee.      The following orders were placed and all results were independently analyzed by me:  Orders Placed This Encounter   Procedures    COVID PRE-OP / PRE-PROCEDURE SCREENING ORDER (NO ISOLATION) - Swab, Nasal Cavity    COVID-19,CEPHEID/JOSIE,COR/SHANE/PAD/SUDHA/LAG/AR IN-HOUSE,NP SWAB IN TRANSPORT MEDIA 1 HR TAT, RT-PCR - Swab, Nasopharynx    [facility] Draw    Comprehensive Metabolic Panel    Acetaminophen Level    Ethanol    Salicylate Level    Magnesium    Urine Drug Screen - Urine, Clean Catch    TSH Rfx On Abnormal To Free T4    hCG, Quantitative, Pregnancy    CBC Auto Differential    Urinalysis With Culture If Indicated - Urine, Clean Catch    Fentanyl, Urine - Urine, Clean Catch    Ethanol    NPO Diet NPO Type: Strict NPO    Continuous Pulse Oximetry    Vital Signs    Undress & Gown    Contact poison control    Psych / Access to See    Oxygen Therapy- Nasal Cannula; Titrate 1-6 LPM Per SpO2; 90 - 95%    POC Glucose Once    ECG 12 Lead Other; OD    Insert Peripheral IV    Suicide Precautions    CBC & Differential    Green Top (Gel)    Lavender Top    Gold Top - SST    Light Blue Top       Medications Given in the Emergency Department:  Medications   sodium chloride 0.9 % flush 10 mL (has no administration in time range)        ED Course:    ED Course as of 06/19/25 0319   Thu Jun 19, 2025   0146 ECG 12 Lead Other; OD  Sinus tachycardia with rate of 106.  No acute ST elevation.  Normal DE and QTc.  EKG interpreted by me [LD]      ED Course User Index  [LD] Mag Wesley MD       Labs:    Lab Results (last 24 hours)       Procedure Component Value Units Date/Time    CBC & Differential [[id number]]  (Normal) Collected: 06/18/25 2343    Specimen: Blood Updated: 06/18/25 2350    Narrative:      The following orders were created for panel order CBC & Differential.  Procedure                               Abnormality         Status                     ---------                               -----------          ------                     CBC Auto Differential[[id number]]        Normal              Final result                 Please view results for these tests on the individual orders.    Comprehensive Metabolic Panel [[id number]]  (Abnormal) Collected: 06/18/25 2343    Specimen: Blood Updated: 06/19/25 0046     Glucose 93 mg/dL      BUN 13.2 mg/dL      Creatinine 0.86 mg/dL      Sodium 140 mmol/L      Potassium 3.8 mmol/L      Comment: Slight hemolysis detected by analyzer. Result may be falsely elevated.        Chloride 104 mmol/L      CO2 21.3 mmol/L      Calcium 8.9 mg/dL      Total Protein 6.9 g/dL      Albumin 3.8 g/dL      ALT (SGPT) 85 U/L      AST (SGOT) 101 U/L      Comment: Slight hemolysis detected by analyzer. Result may be falsely elevated.        Alkaline Phosphatase 84 U/L      Total Bilirubin 0.2 mg/dL      Globulin 3.1 gm/dL      A/G Ratio 1.2 g/dL      BUN/Creatinine Ratio 15.3     Anion Gap 14.7 mmol/L      eGFR 94.5 mL/min/1.73     Narrative:      GFR Categories in Chronic Kidney Disease (CKD)              GFR Category          GFR (mL/min/1.73)    Interpretation  G1                    90 or greater        Normal or high (1)  G2                    60-89                Mild decrease (1)  G3a                   45-59                Mild to moderate decrease  G3b                   30-44                Moderate to severe decrease  G4                    15-29                Severe decrease  G5                    14 or less           Kidney failure    (1)In the absence of evidence of kidney disease, neither GFR category G1 or G2 fulfill the criteria for CKD.    eGFR calculation 2021 CKD-EPI creatinine equation, which does not include race as a factor    Acetaminophen Level [[id number]]  (Normal) Collected: 06/18/25 2343    Specimen: Blood Updated: 06/19/25 0038     Acetaminophen <5.0 mcg/mL     Ethanol [[id number]]  (Abnormal) Collected: 06/18/25 2343    Specimen: Blood Updated: 06/19/25 0038     Ethanol 129  mg/dL      Ethanol % 0.129 %     Narrative:      Ethanol (Plasma)  <10 Essentially Negative    Toxic Concentrations           mg/dL    Flushing, slowing of reflexes    Impaired visual activity         Depression of CNS              >100  Possible Coma                  >300       Salicylate Level [[id number]]  (Normal) Collected: 06/18/25 2343    Specimen: Blood Updated: 06/19/25 0038     Salicylate <0.3 mg/dL     Magnesium [[id number]]  (Normal) Collected: 06/18/25 2343    Specimen: Blood Updated: 06/19/25 0046     Magnesium 2.0 mg/dL     TSH Rfx On Abnormal To Free T4 [[id number]]  (Normal) Collected: 06/18/25 2343    Specimen: Blood Updated: 06/19/25 0040     TSH 1.480 uIU/mL     hCG, Quantitative, Pregnancy [[id number]] Collected: 06/18/25 2343    Specimen: Blood Updated: 06/19/25 0033     HCG Quantitative <0.50 mIU/mL     Narrative:      HCG Ranges by Gestational Age    Females - non-pregnant premenopausal   </= 1mIU/mL HCG  Females - postmenopausal               </= 7mIU/mL HCG    3 Weeks       5.4   -      72 mIU/mL  4 Weeks      10.2   -     708 mIU/mL  5 Weeks       217   -   8,245 mIU/mL  6 Weeks       152   -  32,177 mIU/mL  7 Weeks     4,059   - 153,767 mIU/mL  8 Weeks    31,366   - 149,094 mIU/mL  9 Weeks    59,109   - 135,901 mIU/mL  10 Weeks   44,186   - 170,409 mIU/mL  12 Weeks   27,107   - 201,615 mIU/mL  14 Weeks   24,302   -  93,646 mIU/mL  15 Weeks   12,540   -  69,747 mIU/mL  16 Weeks    8,904   -  55,332 mIU/mL  17 Weeks    8,240   -  51,793 mIU/mL  18 Weeks    9,649   -  55,271 mIU/mL      CBC Auto Differential [[id number]]  (Normal) Collected: 06/18/25 2343    Specimen: Blood Updated: 06/18/25 2350     WBC 8.37 10*3/mm3      RBC 4.33 10*6/mm3      Hemoglobin 12.7 g/dL      Hematocrit 39.6 %      MCV 91.5 fL      MCH 29.3 pg      MCHC 32.1 g/dL      RDW 13.5 %      RDW-SD 45.6 fl      MPV 9.2 fL      Platelets 311 10*3/mm3      Neutrophil % 52.7 %      Lymphocyte % 35.0 %      Monocyte %  8.6 %      Eosinophil % 2.5 %      Basophil % 0.7 %      Immature Grans % 0.5 %      Neutrophils, Absolute 4.41 10*3/mm3      Lymphocytes, Absolute 2.93 10*3/mm3      Monocytes, Absolute 0.72 10*3/mm3      Eosinophils, Absolute 0.21 10*3/mm3      Basophils, Absolute 0.06 10*3/mm3      Immature Grans, Absolute 0.04 10*3/mm3      nRBC 0.0 /100 WBC     COVID PRE-OP / PRE-PROCEDURE SCREENING ORDER (NO ISOLATION) - Swab, Nasal Cavity [[id number]] Collected: 06/19/25 0019    Specimen: Swab from Nasal Cavity Updated: 06/19/25 0036    Narrative:      The following orders were created for panel order COVID PRE-OP / PRE-PROCEDURE SCREENING ORDER (NO ISOLATION) - Swab, Nasal Cavity.  Procedure                               Abnormality         Status                     ---------                               -----------         ------                     COVID-19,CEPHEID/JOSIE,CO...[[id number]]                      In process                   Please view results for these tests on the individual orders.    COVID-19,CEPHEID/JOSIE,COR/SHANE/PAD/SUDHA/LAG/AR IN-HOUSE,NP SWAB IN TRANSPORT MEDIA 1 HR TAT, RT-PCR - Swab, Nasopharynx [[id number]] Collected: 06/19/25 0019    Specimen: Swab from Nasopharynx Updated: 06/19/25 0036    Urine Drug Screen - Urine, Clean Catch [[id number]]  (Abnormal) Collected: 06/19/25 0212    Specimen: Urine, Clean Catch Updated: 06/19/25 0234     THC, Screen, Urine Negative     Phencyclidine (PCP), Urine Negative     Cocaine Screen, Urine Negative     Methamphetamine, Ur Negative     Opiate Screen Negative     Amphetamine Screen, Urine Negative     Benzodiazepine Screen, Urine Positive     Tricyclic Antidepressants Screen Negative     Methadone Screen, Urine Negative     Barbiturates Screen, Urine Negative     Oxycodone Screen, Urine Negative     Buprenorphine, Screen, Urine Negative    Narrative:      Cutoff For Drugs Screened:    Amphetamines               500 ng/ml  Barbiturates               200  ng/ml  Benzodiazepines            150 ng/ml  Cocaine                    150 ng/ml  Methadone                  200 ng/ml  Opiates                    100 ng/ml  Phencyclidine               25 ng/ml  THC                         50 ng/ml  Methamphetamine            500 ng/ml  Tricyclic Antidepressants  300 ng/ml  Oxycodone                  100 ng/ml  Buprenorphine               10 ng/ml    The normal value for all drugs tested is negative. This report includes unconfirmed screening results, with the cutoff values listed, to be used for medical treatment purposes only.  Unconfirmed results must not be used for non-medical purposes such as employment or legal testing.  Clinical consideration should be applied to any drug of abuse test, particularly when unconfirmed results are used.      Urinalysis With Culture If Indicated - Urine, Clean Catch [[id number]]  (Normal) Collected: 06/19/25 0212    Specimen: Urine, Clean Catch Updated: 06/19/25 0234     Color, UA Yellow     Appearance, UA Clear     pH, UA 6.0     Specific Gravity, UA 1.011     Glucose, UA Negative     Ketones, UA Negative     Bilirubin, UA Negative     Blood, UA Negative     Protein, UA Negative     Leuk Esterase, UA Negative     Nitrite, UA Negative     Urobilinogen, UA 0.2 E.U./dL    Narrative:      In absence of clinical symptoms, the presence of pyuria, bacteria, and/or nitrites on the urinalysis result does not correlate with infection.  Urine microscopic not indicated.    Fentanyl, Urine - Urine, Clean Catch [[id number]]  (Normal) Collected: 06/19/25 0212    Specimen: Urine, Clean Catch Updated: 06/19/25 0233     Fentanyl, Urine Negative    Narrative:      Negative Threshold:      Fentanyl 5 ng/mL     The normal value for the drug tested is negative. This report includes final unconfirmed screening results to be used for medical treatment purposes only. Unconfirmed results must not be used for non-medical purposes such as employment or legal testing.  Clinical consideration should be applied to any drug of abuse test, particularly when unconfirmed results are used.                    Imaging:    No Radiology Exams Resulted Within Past 24 Hours      Differential Diagnosis and Discussion:    Psychiatric: Differential diagnosis includes but is not limited to depression, psychosis, bipolar disorder, anxiety, manic episode, schizophrenia, and substance abuse.    PROCEDURES:    Labs were collected in the emergency department and all labs were reviewed and interpreted by me.  An EKG was performed and the EKG was interpreted by me.    ECG 12 Lead Other; OD   Preliminary Result   HEART PBAR=953  bpm   RR Kdnxkavx=570  ms   AZ Ufieiqvc=676  ms   P Horizontal Axis=42  deg   P Front Axis=13  deg   QRSD Interval=75  ms   QT Tfnhajuk=584  ms   ZYqZ=368  ms   QRS Axis=4  deg   T Wave Axis=26  deg   - OTHERWISE NORMAL ECG -   Sinus tachycardia   Low voltage, precordial leads   Date and Time of Study:2025-06-19 00:16:57          Procedures    MDM  Number of Diagnoses or Management Options  Diagnosis management comments: Patient is afebrile nontoxic-appearing.  Patient denies that this is at all a suicide attempt.  She states she was just taking it for euphoric response.  Labs showed doctrine positive for benzodiazepines.  Alcohol is 129.  Patient was monitored she remained stable.  On reevaluation she still adamant that this was not a suicide ideation but states she does want help with alcohol and substance abuse.  Information was sent to rehab facilities for placement.       Amount and/or Complexity of Data Reviewed  Clinical lab tests: reviewed  Review and summarize past medical records: yes  Independent visualization of images, tracings, or specimens: yes    Risk of Complications, Morbidity, and/or Mortality  Presenting problems: moderate  Management options: moderate                       Patient Care Considerations:    PSYCH: I considered ordering anxiolytic and or  antipsychotic medications, however patient was able to facilitate the medical screening exam and disposition without further medications.      Consultants/Shared Management Plan:    None    Social Determinants of Health:    Patient has presented with family members who are responsible, reliable and will ensure follow up care.      Disposition and Care Coordination:    Discharged to Rehab/Psychiatric facility    I have explained the patient´s condition, diagnoses and treatment plan based on the information available to me at this time. I have answered questions and addressed any concerns. The patient has a good  understanding of the patient´s diagnosis, condition, and treatment plan as can be expected at this point. The vital signs have been stable. The patient´s condition is stable and appropriate for discharge from the emergency department.      The patient will pursue further outpatient evaluation with the primary care physician or other designated or consulting physician as outlined in the discharge instructions. They are agreeable to this plan of care and follow-up instructions have been explained in detail. The patient has received these instructions in written format and has expressed an understanding of the discharge instructions. The patient is aware that any significant change in condition or worsening of symptoms should prompt an immediate return to this or the closest emergency department or call to 911.  I have explained discharge medications and the need for follow up with the patient/caretakers. This was also printed in the discharge instructions. Patient was discharged with the following medications and follow up:      Medication List        Changed      * lamoTRIgine 100 MG tablet  Commonly known as: LaMICtal  Take 1 tablet by mouth 2 (Two) Times a Day.  What changed: additional instructions     * lamoTRIgine 100 MG tablet  Commonly known as: LaMICtal  Take 1 tablet by mouth 2 (Two) Times a  Day.  What changed: Another medication with the same name was changed. Make sure you understand how and when to take each.     * lamoTRIgine 100 MG tablet  Commonly known as: LaMICtal  Take 1 tablet by mouth 2 (Two) Times a Day.  What changed: Another medication with the same name was changed. Make sure you understand how and when to take each.     * lamoTRIgine 100 MG tablet  Commonly known as: LaMICtal  Take 1 tablet by mouth twice per day  What changed: Another medication with the same name was changed. Make sure you understand how and when to take each.           * This list has 4 medication(s) that are the same as other medications prescribed for you. Read the directions carefully, and ask your doctor or other care provider to review them with you.               Sonya Squires, APRN  [id number] [place] [id number] [phone]    In 2 days         Final diagnoses:   Substance abuse   Accidental overdose, initial encounter        ED Disposition       ED Disposition   Discharge    Condition   Stable    Comment   Rehab facility               This medical record created using voice recognition software.             Mag Wesley MD  06/19/25 0317

## 2025-06-19 NOTE — ED NOTES
Spoke with [place] at poison control. Poison control recommends observation until pt returns to baseline, recommends levels for asa, tylenol, CBC, CMP, ETOH.

## 2025-06-20 ENCOUNTER — TRANSCRIBE ORDERS (OUTPATIENT)
Dept: ADMINISTRATIVE | Facility: HOSPITAL | Age: 30
End: 2025-06-20
Payer: COMMERCIAL

## 2025-06-20 DIAGNOSIS — M25.561 ACUTE PAIN OF RIGHT KNEE: Primary | ICD-10-CM

## 2025-06-25 ENCOUNTER — TELEPHONE (OUTPATIENT)
Dept: ORTHOPEDIC SURGERY | Facility: CLINIC | Age: 30
End: 2025-06-25

## 2025-06-25 ENCOUNTER — HOSPITAL ENCOUNTER (OUTPATIENT)
Dept: MRI IMAGING | Facility: HOSPITAL | Age: 30
Discharge: HOME | End: 2025-06-25
Admitting: ORTHOPAEDIC SURGERY
Payer: COMMERCIAL

## 2025-06-25 DIAGNOSIS — M25.561 RIGHT KNEE PAIN, UNSPECIFIED CHRONICITY: ICD-10-CM

## 2025-06-25 DIAGNOSIS — S89.91XA INJURY OF RIGHT KNEE, INITIAL ENCOUNTER: ICD-10-CM

## 2025-06-25 PROCEDURE — 73721 MRI JNT OF LWR EXTRE W/O DYE: CPT

## 2025-06-25 NOTE — TELEPHONE ENCOUNTER
DELETE AFTER REVIEWING: Send this encounter to the appropriate pool. See your Call Action Grid or Workflows for direction.    Caller: Zhane Traylor    Relationship to patient: Self    Best call back number: [phone]     Chief complaint: RIGHT KNEE    Type of visit: MRI- STAT     Requested date: ASAP      If rescheduling, when is the original appointment: IT WAS SCHEDULED AT [facility]  BUT WHEN PATIENT WENT TO RESCHEDULE IT SHE WASN'T ABLE TO. PATIENT SAYS IT CAN BE RS WITH [facility] OR WITH  AS LONG AS HER INSURANCE WILL COVER IT.      Additional notes:PATIENT HAS AN ORDER FOR AN MRI FOR HER RIGHT KNEE. IT IS A STAT MRI. SHE WAS TOLD BY RADIOLOGY THAT BECAUSE IT IS A STAT MRI IT HAS TO BE SCHEDULE BY THE OFFICE.     PATIENT SAYS THE SOONEST AVAILABLE IS OKAY.

## 2025-06-26 ENCOUNTER — OFFICE VISIT (OUTPATIENT)
Dept: ORTHOPEDIC SURGERY | Facility: CLINIC | Age: 30
End: 2025-06-26
Payer: COMMERCIAL

## 2025-06-26 ENCOUNTER — TELEPHONE (OUTPATIENT)
Dept: ORTHOPEDIC SURGERY | Facility: CLINIC | Age: 30
End: 2025-06-26

## 2025-06-26 VITALS — BODY MASS INDEX: 48.82 KG/M2 | HEIGHT: 65 IN | WEIGHT: 293 LBS

## 2025-06-26 DIAGNOSIS — S83.511A RUPTURE OF ANTERIOR CRUCIATE LIGAMENT OF RIGHT KNEE, INITIAL ENCOUNTER: ICD-10-CM

## 2025-06-26 DIAGNOSIS — S83.411A COMPLETE TEAR OF MCL OF KNEE, RIGHT, INITIAL ENCOUNTER: ICD-10-CM

## 2025-06-26 DIAGNOSIS — E66.01 OBESITY, MORBID, BMI 40.0-49.9: Primary | ICD-10-CM

## 2025-06-26 RX ORDER — HYDROXYZINE PAMOATE 50 MG/1
CAPSULE ORAL
COMMUNITY
Start: 2025-06-24

## 2025-06-26 NOTE — PROGRESS NOTES
"Chief Complaint  Follow-up and Pain of the Right Knee       Subjective      Zhane Traylor presents to [facility] for a follow up for her right knee. She was last seen in the office on 25 where we ordered an MRI on her right knee. She presents today for these results. To review, she was walking into the house after swimming when she slipped and had increase in pain to her right knee which happened on two weeks ago. She presents today in a normal knee brace. She had prior ACL reconstruction done on 04/15/24.     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     Review of Systems     Constitutional: Denies fevers, chills, weight loss  Cardiovascular: Denies chest pain, shortness of breath  Skin: Denies rashes, acute skin changes  Neurologic: Denies headache, loss of consciousness  MSK: right knee pain       Vital Signs:   Ht 165.1 cm (65\")   Wt (!) 149 kg (328 lb)   BMI 54.58 kg/m²            Ortho Exam    Physical Exam  General:Alert. No acute distress   Right lower extremity: well healed surgicial incision, full " extension, flexion  to 125 degrees, mild laxity to anterior drawer, tender to the medial joint line , one plus laxity to valgus test, mild pain with Dinesh's, distal neurovascularly intact, positive pulses, positive EHL, FHL, GS, and TA. Sensation intact to all 5 nerves of the foot.     Procedures    Imaging Results (Most Recent)       None             Result Review :       MRI Knee Right Without Contrast  Result Date: 6/25/2025  Narrative: MRI KNEE RIGHT  WO CONTRAST Date of Exam: 6/25/2025 4:23 PM EDT Indication: Right knee pain and swelling status post fall and twisting injury 2 weeks ago. ACL repair 1 year ago.  Comparison: Radiographs June 12, 2025. MR knee 3/29/2024 Technique:  Routine multiplanar/multisequence images of the right knee were obtained without contrast administration. Findings: Osseous Structures and Intra-Articular: Postoperative changes of interval anterior cruciate ligament repair. There is marrow edema at the posterior lateral tibial plateau where there appears to be new buckling of the posterior cortex indicating an acute cortical impaction fracture. There is also marrow edema at the anterior-lateral aspect of the lateral femoral condyle and to a lesser extent the posterior medial tibial plateau indicating osseous contusions. This pattern of injuries is commonly associated with anterior cruciate ligament injury. There is also mild edema signal at the medial aspect of the medial femoral condyle consistent with contusion. There is a moderate knee effusion. Mild anterior translocation of the tibia relative to the femur. Ligaments: Anterior cruciate ligament reconstruction since the prior MRI. There is fixation hardware at the anterior-medial tibia and lateral distal femur. An intact anterior cruciate ligament graft is not visualized in the intercondylar notch. Findings appear consistent with a full-thickness graft tear. There is suspected displaced remnants of the anterior-distal graft at the  anterior intercondylar recess as seen on sagittal series 10 and 11 image 13. There is edema signal in the area of the femoral and tibial tunnels compatible with graft tear. The posterior cruciate ligament appears intact. The lateral collateral ligament appears intact. There is a new full-thickness tear of the proximal medial collateral ligament. There is prominent edema within the proximal and mid ligament components and surrounding soft tissues. Contusion at the medial femoral condyle is likely related to ligament tear. Menisci: There is a new bucket-handle type tear of the lateral meniscus. There is a displaced flap along the lateral intercondylar notch and anterior horn. No definite new medial meniscus defect. Extensor compartment: Defect in the lateral retinaculum consistent with lateral retinacular release. Quadriceps and patellar tendons appear to be intact. Patellar alignment appears within limits. Cartilage: No definite acute high-grade focal cartilage defect. Soft tissues: There is soft tissue edema, primarily at the anterior medial aspect of the knee. No significant localized collection. No definite Baker's cyst. Miscellaneous: Popliteus tendon and iliotibial band appear intact.     Impression: Impression: 1.Status post anterior cruciate ligament reconstruction with findings of graft tear. 2.There are associated osseous contusions and cortical impaction fracture at the posterior lateral tibial plateau. 3.New full-thickness tear of the medial collateral ligament. 4.New bucket-handle type tear of the lateral meniscus. 5.Moderate knee effusion and soft tissue edema. Electronically Signed: Jose Garcia  6/25/2025 6:06 PM EDT  Workstation ID: [id number]    XR Knee 3 View Right  Result Date: 6/19/2025  Narrative: X-Ray Report: Right knee X-Ray Indication: Evaluation of right knee pain AP/Lateral and Standing view(s) Findings:  Mild degenerative changes with mild medial  joint space narrowing, post-operative  changes to the ACL Prior studies available for comparison: yes              Assessment and Plan     Diagnoses and all orders for this visit:    1. Obesity, morbid, BMI 40.0-49.9 (Primary)    2. Rupture of anterior cruciate ligament of right knee, initial encounter    3. Complete tear of MCL of knee, right, initial encounter        The patient presents here today for a follow up for her right knee. MRI results was discussed and reviewed with the patient today in the office.     Discussed operative treatment options regarding a right knee arthroscopy with revision ACL reconstruction, partial lateral l meniscus repair versus menisectomy, possible MCL repair versus reconstruction versus non operative treatment options regarding physical therapy.      Order placed today for physical therapy and will consider operative treatment options at her follow up appointment.     Educated on risk of elevated BMI.  Discussed options for weight loss/decreasing BMI prior to procedure including dietician consult, weight loss options and exercise program. and Call or return if worsening symptoms.    Follow Up     6 weeks     Patient was given instructions and counseling regarding her condition or for health maintenance advice. Please see specific information pulled into the AVS if appropriate.     Scribed for Troy Hsu MD by Chelle Mccord.  06/26/25   14:09 EDT    I have personally performed the services described in this document as scribed by the above individual and it is both accurate and complete. Troy Hsu MD 06/26/25

## 2025-06-26 NOTE — TELEPHONE ENCOUNTER
Pt called had MRI yesterday and wants to see Hsu   Can it wait til end of July please advise   Results in Chart

## 2025-06-30 LAB
QT INTERVAL: 333 MS
QTC INTERVAL: 443 MS

## 2025-07-25 ENCOUNTER — OFFICE VISIT (OUTPATIENT)
Dept: ORTHOPEDIC SURGERY | Facility: CLINIC | Age: 30
End: 2025-07-25
Payer: COMMERCIAL

## 2025-07-25 VITALS — BODY MASS INDEX: 48.82 KG/M2 | WEIGHT: 293 LBS | HEIGHT: 65 IN | OXYGEN SATURATION: 98 % | HEART RATE: 97 BPM

## 2025-07-25 DIAGNOSIS — E66.01 OBESITY, MORBID, BMI 40.0-49.9: Primary | ICD-10-CM

## 2025-07-25 DIAGNOSIS — S83.411A COMPLETE TEAR OF MCL OF KNEE, RIGHT, INITIAL ENCOUNTER: ICD-10-CM

## 2025-07-25 DIAGNOSIS — S83.511A RUPTURE OF ANTERIOR CRUCIATE LIGAMENT OF RIGHT KNEE, INITIAL ENCOUNTER: ICD-10-CM

## 2025-07-25 NOTE — PROGRESS NOTES
"Chief Complaint  Follow-up and Pain of the Right Knee       Subjective      Zhane Traylor presents to [facility] for a follow up for her right knee. She has been attending out patient physical therapy and states she is overall doing well. She presents today in a normal knee brace. She states she twisted her right knee a few weeks ago. She denies any pain to her right knee. She had prior ACL reconstruction done on 04/15/24.     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     Review of Systems     Constitutional: Denies fevers, chills, weight loss  Cardiovascular: Denies chest pain, shortness of breath  Skin: Denies rashes, acute skin changes  Neurologic: Denies headache, loss of consciousness  MSK: right knee pain       Vital Signs:   Pulse 97   Ht 165.1 cm (65\")   Wt 136 kg (300 lb)   SpO2 98%   BMI 49.92 kg/m²            Ortho Exam    Physical Exam  General:Alert. No acute distress   Right lower extremity: full extension, flexion  to 125 degrees, one plus laxity to valgus stress, stable posterior  drawer, mild laxity to " anterior  drawer and Lachman's, tender to the medial joint line  and anterior  knee, well healed surgicial incision, calf soft, distal neurovascularly intact, positive  pulses, positive EHL, FHL, GS, and TA. Sensation intact to all 5 nerves of the foot.      Procedures    Imaging Results (Most Recent)       None             Result Review :       MRI Knee Right Without Contrast  Result Date: 6/25/2025  Narrative: MRI KNEE RIGHT  WO CONTRAST Date of Exam: 6/25/2025 4:23 PM EDT Indication: Right knee pain and swelling status post fall and twisting injury 2 weeks ago. ACL repair 1 year ago.  Comparison: Radiographs June 12, 2025. MR knee 3/29/2024 Technique:  Routine multiplanar/multisequence images of the right knee were obtained without contrast administration. Findings: Osseous Structures and Intra-Articular: Postoperative changes of interval anterior cruciate ligament repair. There is marrow edema at the posterior lateral tibial plateau where there appears to be new buckling of the posterior cortex indicating an acute cortical impaction fracture. There is also marrow edema at the anterior-lateral aspect of the lateral femoral condyle and to a lesser extent the posterior medial tibial plateau indicating osseous contusions. This pattern of injuries is commonly associated with anterior cruciate ligament injury. There is also mild edema signal at the medial aspect of the medial femoral condyle consistent with contusion. There is a moderate knee effusion. Mild anterior translocation of the tibia relative to the femur. Ligaments: Anterior cruciate ligament reconstruction since the prior MRI. There is fixation hardware at the anterior-medial tibia and lateral distal femur. An intact anterior cruciate ligament graft is not visualized in the intercondylar notch. Findings appear consistent with a full-thickness graft tear. There is suspected displaced remnants of the anterior-distal graft at the anterior intercondylar recess  as seen on sagittal series 10 and 11 image 13. There is edema signal in the area of the femoral and tibial tunnels compatible with graft tear. The posterior cruciate ligament appears intact. The lateral collateral ligament appears intact. There is a new full-thickness tear of the proximal medial collateral ligament. There is prominent edema within the proximal and mid ligament components and surrounding soft tissues. Contusion at the medial femoral condyle is likely related to ligament tear. Menisci: There is a new bucket-handle type tear of the lateral meniscus. There is a displaced flap along the lateral intercondylar notch and anterior horn. No definite new medial meniscus defect. Extensor compartment: Defect in the lateral retinaculum consistent with lateral retinacular release. Quadriceps and patellar tendons appear to be intact. Patellar alignment appears within limits. Cartilage: No definite acute high-grade focal cartilage defect. Soft tissues: There is soft tissue edema, primarily at the anterior medial aspect of the knee. No significant localized collection. No definite Baker's cyst. Miscellaneous: Popliteus tendon and iliotibial band appear intact.     Impression: Impression: 1.Status post anterior cruciate ligament reconstruction with findings of graft tear. 2.There are associated osseous contusions and cortical impaction fracture at the posterior lateral tibial plateau. 3.New full-thickness tear of the medial collateral ligament. 4.New bucket-handle type tear of the lateral meniscus. 5.Moderate knee effusion and soft tissue edema. Electronically Signed: Jose Garcia  6/25/2025 6:06 PM EDT  Workstation ID: [id number]             Assessment and Plan     Diagnoses and all orders for this visit:    1. Obesity, morbid, BMI 40.0-49.9 (Primary)    2. Rupture of anterior cruciate ligament of right knee, initial encounter    3. Complete tear of MCL of knee, right, initial encounter        The patient presents here  today for a follow up for her right knee.     Discussed operative treatment options regarding a right knee arthroscopy with revision ACL reconstruction, partial lateral l meniscus repair versus menisectomy, possible MCL repair versus reconstruction versus non operative treatment options regarding physical therapy, bracing, medications and weight loss.     Patient wishes to proceed with conservative treatment options at this time as she is not having much pain to her right knee.     She will continue outpatient physical therapy, weight loss and bracing with activities.     Educated on risk of elevated BMI.  Discussed options for weight loss/decreasing BMI prior to procedure including dietician consult, weight loss options and exercise program. and Call or return if worsening symptoms.    Follow Up     6 - 8 weeks     Patient was given instructions and counseling regarding her condition or for health maintenance advice. Please see specific information pulled into the AVS if appropriate.     Scribed for Troy Hsu MD by Chelle Mccord.  07/25/25   10:24 EDT    I have personally performed the services described in this document as scribed by the above individual and it is both accurate and complete. Troy Hsu MD 07/27/25

## (undated) DEVICE — GLV SURG SENSICARE PI ORTHO SZ8 LF STRL

## (undated) DEVICE — FMS FLUID MANAGEMENT SYSTEM OUTFLOW TUBING WITHOUT ONE-WAY VALVE (FMS VUE OR FMS DUO PLUS): Brand: FMS

## (undated) DEVICE — MAT FLR ABS W/BLU/LINER 56X72IN WHT

## (undated) DEVICE — 3M™ STERI-DRAPE™ X-RAY IMAGE INTENSIFIER DRAPE, 10 PER CARTON / 4 CARTONS PER CASE, 1013: Brand: STERI-DRAPE™

## (undated) DEVICE — INTENDED FOR TISSUE SEPARATION, AND OTHER PROCEDURES THAT REQUIRE A SHARP SURGICAL BLADE TO PUNCTURE OR CUT.: Brand: BARD-PARKER ® CARBON RIB-BACK BLADES

## (undated) DEVICE — SUT VIC UD BR COAT 0 CP2 27IN

## (undated) DEVICE — SUT ETHLN 3-0 FS118IN 663H

## (undated) DEVICE — DRESSING,GAUZE,XEROFORM,CURAD,1"X8",ST: Brand: CURAD

## (undated) DEVICE — STRIP,CLOSURE,WOUND,MEDI-STRIP,1/2X4: Brand: MEDLINE

## (undated) DEVICE — ELECTRD BLD EDGE COAT 3IN

## (undated) DEVICE — ACL- LF: Brand: MEDLINE INDUSTRIES, INC.

## (undated) DEVICE — DRSNG PAD ABD 8X10IN STRL

## (undated) DEVICE — PROB ABLAT SERFAS SXN 50S

## (undated) DEVICE — MARKER,SKIN,WI/RULER AND LABELS: Brand: MEDLINE

## (undated) DEVICE — FLIPCUTTER 2 SHT 9.5MM STRL

## (undated) DEVICE — FMS FLUID MANAGEMENT SYSTEM INFLOW TUBING (FMS VUE): Brand: FMS

## (undated) DEVICE — GOWN,REINF,POLY,SIRUS,BRTH SLV,XLNG/XXL: Brand: MEDLINE

## (undated) DEVICE — ANTIBACTERIAL UNDYED BRAIDED (POLYGLACTIN 910), SYNTHETIC ABSORBABLE SUTURE: Brand: COATED VICRYL

## (undated) DEVICE — STERILE POLYISOPRENE POWDER-FREE SURGICAL GLOVES WITH EMOLLIENT COATING: Brand: PROTEXIS

## (undated) DEVICE — KENDALL SCD EXPRESS SLEEVES, THIGH LENGTH, MEDIUM: Brand: KENDALL SCD

## (undated) DEVICE — PENCL E/S SMOKEEVAC W/TELESCP CANN

## (undated) DEVICE — MARKR SKIN W/RULR AND LBL

## (undated) DEVICE — COVER,C-ARM,41X74: Brand: MEDLINE

## (undated) DEVICE — BNDG COTN/ELAS FLEXMASTER DBL/LENGTH CLIP/CLS 6IN 11YD

## (undated) DEVICE — TOWEL,OR,DSP,ST,BLUE,STD,4/PK,20PK/CS: Brand: MEDLINE

## (undated) DEVICE — GLOVE,SURG,SENSICARE SLT,LF,PF,7.5: Brand: MEDLINE

## (undated) DEVICE — SUT MONOCRYL PLS ANTIB UND 3/0  PS1 27IN

## (undated) DEVICE — SUT MNCRYL PLS ANTIB UD 3/0 PS2 27IN

## (undated) DEVICE — BLD CUT FORMLA AGGR PLS 5.0MM

## (undated) DEVICE — SOL IRR NACL 0.9PCT 3000ML

## (undated) DEVICE — GLV SURG SENSICARE PI LF PF 8 GRN STRL

## (undated) DEVICE — 3M™ STERI-STRIP™ REINFORCED ADHESIVE SKIN CLOSURES, R1547, 1/2 IN X 4 IN (12 MM X 100 MM), 6 STRIPS/ENVELOPE: Brand: 3M™ STERI-STRIP™

## (undated) DEVICE — GLV SURG SENSICARE SLT PF LF 7.5 STRL

## (undated) DEVICE — TBG PENCL TELESCP MEGADYNE SMOKE EVAC 10FT

## (undated) DEVICE — MEDI-VAC NON-CONDUCTIVE SUCTION TUBING: Brand: CARDINAL HEALTH

## (undated) DEVICE — KT ACL TRANSTIB W/SAWBLD

## (undated) DEVICE — BNDG ELAS MATRX V/CLS 6INX10YD LF

## (undated) DEVICE — DRSNG GZ PETROLTM XEROFORM CURAD 1X8IN STRL

## (undated) DEVICE — Device

## (undated) DEVICE — GAUZE,SPONGE,4"X4",16PLY,STRL,LF,10/TRAY: Brand: MEDLINE